# Patient Record
Sex: FEMALE | Race: WHITE | NOT HISPANIC OR LATINO | Employment: OTHER | URBAN - METROPOLITAN AREA
[De-identification: names, ages, dates, MRNs, and addresses within clinical notes are randomized per-mention and may not be internally consistent; named-entity substitution may affect disease eponyms.]

---

## 2017-02-01 RX ORDER — NORETHINDRONE ACETATE AND ETHINYL ESTRADIOL, ETHINYL ESTRADIOL AND FERROUS FUMARATE 1MG-10(24)
KIT ORAL
Qty: 28 TABLET | Refills: 1 | Status: SHIPPED | OUTPATIENT
Start: 2017-02-01 | End: 2018-05-25

## 2018-03-27 ENCOUNTER — TRANSCRIBE ORDERS (OUTPATIENT)
Dept: ADMINISTRATIVE | Facility: HOSPITAL | Age: 53
End: 2018-03-27

## 2018-03-27 DIAGNOSIS — R94.31 ABNORMAL EKG: Primary | ICD-10-CM

## 2018-04-02 ENCOUNTER — HOSPITAL ENCOUNTER (OUTPATIENT)
Dept: CARDIOLOGY | Facility: HOSPITAL | Age: 53
End: 2018-04-02

## 2018-05-01 ENCOUNTER — APPOINTMENT (OUTPATIENT)
Dept: WOMENS IMAGING | Facility: HOSPITAL | Age: 53
End: 2018-05-01

## 2018-05-01 PROCEDURE — 77063 BREAST TOMOSYNTHESIS BI: CPT | Performed by: RADIOLOGY

## 2018-05-01 PROCEDURE — 77067 SCR MAMMO BI INCL CAD: CPT | Performed by: RADIOLOGY

## 2018-05-01 PROCEDURE — 77080 DXA BONE DENSITY AXIAL: CPT | Performed by: RADIOLOGY

## 2018-05-21 ENCOUNTER — APPOINTMENT (OUTPATIENT)
Dept: WOMENS IMAGING | Facility: HOSPITAL | Age: 53
End: 2018-05-21

## 2018-05-21 PROCEDURE — 76641 ULTRASOUND BREAST COMPLETE: CPT | Performed by: RADIOLOGY

## 2018-05-21 PROCEDURE — MDREVIEWSP: Performed by: RADIOLOGY

## 2018-05-25 ENCOUNTER — OFFICE VISIT (OUTPATIENT)
Dept: CARDIOLOGY | Facility: CLINIC | Age: 53
End: 2018-05-25

## 2018-05-25 VITALS
WEIGHT: 210.6 LBS | HEART RATE: 73 BPM | SYSTOLIC BLOOD PRESSURE: 122 MMHG | HEIGHT: 65 IN | DIASTOLIC BLOOD PRESSURE: 84 MMHG | BODY MASS INDEX: 35.09 KG/M2

## 2018-05-25 DIAGNOSIS — R94.31 ABNORMAL EKG: Primary | ICD-10-CM

## 2018-05-25 DIAGNOSIS — Z01.818 PRE-OPERATIVE CLEARANCE: ICD-10-CM

## 2018-05-25 DIAGNOSIS — R06.02 SHORTNESS OF BREATH: ICD-10-CM

## 2018-05-25 PROCEDURE — 99204 OFFICE O/P NEW MOD 45 MIN: CPT | Performed by: INTERNAL MEDICINE

## 2018-05-25 PROCEDURE — 93000 ELECTROCARDIOGRAM COMPLETE: CPT | Performed by: INTERNAL MEDICINE

## 2018-05-25 RX ORDER — ZOLPIDEM TARTRATE 10 MG/1
10 TABLET ORAL NIGHTLY PRN
COMMUNITY
End: 2021-11-01

## 2018-05-25 RX ORDER — FEXOFENADINE HCL 180 MG/1
180 TABLET ORAL DAILY
COMMUNITY

## 2018-05-25 RX ORDER — FLUTICASONE PROPIONATE 50 MCG
2 SPRAY, SUSPENSION (ML) NASAL DAILY
COMMUNITY
End: 2018-07-18

## 2018-05-25 RX ORDER — OLMESARTAN MEDOXOMIL AND HYDROCHLOROTHIAZIDE 40/12.5 40; 12.5 MG/1; MG/1
1 TABLET ORAL DAILY
COMMUNITY
End: 2018-07-24 | Stop reason: HOSPADM

## 2018-05-25 RX ORDER — GABAPENTIN 600 MG/1
600 TABLET ORAL 3 TIMES DAILY
COMMUNITY
End: 2021-11-01

## 2018-05-27 NOTE — PROGRESS NOTES
Date of Office Visit: 2018  Encounter Provider: Jesus James MD  Place of Service: [facility] CARDIOLOGY  Patient Name: Angeles Oquendo  :1965    Chief complaint: Pre-operative clearance hysterectomy, abnormal EKG.    History of Present Illness:    I had the pleasure of seeing the patient in cardiology office on 2018.  She is a   very pleasant 53 year-old white female with a history of rheumatoid arthritis,   diabetes, ongoing tobacco use, and multiple other medical issues who presents   for preoperative clearance.  The patient is being schedule for a hysterectomy with   Dr. Bacon.  Her preoperative EKG was abnormal from 3/26/2018, and showed a   nonspecific interventricular conduction delay and a questionable old inferior infarct.    On direct questioning, the patient has not had any chest discomfort.  She does get   short of breath with exertion, although she states that she is not very active   because of her rheumatoid arthritis which limits her mobility.  She has smoked for   20-25 years, and is currently smoking approximately one pack per day.  She does   not have prior known coronary artery disease.    Past Medical History:   Diagnosis Date   • Asthma    • Bipolar I disorder, single manic episode    • Bursitis    • Chronic pain syndrome    • Diabetes mellitus    • Disc degeneration, lumbar    • Elbow joint pain     unrelenting   • Elbow pain     left   • Esophageal reflux    • Fibromyalgia    • Hypertension    • Obstructive sleep apnea     Not on CPAP   • PCOS (polycystic ovarian syndrome)    • Rheumatoid arthritis    • Vitamin D deficiency        Past Surgical History:   Procedure Laterality Date   •  SECTION     • TONSILLECTOMY     • UTERINE FIBROID SURGERY      Ablasion       Current Outpatient Prescriptions on File Prior to Visit   Medication Sig Dispense Refill   • Ascorbic Acid (VITAMIN C PO) Take 1 capsule by mouth daily.     • budesonide  (PULMICORT) 0.5 MG/2ML nebulizer solution 2 (two) times a day. Use 1 unit dose via nebulizer two times a day     • buPROPion XL (WELLBUTRIN XL) 150 MG 24 hr tablet Take 3 tablets by mouth daily.     • clonazePAM (KlonoPIN) 1 MG tablet Take by mouth.     • folic acid (FOLVITE) 1 MG tablet Take 1 tablet by mouth daily.     • glucose blood test strip Marcial Contour Next Test In Vitro Strip; Patient Sig: Marcial Contour Next Test In Vitro Strip ; 200; 0; 30-Apr-2013; Active     • ibuprofen (ADVIL,MOTRIN) 800 MG tablet Take 1 tablet by mouth 3 (three) times a day.     • insulin lispro (HumaLOG) 100 UNIT/ML injection Inject under the skin.     • levothyroxine (SYNTHROID, LEVOTHROID) 175 MCG tablet Take 1 tablet by mouth daily.     • methocarbamol (ROBAXIN) 750 MG tablet Take 1 tablet by mouth 3 (three) times a day.     • mometasone-formoterol (DULERA 100) 100-5 MCG/ACT inhaler Inhale 2 puffs twice daily     • montelukast (SINGULAIR) 10 MG tablet Take 1 tablet by mouth nightly.     • Omega-3 Fatty Acids (FISH OIL) 1200 MG capsule capsule Take by mouth. Take as directed.     • QUEtiapine (SEROquel) 300 MG tablet Take 1 tablet by mouth daily.     • Tofacitinib Citrate 5 MG tablet Take 1 tablet by mouth 2 (two) times a day.     • topiramate (TOPAMAX) 100 MG tablet Take 3 tablets by mouth daily.     • vitamin E 400 UNIT capsule Take by mouth. Take as directed.     • aspirin 81 MG tablet Take 1 tablet by mouth daily.       No current facility-administered medications on file prior to visit.      Allergies as of 05/25/2018 - Reviewed 05/25/2018   Allergen Reaction Noted   • Statins Myalgia 05/25/2018   • Sulfa antibiotics  12/14/2015     Social History     Social History   • Marital status:      Spouse name: N/A   • Number of children: N/A   • Years of education: N/A     Occupational History   • Not on file.     Social History Main Topics   • Smoking status: Current Every Day Smoker   • Smokeless tobacco: Never Used       "Comment: Smokes 1 pack every day   • Alcohol use No   • Drug use: No   • Sexual activity: Not on file     Other Topics Concern   • Not on file     Social History Narrative   • No narrative on file     Family History   Problem Relation Age of Onset   • Anemia Other    • Arthritis Other    • Asthma Other    • Depression Other         with anxiety   • Diabetes Other    • Hypertension Other    • Allergies Other    • Heart disease Other    • Diabetes Father    • Stroke Paternal Grandmother    • Heart attack Paternal Grandfather    • Diabetes Paternal Grandfather        Review of Systems   Constitution: Positive for fever and malaise/fatigue.   HENT: Positive for ear pain and sore throat.    Respiratory: Positive for cough and wheezing.    Endocrine: Positive for cold intolerance and heat intolerance.   Musculoskeletal: Positive for joint pain, joint swelling and myalgias.   Gastrointestinal: Positive for diarrhea.   Neurological: Positive for headaches.   Psychiatric/Behavioral: Positive for depression. The patient is nervous/anxious.    All other systems reviewed and are negative.     Objective:     Vitals:    05/25/18 0822   BP: 122/84   Pulse: 73   Weight: 95.5 kg (210 lb 9.6 oz)   Height: 165.1 cm (65\")     Body mass index is 35.05 kg/m².    Physical Exam   Constitutional: She is oriented to person, place, and time. She appears well-developed and well-nourished.   HENT:   Head: Normocephalic and atraumatic.   Eyes: Conjunctivae are normal.   Neck: Neck supple.   Cardiovascular: Normal rate and regular rhythm.  Exam reveals no gallop and no friction rub.    No murmur heard.  Pulmonary/Chest: Effort normal and breath sounds normal.   Abdominal: Soft. There is no tenderness.   Musculoskeletal: She exhibits no edema.   Neurological: She is alert and oriented to person, place, and time.   Skin: Skin is warm.   Psychiatric: She has a normal mood and affect. Her behavior is normal.     Lab Review:     ECG 12 " Lead  Date/Time: 5/25/2018 8:31 AM  Performed by: NATHANIEL BRISENO  Authorized by: NATHANIEL BRISENO   Comparison: compared with previous ECG from 3/26/2018  Similar to previous ECG  Rhythm: sinus rhythm  Rate: normal  BPM: 73  Conduction: non-specific intraventricular conduction delay  Clinical impression: abnormal ECG  Comments: Inferior infarct, age undetermined.            Assessment:       Diagnosis Plan   1. Abnormal EKG  Adult Transthoracic Echo Complete W/ Cont if Necessary Per Protocol    Stress Test With Myocardial Perfusion One Day   2. Shortness of breath  Adult Transthoracic Echo Complete W/ Cont if Necessary Per Protocol    Stress Test With Myocardial Perfusion One Day   3. Pre-operative clearance  Adult Transthoracic Echo Complete W/ Cont if Necessary Per Protocol    Stress Test With Myocardial Perfusion One Day     Plan:       Again, the patient's preoperative EKG from 3/26/2018 was similar to the one from   today in the office.  Both show a nonspecific interventricular conduction delay and a   possible old inferior infarct.  She does have significant risk factors for coronary artery   disease, including diabetes for at least 15 years, long-term tobacco use, and   rheumatoid arthritis.  She has not had chest pain, although she has limited functional   capacity with the rheumatoid arthritis.  I have recommended a full cardiac workup   prior to anesthesia and surgery.  I have ordered a transthoracic echocardiogram to   assess for any potential structural heart disease.  This will also assess regional wall   motion abnormalities to see if she is truly had an inferior infarct in the past.  I have   also recommended a Lexiscan Myoview stress test to evaluate for any potential   ischemia given her high-risk factors for coronary disease.  She cannot exercise   secondary to the rheumatoid arthritis, and Lexiscan is necessary in this case for   this reason.  I also advised her to continue on aspirin at  81 mg per day given her   diabetes and rheumatoid arthritis.  She is intolerant to statin medications.

## 2018-06-07 ENCOUNTER — HOSPITAL ENCOUNTER (OUTPATIENT)
Dept: CARDIOLOGY | Facility: HOSPITAL | Age: 53
End: 2018-06-07
Attending: INTERNAL MEDICINE

## 2018-06-07 ENCOUNTER — APPOINTMENT (OUTPATIENT)
Dept: CARDIOLOGY | Facility: HOSPITAL | Age: 53
End: 2018-06-07
Attending: INTERNAL MEDICINE

## 2018-06-18 ENCOUNTER — APPOINTMENT (OUTPATIENT)
Dept: CARDIOLOGY | Facility: HOSPITAL | Age: 53
End: 2018-06-18
Attending: INTERNAL MEDICINE

## 2018-06-27 ENCOUNTER — HOSPITAL ENCOUNTER (OUTPATIENT)
Dept: CARDIOLOGY | Facility: HOSPITAL | Age: 53
Discharge: HOME | End: 2018-06-27
Attending: INTERNAL MEDICINE | Admitting: INTERNAL MEDICINE

## 2018-06-27 ENCOUNTER — HOSPITAL ENCOUNTER (OUTPATIENT)
Dept: CARDIOLOGY | Facility: HOSPITAL | Age: 53
Discharge: HOME | End: 2018-06-27
Attending: INTERNAL MEDICINE

## 2018-06-27 VITALS
HEART RATE: 90 BPM | BODY MASS INDEX: 34.99 KG/M2 | SYSTOLIC BLOOD PRESSURE: 130 MMHG | HEIGHT: 65 IN | OXYGEN SATURATION: 98 % | WEIGHT: 210 LBS | DIASTOLIC BLOOD PRESSURE: 82 MMHG

## 2018-06-27 DIAGNOSIS — R06.02 SHORTNESS OF BREATH: ICD-10-CM

## 2018-06-27 DIAGNOSIS — R94.31 ABNORMAL EKG: ICD-10-CM

## 2018-06-27 DIAGNOSIS — Z01.818 PRE-OPERATIVE CLEARANCE: ICD-10-CM

## 2018-06-27 LAB
BH CV NUCLEAR PRIOR STUDY: 2
BH CV STRESS BP STAGE 1: NORMAL
BH CV STRESS COMMENTS STAGE 1: NORMAL
BH CV STRESS DOSE REGADENOSON STAGE 1: 0.4
BH CV STRESS DURATION MIN STAGE 1: 0
BH CV STRESS DURATION SEC STAGE 1: 10
BH CV STRESS HR STAGE 1: 111
BH CV STRESS PROTOCOL 1: NORMAL
BH CV STRESS RECOVERY BP: NORMAL MMHG
BH CV STRESS RECOVERY HR: 99 BPM
BH CV STRESS STAGE 1: 1
LV EF NUC BP: 48 %
MAXIMAL PREDICTED HEART RATE: 169 BPM
PERCENT MAX PREDICTED HR: 65.68 %
STRESS BASELINE BP: NORMAL MMHG
STRESS BASELINE HR: 81 BPM
STRESS PERCENT HR: 77 %
STRESS POST EXERCISE DUR SEC: 10 SEC
STRESS POST PEAK BP: NORMAL MMHG
STRESS POST PEAK HR: 111 BPM
STRESS TARGET HR: 144 BPM

## 2018-06-27 PROCEDURE — 93306 TTE W/DOPPLER COMPLETE: CPT

## 2018-06-27 PROCEDURE — A9502 TC99M TETROFOSMIN: HCPCS | Performed by: INTERNAL MEDICINE

## 2018-06-27 PROCEDURE — 0 TECHNETIUM TETROFOSMIN KIT: Performed by: INTERNAL MEDICINE

## 2018-06-27 PROCEDURE — 93016 CV STRESS TEST SUPVJ ONLY: CPT | Performed by: INTERNAL MEDICINE

## 2018-06-27 PROCEDURE — 78452 HT MUSCLE IMAGE SPECT MULT: CPT

## 2018-06-27 PROCEDURE — 93018 CV STRESS TEST I&R ONLY: CPT | Performed by: INTERNAL MEDICINE

## 2018-06-27 PROCEDURE — 25010000002 PERFLUTREN (DEFINITY) 8.476 MG IN SODIUM CHLORIDE 0.9 % 10 ML INJECTION: Performed by: INTERNAL MEDICINE

## 2018-06-27 PROCEDURE — 25010000002 REGADENOSON 0.4 MG/5ML SOLUTION: Performed by: INTERNAL MEDICINE

## 2018-06-27 PROCEDURE — 93306 TTE W/DOPPLER COMPLETE: CPT | Performed by: INTERNAL MEDICINE

## 2018-06-27 PROCEDURE — 78452 HT MUSCLE IMAGE SPECT MULT: CPT | Performed by: INTERNAL MEDICINE

## 2018-06-27 PROCEDURE — 93017 CV STRESS TEST TRACING ONLY: CPT

## 2018-06-27 RX ADMIN — TETROFOSMIN 1 DOSE: 1.38 INJECTION, POWDER, LYOPHILIZED, FOR SOLUTION INTRAVENOUS at 09:10

## 2018-06-27 RX ADMIN — PERFLUTREN 1.5 ML: 6.52 INJECTION, SUSPENSION INTRAVENOUS at 09:30

## 2018-06-27 RX ADMIN — REGADENOSON 0.4 MG: 0.08 INJECTION, SOLUTION INTRAVENOUS at 09:55

## 2018-06-27 RX ADMIN — TETROFOSMIN 1 DOSE: 1.38 INJECTION, POWDER, LYOPHILIZED, FOR SOLUTION INTRAVENOUS at 09:55

## 2018-06-28 ENCOUNTER — HOSPITAL ENCOUNTER (OUTPATIENT)
Facility: HOSPITAL | Age: 53
Setting detail: OBSERVATION
Discharge: HOME | End: 2018-06-30
Attending: INTERNAL MEDICINE | Admitting: INTERNAL MEDICINE

## 2018-06-28 ENCOUNTER — APPOINTMENT (OUTPATIENT)
Dept: CARDIOLOGY | Facility: HOSPITAL | Age: 53
End: 2018-06-28
Attending: INTERNAL MEDICINE

## 2018-06-28 PROBLEM — I34.0 SEVERE MITRAL REGURGITATION: Status: ACTIVE | Noted: 2018-06-28

## 2018-06-28 LAB
ALBUMIN SERPL-MCNC: 3.9 G/DL (ref 3.5–5.2)
ALBUMIN/GLOB SERPL: 1 G/DL
ALP SERPL-CCNC: 88 U/L (ref 39–117)
ALT SERPL W P-5'-P-CCNC: 13 U/L (ref 1–33)
ANION GAP SERPL CALCULATED.3IONS-SCNC: 11.1 MMOL/L
AST SERPL-CCNC: 15 U/L (ref 1–32)
BASOPHILS # BLD AUTO: 0.05 10*3/MM3 (ref 0–0.2)
BASOPHILS NFR BLD AUTO: 0.5 % (ref 0–1.5)
BH CV ECHO MEAS - ACS: 2 CM
BH CV ECHO MEAS - AI DEC SLOPE: 203.8 CM/SEC^2
BH CV ECHO MEAS - AI MAX PG: 50.2 MMHG
BH CV ECHO MEAS - AI MAX VEL: 354.4 CM/SEC
BH CV ECHO MEAS - AI P1/2T: 509.4 MSEC
BH CV ECHO MEAS - AO MAX PG (FULL): 1.1 MMHG
BH CV ECHO MEAS - AO MAX PG: 7.7 MMHG
BH CV ECHO MEAS - AO MEAN PG (FULL): 2 MMHG
BH CV ECHO MEAS - AO MEAN PG: 5.2 MMHG
BH CV ECHO MEAS - AO ROOT AREA: 8.7 CM^2
BH CV ECHO MEAS - AO ROOT DIAM: 3.3 CM
BH CV ECHO MEAS - AO V2 MAX: 139 CM/SEC
BH CV ECHO MEAS - AO V2 MEAN: 108.2 CM/SEC
BH CV ECHO MEAS - AO V2 VTI: 26.7 CM
BH CV ECHO MEAS - AVA(I,A): 2.2 CM^2
BH CV ECHO MEAS - AVA(I,D): 2.2 CM^2
BH CV ECHO MEAS - AVA(V,A): 2.5 CM^2
BH CV ECHO MEAS - AVA(V,D): 2.5 CM^2
BH CV ECHO MEAS - BSA(HAYCOCK): 2.1 M^2
BH CV ECHO MEAS - BSA: 2 M^2
BH CV ECHO MEAS - BZI_BMI: 34.6 KILOGRAMS/M^2
BH CV ECHO MEAS - BZI_METRIC_HEIGHT: 165.1 CM
BH CV ECHO MEAS - BZI_METRIC_WEIGHT: 94.3 KG
BH CV ECHO MEAS - CONTRAST EF (2CH): 49.2 ML/M^2
BH CV ECHO MEAS - CONTRAST EF 4CH: 58.7 ML/M^2
BH CV ECHO MEAS - EDV(MOD-SP2): 181 ML
BH CV ECHO MEAS - EDV(MOD-SP4): 138 ML
BH CV ECHO MEAS - EDV(TEICH): 159.8 ML
BH CV ECHO MEAS - EF(CUBED): 74.3 %
BH CV ECHO MEAS - EF(MOD-BP): 58 %
BH CV ECHO MEAS - EF(MOD-SP2): 49.2 %
BH CV ECHO MEAS - EF(MOD-SP4): 58.7 %
BH CV ECHO MEAS - EF(TEICH): 65.5 %
BH CV ECHO MEAS - ESV(MOD-SP2): 92 ML
BH CV ECHO MEAS - ESV(MOD-SP4): 57 ML
BH CV ECHO MEAS - ESV(TEICH): 55.2 ML
BH CV ECHO MEAS - IVS/LVPW: 1.1
BH CV ECHO MEAS - IVSD: 1.5 CM
BH CV ECHO MEAS - LAT PEAK E' VEL: 7 CM/SEC
BH CV ECHO MEAS - LV MASS(C)D: 365.7 GRAMS
BH CV ECHO MEAS - LV MAX PG: 6.6 MMHG
BH CV ECHO MEAS - LV MEAN PG: 3.2 MMHG
BH CV ECHO MEAS - LV V1 MAX: 128.5 CM/SEC
BH CV ECHO MEAS - LV V1 MEAN: 80 CM/SEC
BH CV ECHO MEAS - LV V1 VTI: 22.3 CM
BH CV ECHO MEAS - LVIDD: 5.7 CM
BH CV ECHO MEAS - LVIDS: 3.6 CM
BH CV ECHO MEAS - LVLD AP2: 7.5 CM
BH CV ECHO MEAS - LVLD AP4: 7.5 CM
BH CV ECHO MEAS - LVLS AP2: 6.9 CM
BH CV ECHO MEAS - LVLS AP4: 6.9 CM
BH CV ECHO MEAS - LVOT AREA (M): 2.5 CM^2
BH CV ECHO MEAS - LVOT AREA: 2.7 CM^2
BH CV ECHO MEAS - LVOT DIAM: 1.8 CM
BH CV ECHO MEAS - LVPWD: 1.4 CM
BH CV ECHO MEAS - MED PEAK E' VEL: 7 CM/SEC
BH CV ECHO MEAS - MR MAX PG: 129.2 MMHG
BH CV ECHO MEAS - MR MAX VEL: 568.3 CM/SEC
BH CV ECHO MEAS - MR MEAN PG: 90.3 MMHG
BH CV ECHO MEAS - MR MEAN VEL: 456.1 CM/SEC
BH CV ECHO MEAS - MR VTI: 174.2 CM
BH CV ECHO MEAS - MV A DUR: 0.16 SEC
BH CV ECHO MEAS - MV A MAX VEL: 158.4 CM/SEC
BH CV ECHO MEAS - MV DEC SLOPE: 1025 CM/SEC^2
BH CV ECHO MEAS - MV DEC SLOPE: 817 CM/SEC^2
BH CV ECHO MEAS - MV DEC TIME: 0.2 SEC
BH CV ECHO MEAS - MV E MAX VEL: 209 CM/SEC
BH CV ECHO MEAS - MV E/A: 1.3
BH CV ECHO MEAS - MV MAX PG: 15.9 MMHG
BH CV ECHO MEAS - MV MAX PG: 18.2 MMHG
BH CV ECHO MEAS - MV MEAN PG: 10.1 MMHG
BH CV ECHO MEAS - MV MEAN PG: 6 MMHG
BH CV ECHO MEAS - MV P1/2T MAX VEL: 202 CM/SEC
BH CV ECHO MEAS - MV P1/2T MAX VEL: 211.2 CM/SEC
BH CV ECHO MEAS - MV P1/2T: 60.4 MSEC
BH CV ECHO MEAS - MV P1/2T: 72.4 MSEC
BH CV ECHO MEAS - MV V2 MAX: 199 CM/SEC
BH CV ECHO MEAS - MV V2 MAX: 213.6 CM/SEC
BH CV ECHO MEAS - MV V2 MEAN: 119 CM/SEC
BH CV ECHO MEAS - MV V2 MEAN: 152.5 CM/SEC
BH CV ECHO MEAS - MV V2 VTI: 44.6 CM
BH CV ECHO MEAS - MV V2 VTI: 46.2 CM
BH CV ECHO MEAS - MVA P1/2T LCG: 1 CM^2
BH CV ECHO MEAS - MVA P1/2T LCG: 1.1 CM^2
BH CV ECHO MEAS - MVA(P1/2T): 3 CM^2
BH CV ECHO MEAS - MVA(P1/2T): 3.6 CM^2
BH CV ECHO MEAS - MVA(TRACED): 3 CM^2
BH CV ECHO MEAS - MVA(VTI): 1.3 CM^2
BH CV ECHO MEAS - PA MAX PG (FULL): 2.6 MMHG
BH CV ECHO MEAS - PA MAX PG: 5.2 MMHG
BH CV ECHO MEAS - PA V2 MAX: 114 CM/SEC
BH CV ECHO MEAS - PULM DIAS VEL: 79 CM/SEC
BH CV ECHO MEAS - PULM S/D: 0.69
BH CV ECHO MEAS - PULM SYS VEL: 54.7 CM/SEC
BH CV ECHO MEAS - PVA(V,A): 2.3 CM^2
BH CV ECHO MEAS - PVA(V,D): 2.3 CM^2
BH CV ECHO MEAS - QP/QS: 0.91
BH CV ECHO MEAS - RAP SYSTOLE: 3 MMHG
BH CV ECHO MEAS - RAP SYSTOLE: 3 MMHG
BH CV ECHO MEAS - RV MAX PG: 2.6 MMHG
BH CV ECHO MEAS - RV MEAN PG: 1.3 MMHG
BH CV ECHO MEAS - RV V1 MAX: 80.5 CM/SEC
BH CV ECHO MEAS - RV V1 MEAN: 51.4 CM/SEC
BH CV ECHO MEAS - RV V1 VTI: 16.5 CM
BH CV ECHO MEAS - RVOT AREA: 3.3 CM^2
BH CV ECHO MEAS - RVOT DIAM: 2 CM
BH CV ECHO MEAS - RVSP: 29 MMHG
BH CV ECHO MEAS - SUP REN AO DIAM: 2.4 CM
BH CV ECHO MEAS - SV(AO): 233.1 ML
BH CV ECHO MEAS - SV(CUBED): 137.3 ML
BH CV ECHO MEAS - SV(LVOT): 59.2 ML
BH CV ECHO MEAS - SV(MOD-SP2): 89 ML
BH CV ECHO MEAS - SV(MOD-SP4): 81 ML
BH CV ECHO MEAS - SV(RVOT): 53.9 ML
BH CV ECHO MEAS - SV(TEICH): 104.6 ML
BH CV ECHO MEAS - TAPSE (>1.6): 2.5 CM2
BH CV ECHO MEASUREMENTS AVERAGE E/E' RATIO: 29.86
BH CV VAS BP LEFT ARM: NORMAL MMHG
BH CV XLRA - RV BASE: 3.5 CM
BH CV XLRA - TDI S': 12 CM/SEC
BILIRUB SERPL-MCNC: 0.3 MG/DL (ref 0.1–1.2)
BUN BLD-MCNC: 14 MG/DL (ref 6–20)
BUN/CREAT SERPL: 12.8 (ref 7–25)
CALCIUM SPEC-SCNC: 9.4 MG/DL (ref 8.6–10.5)
CHLORIDE SERPL-SCNC: 107 MMOL/L (ref 98–107)
CO2 SERPL-SCNC: 24.9 MMOL/L (ref 22–29)
CREAT BLD-MCNC: 1.09 MG/DL (ref 0.57–1)
DEPRECATED RDW RBC AUTO: 47.5 FL (ref 37–54)
EOSINOPHIL # BLD AUTO: 0.17 10*3/MM3 (ref 0–0.7)
EOSINOPHIL NFR BLD AUTO: 1.7 % (ref 0.3–6.2)
ERYTHROCYTE [DISTWIDTH] IN BLOOD BY AUTOMATED COUNT: 14.1 % (ref 11.7–13)
GFR SERPL CREATININE-BSD FRML MDRD: 53 ML/MIN/1.73
GLOBULIN UR ELPH-MCNC: 3.8 GM/DL
GLUCOSE BLD-MCNC: 80 MG/DL (ref 65–99)
HCT VFR BLD AUTO: 44 % (ref 35.6–45.5)
HGB BLD-MCNC: 14.6 G/DL (ref 11.9–15.5)
IMM GRANULOCYTES # BLD: 0.03 10*3/MM3 (ref 0–0.03)
IMM GRANULOCYTES NFR BLD: 0.3 % (ref 0–0.5)
INR PPP: 1.13 (ref 0.9–1.1)
LEFT ATRIUM VOLUME INDEX: 65 ML/M2
LV EF 2D ECHO EST: 45 %
LYMPHOCYTES # BLD AUTO: 4.29 10*3/MM3 (ref 0.9–4.8)
LYMPHOCYTES NFR BLD AUTO: 44 % (ref 19.6–45.3)
MAXIMAL PREDICTED HEART RATE: 169 BPM
MCH RBC QN AUTO: 30.3 PG (ref 26.9–32)
MCHC RBC AUTO-ENTMCNC: 33.2 G/DL (ref 32.4–36.3)
MCV RBC AUTO: 91.3 FL (ref 80.5–98.2)
MONOCYTES # BLD AUTO: 0.7 10*3/MM3 (ref 0.2–1.2)
MONOCYTES NFR BLD AUTO: 7.2 % (ref 5–12)
MV VALVE AREA BY PLANIMETRY: 2.9 CM2
NEUTROPHILS # BLD AUTO: 4.54 10*3/MM3 (ref 1.9–8.1)
NEUTROPHILS NFR BLD AUTO: 46.6 % (ref 42.7–76)
PLATELET # BLD AUTO: 198 10*3/MM3 (ref 140–500)
PMV BLD AUTO: 11.8 FL (ref 6–12)
POTASSIUM BLD-SCNC: 3.7 MMOL/L (ref 3.5–5.2)
PROT SERPL-MCNC: 7.7 G/DL (ref 6–8.5)
PROTHROMBIN TIME: 14.3 SECONDS (ref 11.7–14.2)
RBC # BLD AUTO: 4.82 10*6/MM3 (ref 3.9–5.2)
SINUS: 2.7 CM
SODIUM BLD-SCNC: 143 MMOL/L (ref 136–145)
STJ: 2.7 CM
STRESS TARGET HR: 144 BPM
WBC NRBC COR # BLD: 9.75 10*3/MM3 (ref 4.5–10.7)

## 2018-06-28 PROCEDURE — 99223 1ST HOSP IP/OBS HIGH 75: CPT | Performed by: INTERNAL MEDICINE

## 2018-06-28 PROCEDURE — 25010000002 FENTANYL CITRATE (PF) 100 MCG/2ML SOLUTION: Performed by: INTERNAL MEDICINE

## 2018-06-28 PROCEDURE — 93320 DOPPLER ECHO COMPLETE: CPT

## 2018-06-28 PROCEDURE — 85610 PROTHROMBIN TIME: CPT | Performed by: INTERNAL MEDICINE

## 2018-06-28 PROCEDURE — G0378 HOSPITAL OBSERVATION PER HR: HCPCS

## 2018-06-28 PROCEDURE — 99153 MOD SED SAME PHYS/QHP EA: CPT

## 2018-06-28 PROCEDURE — 93325 DOPPLER ECHO COLOR FLOW MAPG: CPT

## 2018-06-28 PROCEDURE — 25010000002 MIDAZOLAM PER 1 MG: Performed by: INTERNAL MEDICINE

## 2018-06-28 PROCEDURE — 93320 DOPPLER ECHO COMPLETE: CPT | Performed by: INTERNAL MEDICINE

## 2018-06-28 PROCEDURE — 93325 DOPPLER ECHO COLOR FLOW MAPG: CPT | Performed by: INTERNAL MEDICINE

## 2018-06-28 PROCEDURE — 99152 MOD SED SAME PHYS/QHP 5/>YRS: CPT

## 2018-06-28 PROCEDURE — 80053 COMPREHEN METABOLIC PANEL: CPT | Performed by: INTERNAL MEDICINE

## 2018-06-28 PROCEDURE — 93312 ECHO TRANSESOPHAGEAL: CPT | Performed by: INTERNAL MEDICINE

## 2018-06-28 PROCEDURE — 85025 COMPLETE CBC W/AUTO DIFF WBC: CPT | Performed by: INTERNAL MEDICINE

## 2018-06-28 PROCEDURE — 93312 ECHO TRANSESOPHAGEAL: CPT

## 2018-06-28 RX ORDER — CLONAZEPAM 1 MG/1
1 TABLET ORAL 4 TIMES DAILY PRN
Status: DISCONTINUED | OUTPATIENT
Start: 2018-06-28 | End: 2018-06-30 | Stop reason: HOSPADM

## 2018-06-28 RX ORDER — ASCORBIC ACID 500 MG
500 TABLET ORAL DAILY
Status: DISCONTINUED | OUTPATIENT
Start: 2018-06-28 | End: 2018-06-30 | Stop reason: HOSPADM

## 2018-06-28 RX ORDER — TOPIRAMATE 100 MG/1
300 TABLET, FILM COATED ORAL NIGHTLY
Status: DISCONTINUED | OUTPATIENT
Start: 2018-06-28 | End: 2018-06-30 | Stop reason: HOSPADM

## 2018-06-28 RX ORDER — SODIUM CHLORIDE 0.9 % (FLUSH) 0.9 %
1-10 SYRINGE (ML) INJECTION AS NEEDED
Status: DISCONTINUED | OUTPATIENT
Start: 2018-06-28 | End: 2018-06-30 | Stop reason: HOSPADM

## 2018-06-28 RX ORDER — CARVEDILOL 3.12 MG/1
3.12 TABLET ORAL EVERY 12 HOURS SCHEDULED
Status: DISCONTINUED | OUTPATIENT
Start: 2018-06-28 | End: 2018-06-30 | Stop reason: HOSPADM

## 2018-06-28 RX ORDER — MONTELUKAST SODIUM 10 MG/1
10 TABLET ORAL NIGHTLY
Status: DISCONTINUED | OUTPATIENT
Start: 2018-06-28 | End: 2018-06-30 | Stop reason: HOSPADM

## 2018-06-28 RX ORDER — SODIUM CHLORIDE 9 MG/ML
INJECTION, SOLUTION INTRAVENOUS
Status: COMPLETED | OUTPATIENT
Start: 2018-06-28 | End: 2018-06-28

## 2018-06-28 RX ORDER — ONDANSETRON 2 MG/ML
4 INJECTION INTRAMUSCULAR; INTRAVENOUS EVERY 6 HOURS PRN
Status: DISCONTINUED | OUTPATIENT
Start: 2018-06-28 | End: 2018-06-30 | Stop reason: HOSPADM

## 2018-06-28 RX ORDER — HYDROCODONE BITARTRATE AND ACETAMINOPHEN 10; 325 MG/1; MG/1
1 TABLET ORAL EVERY 6 HOURS PRN
Status: DISCONTINUED | OUTPATIENT
Start: 2018-06-28 | End: 2018-06-30 | Stop reason: HOSPADM

## 2018-06-28 RX ORDER — MIDAZOLAM HYDROCHLORIDE 1 MG/ML
INJECTION INTRAMUSCULAR; INTRAVENOUS
Status: COMPLETED | OUTPATIENT
Start: 2018-06-28 | End: 2018-06-28

## 2018-06-28 RX ORDER — HYDROCODONE BITARTRATE AND ACETAMINOPHEN 10; 325 MG/1; MG/1
1 TABLET ORAL EVERY 6 HOURS PRN
COMMUNITY
End: 2018-07-24 | Stop reason: HOSPADM

## 2018-06-28 RX ORDER — GABAPENTIN 300 MG/1
600 CAPSULE ORAL EVERY 8 HOURS SCHEDULED
Status: DISCONTINUED | OUTPATIENT
Start: 2018-06-28 | End: 2018-06-30 | Stop reason: HOSPADM

## 2018-06-28 RX ORDER — METHOCARBAMOL 750 MG/1
750 TABLET, FILM COATED ORAL EVERY 12 HOURS SCHEDULED
Status: DISCONTINUED | OUTPATIENT
Start: 2018-06-28 | End: 2018-06-30 | Stop reason: HOSPADM

## 2018-06-28 RX ORDER — BUDESONIDE AND FORMOTEROL FUMARATE DIHYDRATE 160; 4.5 UG/1; UG/1
2 AEROSOL RESPIRATORY (INHALATION)
Status: DISCONTINUED | OUTPATIENT
Start: 2018-06-28 | End: 2018-06-30 | Stop reason: HOSPADM

## 2018-06-28 RX ORDER — CETIRIZINE HYDROCHLORIDE 10 MG/1
10 TABLET ORAL DAILY
Status: DISCONTINUED | OUTPATIENT
Start: 2018-06-28 | End: 2018-06-30 | Stop reason: HOSPADM

## 2018-06-28 RX ORDER — PREDNISONE 1 MG/1
5 TABLET ORAL DAILY
Status: DISCONTINUED | OUTPATIENT
Start: 2018-06-28 | End: 2018-06-30 | Stop reason: HOSPADM

## 2018-06-28 RX ORDER — FENTANYL CITRATE 50 UG/ML
INJECTION, SOLUTION INTRAMUSCULAR; INTRAVENOUS
Status: COMPLETED | OUTPATIENT
Start: 2018-06-28 | End: 2018-06-28

## 2018-06-28 RX ORDER — LEVOTHYROXINE SODIUM 0.1 MG/1
200 TABLET ORAL DAILY
Status: DISCONTINUED | OUTPATIENT
Start: 2018-06-28 | End: 2018-06-30 | Stop reason: HOSPADM

## 2018-06-28 RX ORDER — ZOLPIDEM TARTRATE 5 MG/1
10 TABLET ORAL NIGHTLY PRN
Status: DISCONTINUED | OUTPATIENT
Start: 2018-06-28 | End: 2018-06-30 | Stop reason: HOSPADM

## 2018-06-28 RX ORDER — ASPIRIN 81 MG/1
81 TABLET ORAL DAILY
Status: DISCONTINUED | OUTPATIENT
Start: 2018-06-28 | End: 2018-06-30 | Stop reason: HOSPADM

## 2018-06-28 RX ORDER — LEVOTHYROXINE SODIUM 175 UG/1
175 TABLET ORAL DAILY
COMMUNITY

## 2018-06-28 RX ADMIN — LIDOCAINE HYDROCHLORIDE 10 ML: 20 SOLUTION ORAL; TOPICAL at 12:46

## 2018-06-28 RX ADMIN — FENTANYL CITRATE 25 MCG: 50 INJECTION INTRAMUSCULAR; INTRAVENOUS at 12:59

## 2018-06-28 RX ADMIN — FENTANYL CITRATE 50 MCG: 50 INJECTION INTRAMUSCULAR; INTRAVENOUS at 13:06

## 2018-06-28 RX ADMIN — MIDAZOLAM 2 MG: 1 INJECTION INTRAMUSCULAR; INTRAVENOUS at 13:08

## 2018-06-28 RX ADMIN — MIDAZOLAM 2 MG: 1 INJECTION INTRAMUSCULAR; INTRAVENOUS at 13:01

## 2018-06-28 RX ADMIN — MONTELUKAST 10 MG: 10 TABLET, FILM COATED ORAL at 22:22

## 2018-06-28 RX ADMIN — METHOCARBAMOL 750 MG: 750 TABLET ORAL at 22:22

## 2018-06-28 RX ADMIN — MIDAZOLAM 2 MG: 1 INJECTION INTRAMUSCULAR; INTRAVENOUS at 12:59

## 2018-06-28 RX ADMIN — FENTANYL CITRATE 25 MCG: 50 INJECTION INTRAMUSCULAR; INTRAVENOUS at 13:02

## 2018-06-28 RX ADMIN — MIDAZOLAM 2 MG: 1 INJECTION INTRAMUSCULAR; INTRAVENOUS at 12:55

## 2018-06-28 RX ADMIN — QUETIAPINE FUMARATE 300 MG: 200 TABLET, FILM COATED ORAL at 22:23

## 2018-06-28 RX ADMIN — FENTANYL CITRATE 25 MCG: 50 INJECTION INTRAMUSCULAR; INTRAVENOUS at 12:56

## 2018-06-28 RX ADMIN — FENTANYL CITRATE 25 MCG: 50 INJECTION INTRAMUSCULAR; INTRAVENOUS at 12:55

## 2018-06-28 RX ADMIN — CARVEDILOL 3.12 MG: 3.12 TABLET, FILM COATED ORAL at 22:23

## 2018-06-28 RX ADMIN — ASPIRIN 81 MG: 81 TABLET, DELAYED RELEASE ORAL at 18:28

## 2018-06-28 RX ADMIN — HYDROCODONE BITARTRATE AND ACETAMINOPHEN 1 TABLET: 10; 325 TABLET ORAL at 18:38

## 2018-06-28 RX ADMIN — GABAPENTIN 600 MG: 300 CAPSULE ORAL at 22:23

## 2018-06-28 RX ADMIN — TOPIRAMATE 300 MG: 100 TABLET, FILM COATED ORAL at 22:23

## 2018-06-28 RX ADMIN — MIDAZOLAM 2 MG: 1 INJECTION INTRAMUSCULAR; INTRAVENOUS at 13:04

## 2018-06-28 RX ADMIN — FENTANYL CITRATE 25 MCG: 50 INJECTION INTRAMUSCULAR; INTRAVENOUS at 13:11

## 2018-06-28 RX ADMIN — SODIUM CHLORIDE 50 ML/HR: 9 INJECTION, SOLUTION INTRAVENOUS at 12:45

## 2018-06-28 RX ADMIN — MIDAZOLAM 2 MG: 1 INJECTION INTRAMUSCULAR; INTRAVENOUS at 12:56

## 2018-06-28 RX ADMIN — CARVEDILOL 3.12 MG: 3.12 TABLET, FILM COATED ORAL at 18:29

## 2018-06-29 ENCOUNTER — PREP FOR SURGERY (OUTPATIENT)
Dept: OTHER | Facility: HOSPITAL | Age: 53
End: 2018-06-29

## 2018-06-29 DIAGNOSIS — I65.23 OCCLUSION AND STENOSIS OF BILATERAL CAROTID ARTERIES: ICD-10-CM

## 2018-06-29 DIAGNOSIS — I25.10 CORONARY ARTERY DISEASE INVOLVING NATIVE HEART WITHOUT ANGINA PECTORIS, UNSPECIFIED VESSEL OR LESION TYPE: Primary | ICD-10-CM

## 2018-06-29 DIAGNOSIS — I25.110 ATHEROSCLEROTIC HEART DISEASE OF NATIVE CORONARY ARTERY WITH UNSTABLE ANGINA PECTORIS (HCC): ICD-10-CM

## 2018-06-29 DIAGNOSIS — R79.1 ABNORMAL COAGULATION PROFILE: ICD-10-CM

## 2018-06-29 DIAGNOSIS — E11.59 TYPE 2 DIABETES MELLITUS WITH OTHER CIRCULATORY COMPLICATIONS (CODE): ICD-10-CM

## 2018-06-29 DIAGNOSIS — I34.0 MITRAL VALVE INSUFFICIENCY, UNSPECIFIED ETIOLOGY: ICD-10-CM

## 2018-06-29 LAB
ANION GAP SERPL CALCULATED.3IONS-SCNC: 9.3 MMOL/L
BUN BLD-MCNC: 19 MG/DL (ref 6–20)
BUN/CREAT SERPL: 19.2 (ref 7–25)
CALCIUM SPEC-SCNC: 9 MG/DL (ref 8.6–10.5)
CHLORIDE SERPL-SCNC: 105 MMOL/L (ref 98–107)
CO2 SERPL-SCNC: 25.7 MMOL/L (ref 22–29)
CREAT BLD-MCNC: 0.99 MG/DL (ref 0.57–1)
DEPRECATED RDW RBC AUTO: 46.8 FL (ref 37–54)
ERYTHROCYTE [DISTWIDTH] IN BLOOD BY AUTOMATED COUNT: 13.8 % (ref 11.7–13)
GFR SERPL CREATININE-BSD FRML MDRD: 59 ML/MIN/1.73
GLUCOSE BLD-MCNC: 72 MG/DL (ref 65–99)
GLUCOSE BLDC GLUCOMTR-MCNC: 108 MG/DL (ref 70–130)
GLUCOSE BLDC GLUCOMTR-MCNC: 187 MG/DL (ref 70–130)
GLUCOSE BLDC GLUCOMTR-MCNC: 55 MG/DL (ref 70–130)
HCT VFR BLD AUTO: 42.2 % (ref 35.6–45.5)
HCT VFR BLDA CALC: 43 % (ref 38–51)
HGB BLD-MCNC: 13.4 G/DL (ref 11.9–15.5)
HGB BLDA-MCNC: 14.6 G/DL (ref 12–17)
MCH RBC QN AUTO: 29.6 PG (ref 26.9–32)
MCHC RBC AUTO-ENTMCNC: 31.8 G/DL (ref 32.4–36.3)
MCV RBC AUTO: 93.2 FL (ref 80.5–98.2)
PLATELET # BLD AUTO: 171 10*3/MM3 (ref 140–500)
PMV BLD AUTO: 11.8 FL (ref 6–12)
POTASSIUM BLD-SCNC: 3.4 MMOL/L (ref 3.5–5.2)
RBC # BLD AUTO: 4.53 10*6/MM3 (ref 3.9–5.2)
SAO2 % BLDA: 94 % (ref 95–98)
SODIUM BLD-SCNC: 140 MMOL/L (ref 136–145)
T4 FREE SERPL-MCNC: 1.25 NG/DL (ref 0.93–1.7)
TSH SERPL DL<=0.05 MIU/L-ACNC: 0.35 MIU/ML (ref 0.27–4.2)
WBC NRBC COR # BLD: 7.06 10*3/MM3 (ref 4.5–10.7)

## 2018-06-29 PROCEDURE — 25010000002 FENTANYL CITRATE (PF) 100 MCG/2ML SOLUTION: Performed by: INTERNAL MEDICINE

## 2018-06-29 PROCEDURE — 85014 HEMATOCRIT: CPT

## 2018-06-29 PROCEDURE — 99222 1ST HOSP IP/OBS MODERATE 55: CPT | Performed by: THORACIC SURGERY (CARDIOTHORACIC VASCULAR SURGERY)

## 2018-06-29 PROCEDURE — 0 IOPAMIDOL PER 1 ML: Performed by: INTERNAL MEDICINE

## 2018-06-29 PROCEDURE — 80048 BASIC METABOLIC PNL TOTAL CA: CPT | Performed by: INTERNAL MEDICINE

## 2018-06-29 PROCEDURE — 4A023N8 MEASUREMENT OF CARDIAC SAMPLING AND PRESSURE, BILATERAL, PERCUTANEOUS APPROACH: ICD-10-PCS | Performed by: INTERNAL MEDICINE

## 2018-06-29 PROCEDURE — 99223 1ST HOSP IP/OBS HIGH 75: CPT | Performed by: NURSE PRACTITIONER

## 2018-06-29 PROCEDURE — C1894 INTRO/SHEATH, NON-LASER: HCPCS | Performed by: INTERNAL MEDICINE

## 2018-06-29 PROCEDURE — 93460 R&L HRT ART/VENTRICLE ANGIO: CPT | Performed by: INTERNAL MEDICINE

## 2018-06-29 PROCEDURE — G0378 HOSPITAL OBSERVATION PER HR: HCPCS

## 2018-06-29 PROCEDURE — 25010000002 HEPARIN (PORCINE) PER 1000 UNITS: Performed by: INTERNAL MEDICINE

## 2018-06-29 PROCEDURE — 84443 ASSAY THYROID STIM HORMONE: CPT | Performed by: INTERNAL MEDICINE

## 2018-06-29 PROCEDURE — 25010000002 MIDAZOLAM PER 1 MG: Performed by: INTERNAL MEDICINE

## 2018-06-29 PROCEDURE — 84439 ASSAY OF FREE THYROXINE: CPT | Performed by: INTERNAL MEDICINE

## 2018-06-29 PROCEDURE — C1769 GUIDE WIRE: HCPCS | Performed by: INTERNAL MEDICINE

## 2018-06-29 PROCEDURE — 85018 HEMOGLOBIN: CPT

## 2018-06-29 PROCEDURE — 82962 GLUCOSE BLOOD TEST: CPT

## 2018-06-29 PROCEDURE — B2150ZZ FLUOROSCOPY OF LEFT HEART USING HIGH OSMOLAR CONTRAST: ICD-10-PCS | Performed by: INTERNAL MEDICINE

## 2018-06-29 PROCEDURE — B2110ZZ FLUOROSCOPY OF MULTIPLE CORONARY ARTERIES USING HIGH OSMOLAR CONTRAST: ICD-10-PCS | Performed by: INTERNAL MEDICINE

## 2018-06-29 PROCEDURE — 85027 COMPLETE CBC AUTOMATED: CPT | Performed by: INTERNAL MEDICINE

## 2018-06-29 RX ORDER — SODIUM CHLORIDE 9 MG/ML
INJECTION, SOLUTION INTRAVENOUS CONTINUOUS PRN
Status: COMPLETED | OUTPATIENT
Start: 2018-06-29 | End: 2018-06-29

## 2018-06-29 RX ORDER — CHLORHEXIDINE GLUCONATE 0.12 MG/ML
15 RINSE ORAL ONCE
Status: CANCELLED | OUTPATIENT
Start: 2018-07-19 | End: 2018-07-19

## 2018-06-29 RX ORDER — CHLORHEXIDINE GLUCONATE 500 MG/1
1 CLOTH TOPICAL EVERY 12 HOURS PRN
Status: CANCELLED | OUTPATIENT
Start: 2018-07-19

## 2018-06-29 RX ORDER — FENTANYL CITRATE 50 UG/ML
INJECTION, SOLUTION INTRAMUSCULAR; INTRAVENOUS AS NEEDED
Status: DISCONTINUED | OUTPATIENT
Start: 2018-06-29 | End: 2018-06-29 | Stop reason: HOSPADM

## 2018-06-29 RX ORDER — CHLORHEXIDINE GLUCONATE 0.12 MG/ML
15 RINSE ORAL EVERY 12 HOURS
Status: CANCELLED | OUTPATIENT
Start: 2018-06-29 | End: 2018-06-30

## 2018-06-29 RX ORDER — DEXTROSE MONOHYDRATE 25 G/50ML
25 INJECTION, SOLUTION INTRAVENOUS
Status: DISCONTINUED | OUTPATIENT
Start: 2018-06-29 | End: 2018-06-30 | Stop reason: HOSPADM

## 2018-06-29 RX ORDER — MIDAZOLAM HYDROCHLORIDE 1 MG/ML
INJECTION INTRAMUSCULAR; INTRAVENOUS AS NEEDED
Status: DISCONTINUED | OUTPATIENT
Start: 2018-06-29 | End: 2018-06-29 | Stop reason: HOSPADM

## 2018-06-29 RX ORDER — DEXTROSE MONOHYDRATE 25 G/50ML
INJECTION, SOLUTION INTRAVENOUS
Status: COMPLETED
Start: 2018-06-29 | End: 2018-06-29

## 2018-06-29 RX ORDER — CEFAZOLIN SODIUM 2 G/100ML
2 INJECTION, SOLUTION INTRAVENOUS ONCE
Status: CANCELLED | OUTPATIENT
Start: 2018-07-19 | End: 2018-07-19

## 2018-06-29 RX ORDER — CHLORHEXIDINE GLUCONATE 500 MG/1
1 CLOTH TOPICAL EVERY 12 HOURS PRN
Status: CANCELLED | OUTPATIENT
Start: 2018-06-29

## 2018-06-29 RX ORDER — LIDOCAINE HYDROCHLORIDE 20 MG/ML
INJECTION, SOLUTION INFILTRATION; PERINEURAL AS NEEDED
Status: DISCONTINUED | OUTPATIENT
Start: 2018-06-29 | End: 2018-06-29 | Stop reason: HOSPADM

## 2018-06-29 RX ADMIN — TOPIRAMATE 300 MG: 100 TABLET, FILM COATED ORAL at 22:38

## 2018-06-29 RX ADMIN — QUETIAPINE FUMARATE 300 MG: 200 TABLET, FILM COATED ORAL at 22:38

## 2018-06-29 RX ADMIN — HYDROCODONE BITARTRATE AND ACETAMINOPHEN 1 TABLET: 10; 325 TABLET ORAL at 09:05

## 2018-06-29 RX ADMIN — ASPIRIN 81 MG: 81 TABLET, DELAYED RELEASE ORAL at 09:06

## 2018-06-29 RX ADMIN — CARVEDILOL 3.12 MG: 3.12 TABLET, FILM COATED ORAL at 09:06

## 2018-06-29 RX ADMIN — LOSARTAN POTASSIUM: 50 TABLET, FILM COATED ORAL at 18:22

## 2018-06-29 RX ADMIN — CARVEDILOL 3.12 MG: 3.12 TABLET, FILM COATED ORAL at 20:44

## 2018-06-29 RX ADMIN — GABAPENTIN 600 MG: 300 CAPSULE ORAL at 18:22

## 2018-06-29 RX ADMIN — MONTELUKAST 10 MG: 10 TABLET, FILM COATED ORAL at 22:38

## 2018-06-29 RX ADMIN — DEXTROSE MONOHYDRATE 25 ML: 25 INJECTION, SOLUTION INTRAVENOUS at 11:01

## 2018-06-29 RX ADMIN — HYDROCODONE BITARTRATE AND ACETAMINOPHEN 1 TABLET: 10; 325 TABLET ORAL at 18:22

## 2018-06-29 RX ADMIN — GABAPENTIN 600 MG: 300 CAPSULE ORAL at 07:01

## 2018-06-29 RX ADMIN — LEVOTHYROXINE SODIUM 200 MCG: 100 TABLET ORAL at 09:05

## 2018-06-30 VITALS
SYSTOLIC BLOOD PRESSURE: 104 MMHG | TEMPERATURE: 98 F | RESPIRATION RATE: 16 BRPM | HEART RATE: 73 BPM | OXYGEN SATURATION: 92 % | DIASTOLIC BLOOD PRESSURE: 59 MMHG | BODY MASS INDEX: 34.72 KG/M2 | WEIGHT: 208.4 LBS | HEIGHT: 65 IN

## 2018-06-30 PROBLEM — I25.119 CORONARY ARTERY DISEASE INVOLVING NATIVE CORONARY ARTERY OF NATIVE HEART WITH ANGINA PECTORIS (HCC): Status: ACTIVE | Noted: 2018-06-30

## 2018-06-30 LAB
ANION GAP SERPL CALCULATED.3IONS-SCNC: 12.7 MMOL/L
BUN BLD-MCNC: 23 MG/DL (ref 6–20)
BUN/CREAT SERPL: 23.7 (ref 7–25)
CALCIUM SPEC-SCNC: 9 MG/DL (ref 8.6–10.5)
CHLORIDE SERPL-SCNC: 101 MMOL/L (ref 98–107)
CO2 SERPL-SCNC: 23.3 MMOL/L (ref 22–29)
CREAT BLD-MCNC: 0.97 MG/DL (ref 0.57–1)
GFR SERPL CREATININE-BSD FRML MDRD: 61 ML/MIN/1.73
GLUCOSE BLD-MCNC: 259 MG/DL (ref 65–99)
GLUCOSE BLDC GLUCOMTR-MCNC: 265 MG/DL (ref 70–130)
GLUCOSE BLDC GLUCOMTR-MCNC: 278 MG/DL (ref 70–130)
POTASSIUM BLD-SCNC: 3.6 MMOL/L (ref 3.5–5.2)
SODIUM BLD-SCNC: 137 MMOL/L (ref 136–145)

## 2018-06-30 PROCEDURE — 93005 ELECTROCARDIOGRAM TRACING: CPT | Performed by: INTERNAL MEDICINE

## 2018-06-30 PROCEDURE — G0378 HOSPITAL OBSERVATION PER HR: HCPCS

## 2018-06-30 PROCEDURE — 99238 HOSP IP/OBS DSCHRG MGMT 30/<: CPT | Performed by: NURSE PRACTITIONER

## 2018-06-30 PROCEDURE — B246ZZ4 ULTRASONOGRAPHY OF RIGHT AND LEFT HEART, TRANSESOPHAGEAL: ICD-10-PCS | Performed by: INTERNAL MEDICINE

## 2018-06-30 PROCEDURE — 80048 BASIC METABOLIC PNL TOTAL CA: CPT | Performed by: INTERNAL MEDICINE

## 2018-06-30 PROCEDURE — 93010 ELECTROCARDIOGRAM REPORT: CPT | Performed by: INTERNAL MEDICINE

## 2018-06-30 PROCEDURE — 94799 UNLISTED PULMONARY SVC/PX: CPT

## 2018-06-30 PROCEDURE — 82962 GLUCOSE BLOOD TEST: CPT

## 2018-06-30 RX ORDER — CARVEDILOL 3.12 MG/1
3.12 TABLET ORAL EVERY 12 HOURS SCHEDULED
Qty: 60 TABLET | Refills: 1 | Status: SHIPPED | OUTPATIENT
Start: 2018-06-30 | End: 2018-07-24 | Stop reason: HOSPADM

## 2018-06-30 RX ADMIN — LEVOTHYROXINE SODIUM 200 MCG: 100 TABLET ORAL at 08:53

## 2018-06-30 RX ADMIN — BUPROPION HYDROCHLORIDE 450 MG: 150 TABLET, EXTENDED RELEASE ORAL at 08:52

## 2018-06-30 RX ADMIN — ASPIRIN 81 MG: 81 TABLET, DELAYED RELEASE ORAL at 08:53

## 2018-06-30 RX ADMIN — LOSARTAN POTASSIUM: 50 TABLET, FILM COATED ORAL at 08:57

## 2018-06-30 RX ADMIN — HYDROCODONE BITARTRATE AND ACETAMINOPHEN 1 TABLET: 10; 325 TABLET ORAL at 06:02

## 2018-06-30 RX ADMIN — OXYCODONE HYDROCHLORIDE AND ACETAMINOPHEN 500 MG: 500 TABLET ORAL at 08:53

## 2018-06-30 RX ADMIN — VORTIOXETINE 5 MG: 5 TABLET, FILM COATED ORAL at 08:53

## 2018-06-30 RX ADMIN — GABAPENTIN 600 MG: 300 CAPSULE ORAL at 09:00

## 2018-06-30 RX ADMIN — CARVEDILOL 3.12 MG: 3.12 TABLET, FILM COATED ORAL at 08:53

## 2018-07-02 ENCOUNTER — TELEPHONE (OUTPATIENT)
Dept: CARDIAC SURGERY | Facility: CLINIC | Age: 53
End: 2018-07-02

## 2018-07-02 PROBLEM — I34.0 MITRAL VALVE INSUFFICIENCY: Status: ACTIVE | Noted: 2018-07-02

## 2018-07-02 PROBLEM — I25.10 CORONARY ARTERY DISEASE INVOLVING NATIVE HEART WITHOUT ANGINA PECTORIS: Status: ACTIVE | Noted: 2018-07-02

## 2018-07-02 LAB
HCT VFR BLDA CALC: 42 % (ref 38–51)
HGB BLDA-MCNC: 14.3 G/DL (ref 12–17)
SAO2 % BLDA: 66 % (ref 95–98)

## 2018-07-04 ENCOUNTER — RESULTS ENCOUNTER (OUTPATIENT)
Dept: OTHER | Facility: HOSPITAL | Age: 53
End: 2018-07-04

## 2018-07-04 DIAGNOSIS — I25.10 CORONARY ARTERY DISEASE INVOLVING NATIVE HEART WITHOUT ANGINA PECTORIS, UNSPECIFIED VESSEL OR LESION TYPE: ICD-10-CM

## 2018-07-04 DIAGNOSIS — I34.0 MITRAL VALVE INSUFFICIENCY, UNSPECIFIED ETIOLOGY: ICD-10-CM

## 2018-07-18 ENCOUNTER — HOSPITAL ENCOUNTER (OUTPATIENT)
Dept: CARDIOLOGY | Facility: HOSPITAL | Age: 53
Discharge: HOME | End: 2018-07-18
Admitting: NURSE PRACTITIONER

## 2018-07-18 ENCOUNTER — HOSPITAL ENCOUNTER (OUTPATIENT)
Dept: CARDIOLOGY | Facility: HOSPITAL | Age: 53
Discharge: HOME | End: 2018-07-18

## 2018-07-18 ENCOUNTER — APPOINTMENT (OUTPATIENT)
Dept: PREADMISSION TESTING | Facility: HOSPITAL | Age: 53
End: 2018-07-18

## 2018-07-18 ENCOUNTER — HOSPITAL ENCOUNTER (OUTPATIENT)
Dept: GENERAL RADIOLOGY | Facility: HOSPITAL | Age: 53
Discharge: HOME | End: 2018-07-18

## 2018-07-18 ENCOUNTER — HOSPITAL ENCOUNTER (OUTPATIENT)
Dept: RESPIRATORY THERAPY | Facility: HOSPITAL | Age: 53
Discharge: HOME | End: 2018-07-18

## 2018-07-18 ENCOUNTER — ANESTHESIA EVENT (OUTPATIENT)
Dept: PERIOP | Facility: HOSPITAL | Age: 53
End: 2018-07-18

## 2018-07-18 VITALS
HEIGHT: 64 IN | HEART RATE: 76 BPM | OXYGEN SATURATION: 98 % | TEMPERATURE: 97.6 F | SYSTOLIC BLOOD PRESSURE: 123 MMHG | BODY MASS INDEX: 36.54 KG/M2 | RESPIRATION RATE: 16 BRPM | WEIGHT: 214 LBS | DIASTOLIC BLOOD PRESSURE: 81 MMHG

## 2018-07-18 DIAGNOSIS — I34.0 MITRAL VALVE INSUFFICIENCY, UNSPECIFIED ETIOLOGY: ICD-10-CM

## 2018-07-18 DIAGNOSIS — R79.1 ABNORMAL COAGULATION PROFILE: ICD-10-CM

## 2018-07-18 DIAGNOSIS — I25.10 CORONARY ARTERY DISEASE INVOLVING NATIVE HEART WITHOUT ANGINA PECTORIS, UNSPECIFIED VESSEL OR LESION TYPE: ICD-10-CM

## 2018-07-18 DIAGNOSIS — I65.23 OCCLUSION AND STENOSIS OF BILATERAL CAROTID ARTERIES: ICD-10-CM

## 2018-07-18 DIAGNOSIS — E11.59 TYPE 2 DIABETES MELLITUS WITH OTHER CIRCULATORY COMPLICATIONS (CODE): ICD-10-CM

## 2018-07-18 DIAGNOSIS — I25.110 ATHEROSCLEROTIC HEART DISEASE OF NATIVE CORONARY ARTERY WITH UNSTABLE ANGINA PECTORIS (HCC): ICD-10-CM

## 2018-07-18 DIAGNOSIS — Z01.818 PRE-OPERATIVE CLEARANCE: Primary | ICD-10-CM

## 2018-07-18 LAB
ABO GROUP BLD: NORMAL
ALBUMIN SERPL-MCNC: 3.8 G/DL (ref 3.5–5.2)
ALBUMIN/GLOB SERPL: 1.1 G/DL
ALP SERPL-CCNC: 100 U/L (ref 39–117)
ALT SERPL W P-5'-P-CCNC: 15 U/L (ref 1–33)
ANION GAP SERPL CALCULATED.3IONS-SCNC: 9.9 MMOL/L
APTT PPP: 30.4 SECONDS (ref 22.7–35.4)
ARTERIAL PATENCY WRIST A: ABNORMAL
AST SERPL-CCNC: 14 U/L (ref 1–32)
ATMOSPHERIC PRESS: 753.3 MMHG
BACTERIA UR QL AUTO: NORMAL /HPF
BASE EXCESS BLDA CALC-SCNC: -0.3 MMOL/L (ref 0–2)
BASOPHILS # BLD AUTO: 0.05 10*3/MM3 (ref 0–0.2)
BASOPHILS NFR BLD AUTO: 0.8 % (ref 0–1.5)
BDY SITE: ABNORMAL
BH CV XLRA MEAS - DIST GSV CALF DIST LEFT: 0.32 CM
BH CV XLRA MEAS - DIST GSV CALF DIST RIGHT: 0.29 CM
BH CV XLRA MEAS - DIST GSV THIGH DIST LEFT: 0.55 CM
BH CV XLRA MEAS - DIST GSV THIGH DIST RIGHT: 0.44 CM
BH CV XLRA MEAS - GSV ANKLE DIST LEFT: 0.34 CM
BH CV XLRA MEAS - GSV ANKLE DIST RIGHT: 0.34 CM
BH CV XLRA MEAS - GSV KNEE DIST LEFT: 0.4 CM
BH CV XLRA MEAS - GSV KNEE DIST RIGHT: 0.4 CM
BH CV XLRA MEAS - GSV ORIGIN DIST LEFT: 0.44 CM
BH CV XLRA MEAS - GSV ORIGIN DIST RIGHT: 0.58 CM
BH CV XLRA MEAS - MID GSV CALF LEFT: 0.33 CM
BH CV XLRA MEAS - MID GSV CALF RIGHT: 0.35 CM
BH CV XLRA MEAS - MID GSV THIGH  LEFT: 0.42 CM
BH CV XLRA MEAS - MID GSV THIGH  RIGHT: 0.4 CM
BH CV XLRA MEAS - PROX GSV CALF DIST LEFT: 0.41 CM
BH CV XLRA MEAS - PROX GSV CALF DIST RIGHT: 0.36 CM
BH CV XLRA MEAS - PROX GSV THIGH  LEFT: 0.54 CM
BH CV XLRA MEAS - PROX GSV THIGH  RIGHT: 0.41 CM
BH CV XLRA MEAS LEFT CCA RATIO VEL: 86.2 CM/SEC
BH CV XLRA MEAS LEFT DIST CCA EDV: 31.8 CM/SEC
BH CV XLRA MEAS LEFT DIST CCA PSV: 86.2 CM/SEC
BH CV XLRA MEAS LEFT DIST ICA EDV: -38.1 CM/SEC
BH CV XLRA MEAS LEFT DIST ICA PSV: -85.5 CM/SEC
BH CV XLRA MEAS LEFT ICA RATIO VEL: -113 CM/SEC
BH CV XLRA MEAS LEFT ICA/CCA RATIO: -1.3
BH CV XLRA MEAS LEFT MID ICA EDV: -38.1 CM/SEC
BH CV XLRA MEAS LEFT MID ICA PSV: -99.3 CM/SEC
BH CV XLRA MEAS LEFT PROX CCA EDV: 28.7 CM/SEC
BH CV XLRA MEAS LEFT PROX CCA PSV: 91.8 CM/SEC
BH CV XLRA MEAS LEFT PROX ECA EDV: -41.8 CM/SEC
BH CV XLRA MEAS LEFT PROX ECA PSV: -171 CM/SEC
BH CV XLRA MEAS LEFT PROX ICA EDV: -55.2 CM/SEC
BH CV XLRA MEAS LEFT PROX ICA PSV: -113 CM/SEC
BH CV XLRA MEAS LEFT PROX SCLA PSV: 197 CM/SEC
BH CV XLRA MEAS LEFT VERTEBRAL A EDV: -21.9 CM/SEC
BH CV XLRA MEAS LEFT VERTEBRAL A PSV: -60.6 CM/SEC
BH CV XLRA MEAS RIGHT CCA RATIO VEL: 89.7 CM/SEC
BH CV XLRA MEAS RIGHT DIST CCA EDV: 39.9 CM/SEC
BH CV XLRA MEAS RIGHT DIST CCA PSV: 89.7 CM/SEC
BH CV XLRA MEAS RIGHT DIST ICA EDV: -41.8 CM/SEC
BH CV XLRA MEAS RIGHT DIST ICA PSV: -93.7 CM/SEC
BH CV XLRA MEAS RIGHT ICA RATIO VEL: -93.7 CM/SEC
BH CV XLRA MEAS RIGHT ICA/CCA RATIO: -1
BH CV XLRA MEAS RIGHT MID ICA EDV: -35 CM/SEC
BH CV XLRA MEAS RIGHT MID ICA PSV: -91.2 CM/SEC
BH CV XLRA MEAS RIGHT PROX CCA EDV: 31.7 CM/SEC
BH CV XLRA MEAS RIGHT PROX CCA PSV: 106 CM/SEC
BH CV XLRA MEAS RIGHT PROX ECA EDV: -24.3 CM/SEC
BH CV XLRA MEAS RIGHT PROX ECA PSV: -115 CM/SEC
BH CV XLRA MEAS RIGHT PROX ICA EDV: -38.1 CM/SEC
BH CV XLRA MEAS RIGHT PROX ICA PSV: -88.7 CM/SEC
BH CV XLRA MEAS RIGHT PROX SCLA PSV: 114 CM/SEC
BH CV XLRA MEAS RIGHT VERTEBRAL A EDV: 10.6 CM/SEC
BH CV XLRA MEAS RIGHT VERTEBRAL A PSV: 33.4 CM/SEC
BILIRUB SERPL-MCNC: 0.3 MG/DL (ref 0.1–1.2)
BILIRUB UR QL STRIP: NEGATIVE
BLD GP AB SCN SERPL QL: NEGATIVE
BUN BLD-MCNC: 11 MG/DL (ref 6–20)
BUN/CREAT SERPL: 10.6 (ref 7–25)
CALCIUM SPEC-SCNC: 9.4 MG/DL (ref 8.6–10.5)
CHLORIDE SERPL-SCNC: 102 MMOL/L (ref 98–107)
CHOLEST SERPL-MCNC: 170 MG/DL (ref 0–200)
CLARITY UR: CLEAR
CLOSE TME COLL+ADP + EPINEP PNL BLD: 97 % (ref 86–100)
CO2 SERPL-SCNC: 27.1 MMOL/L (ref 22–29)
COLOR UR: YELLOW
CREAT BLD-MCNC: 1.04 MG/DL (ref 0.57–1)
DEPRECATED RDW RBC AUTO: 46.6 FL (ref 37–54)
EOSINOPHIL # BLD AUTO: 0.29 10*3/MM3 (ref 0–0.7)
EOSINOPHIL NFR BLD AUTO: 4.5 % (ref 0.3–6.2)
ERYTHROCYTE [DISTWIDTH] IN BLOOD BY AUTOMATED COUNT: 13.9 % (ref 11.7–13)
GFR SERPL CREATININE-BSD FRML MDRD: 56 ML/MIN/1.73
GLOBULIN UR ELPH-MCNC: 3.4 GM/DL
GLUCOSE BLD-MCNC: 132 MG/DL (ref 65–99)
GLUCOSE UR STRIP-MCNC: NEGATIVE MG/DL
HBA1C MFR BLD: 6.97 % (ref 4.8–5.6)
HCG SERPL QL: NEGATIVE
HCO3 BLDA-SCNC: 25.9 MMOL/L (ref 22–28)
HCT VFR BLD AUTO: 43.9 % (ref 35.6–45.5)
HDLC SERPL-MCNC: 46 MG/DL (ref 40–60)
HGB BLD-MCNC: 14.5 G/DL (ref 11.9–15.5)
HGB UR QL STRIP.AUTO: ABNORMAL
HYALINE CASTS UR QL AUTO: NORMAL /LPF
IMM GRANULOCYTES # BLD: 0.01 10*3/MM3 (ref 0–0.03)
IMM GRANULOCYTES NFR BLD: 0.2 % (ref 0–0.5)
INR PPP: 1.05 (ref 0.9–1.1)
KETONES UR QL STRIP: NEGATIVE
LDLC SERPL CALC-MCNC: 99 MG/DL (ref 0–100)
LDLC/HDLC SERPL: 2.15 {RATIO}
LEFT ARM BP: NORMAL MMHG
LEUKOCYTE ESTERASE UR QL STRIP.AUTO: NEGATIVE
LYMPHOCYTES # BLD AUTO: 2.92 10*3/MM3 (ref 0.9–4.8)
LYMPHOCYTES NFR BLD AUTO: 45.3 % (ref 19.6–45.3)
MCH RBC QN AUTO: 30.1 PG (ref 26.9–32)
MCHC RBC AUTO-ENTMCNC: 33 G/DL (ref 32.4–36.3)
MCV RBC AUTO: 91.3 FL (ref 80.5–98.2)
MODALITY: ABNORMAL
MONOCYTES # BLD AUTO: 0.58 10*3/MM3 (ref 0.2–1.2)
MONOCYTES NFR BLD AUTO: 9 % (ref 5–12)
NEUTROPHILS # BLD AUTO: 2.6 10*3/MM3 (ref 1.9–8.1)
NEUTROPHILS NFR BLD AUTO: 40.4 % (ref 42.7–76)
NITRITE UR QL STRIP: NEGATIVE
NT-PROBNP SERPL-MCNC: 524.3 PG/ML (ref 0–900)
PCO2 BLDA: 47.1 MM HG (ref 35–45)
PH BLDA: 7.35 PH UNITS (ref 7.35–7.45)
PH UR STRIP.AUTO: 6.5 [PH] (ref 5–8)
PLATELET # BLD AUTO: 199 10*3/MM3 (ref 140–500)
PMV BLD AUTO: 11.4 FL (ref 6–12)
PO2 BLDA: 79.2 MM HG (ref 80–100)
POTASSIUM BLD-SCNC: 3.9 MMOL/L (ref 3.5–5.2)
PROT SERPL-MCNC: 7.2 G/DL (ref 6–8.5)
PROT UR QL STRIP: NEGATIVE
PROTHROMBIN TIME: 13.5 SECONDS (ref 11.7–14.2)
RBC # BLD AUTO: 4.81 10*6/MM3 (ref 3.9–5.2)
RBC # UR: NORMAL /HPF
REF LAB TEST METHOD: NORMAL
RH BLD: POSITIVE
RIGHT ARM BP: NORMAL MMHG
SAO2 % BLDCOA: 94.8 % (ref 92–99)
SODIUM BLD-SCNC: 139 MMOL/L (ref 136–145)
SP GR UR STRIP: 1.01 (ref 1–1.03)
SQUAMOUS #/AREA URNS HPF: NORMAL /HPF
T&S EXPIRATION DATE: NORMAL
TOTAL RATE: 16 BREATHS/MINUTE
TRIGL SERPL-MCNC: 125 MG/DL (ref 0–150)
UROBILINOGEN UR QL STRIP: ABNORMAL
VLDLC SERPL-MCNC: 25 MG/DL (ref 5–40)
WBC NRBC COR # BLD: 6.44 10*3/MM3 (ref 4.5–10.7)
WBC UR QL AUTO: NORMAL /HPF

## 2018-07-18 PROCEDURE — 93010 ELECTROCARDIOGRAM REPORT: CPT | Performed by: INTERNAL MEDICINE

## 2018-07-18 PROCEDURE — 94799 UNLISTED PULMONARY SVC/PX: CPT

## 2018-07-18 PROCEDURE — 80061 LIPID PANEL: CPT | Performed by: NURSE PRACTITIONER

## 2018-07-18 PROCEDURE — 85610 PROTHROMBIN TIME: CPT | Performed by: NURSE PRACTITIONER

## 2018-07-18 PROCEDURE — A9270 NON-COVERED ITEM OR SERVICE: HCPCS | Performed by: NURSE PRACTITIONER

## 2018-07-18 PROCEDURE — 83880 ASSAY OF NATRIURETIC PEPTIDE: CPT | Performed by: NURSE PRACTITIONER

## 2018-07-18 PROCEDURE — 85576 BLOOD PLATELET AGGREGATION: CPT | Performed by: NURSE PRACTITIONER

## 2018-07-18 PROCEDURE — 36415 COLL VENOUS BLD VENIPUNCTURE: CPT

## 2018-07-18 PROCEDURE — 85730 THROMBOPLASTIN TIME PARTIAL: CPT | Performed by: NURSE PRACTITIONER

## 2018-07-18 PROCEDURE — 82803 BLOOD GASES ANY COMBINATION: CPT | Performed by: FAMILY MEDICINE

## 2018-07-18 PROCEDURE — S0260 H&P FOR SURGERY: HCPCS | Performed by: THORACIC SURGERY (CARDIOTHORACIC VASCULAR SURGERY)

## 2018-07-18 PROCEDURE — 83036 HEMOGLOBIN GLYCOSYLATED A1C: CPT | Performed by: NURSE PRACTITIONER

## 2018-07-18 PROCEDURE — 86920 COMPATIBILITY TEST SPIN: CPT

## 2018-07-18 PROCEDURE — 86850 RBC ANTIBODY SCREEN: CPT | Performed by: NURSE PRACTITIONER

## 2018-07-18 PROCEDURE — 84703 CHORIONIC GONADOTROPIN ASSAY: CPT | Performed by: THORACIC SURGERY (CARDIOTHORACIC VASCULAR SURGERY)

## 2018-07-18 PROCEDURE — 63710000001 MUPIROCIN 2 % OINTMENT: Performed by: NURSE PRACTITIONER

## 2018-07-18 PROCEDURE — 93880 EXTRACRANIAL BILAT STUDY: CPT

## 2018-07-18 PROCEDURE — 85025 COMPLETE CBC W/AUTO DIFF WBC: CPT | Performed by: NURSE PRACTITIONER

## 2018-07-18 PROCEDURE — S0260 H&P FOR SURGERY: HCPCS | Performed by: NURSE PRACTITIONER

## 2018-07-18 PROCEDURE — 81001 URINALYSIS AUTO W/SCOPE: CPT | Performed by: NURSE PRACTITIONER

## 2018-07-18 PROCEDURE — 93005 ELECTROCARDIOGRAM TRACING: CPT

## 2018-07-18 PROCEDURE — 86900 BLOOD TYPING SEROLOGIC ABO: CPT | Performed by: NURSE PRACTITIONER

## 2018-07-18 PROCEDURE — 80053 COMPREHEN METABOLIC PANEL: CPT | Performed by: NURSE PRACTITIONER

## 2018-07-18 PROCEDURE — 71046 X-RAY EXAM CHEST 2 VIEWS: CPT

## 2018-07-18 PROCEDURE — 94060 EVALUATION OF WHEEZING: CPT

## 2018-07-18 PROCEDURE — 93970 EXTREMITY STUDY: CPT

## 2018-07-18 PROCEDURE — 63710000001 CHLORHEXIDINE 0.12 % SOLUTION: Performed by: NURSE PRACTITIONER

## 2018-07-18 PROCEDURE — 36600 WITHDRAWAL OF ARTERIAL BLOOD: CPT | Performed by: FAMILY MEDICINE

## 2018-07-18 PROCEDURE — 86901 BLOOD TYPING SEROLOGIC RH(D): CPT | Performed by: NURSE PRACTITIONER

## 2018-07-18 RX ORDER — ALBUTEROL SULFATE 2.5 MG/3ML
2.5 SOLUTION RESPIRATORY (INHALATION) ONCE AS NEEDED
Status: COMPLETED | OUTPATIENT
Start: 2018-07-18 | End: 2018-07-18

## 2018-07-18 RX ORDER — CHLORHEXIDINE GLUCONATE 0.12 MG/ML
15 RINSE ORAL EVERY 12 HOURS
Status: DISPENSED | OUTPATIENT
Start: 2018-07-18 | End: 2018-07-19

## 2018-07-18 RX ORDER — OMEPRAZOLE 40 MG/1
40 CAPSULE, DELAYED RELEASE ORAL DAILY PRN
COMMUNITY

## 2018-07-18 RX ORDER — PREDNISONE 1 MG/1
5 TABLET ORAL AS NEEDED
COMMUNITY
End: 2018-07-24 | Stop reason: HOSPADM

## 2018-07-18 RX ORDER — IBUPROFEN 800 MG/1
800 TABLET ORAL 3 TIMES DAILY PRN
COMMUNITY
End: 2018-07-24 | Stop reason: HOSPADM

## 2018-07-18 RX ADMIN — ALBUTEROL SULFATE 2.5 MG: 2.5 SOLUTION RESPIRATORY (INHALATION) at 09:37

## 2018-07-18 NOTE — ANESTHESIA PREPROCEDURE EVALUATION
Anesthesia Evaluation                  Airway   Mallampati: II  TM distance: >3 FB  Neck ROM: full  no difficulty expected  Dental - normal exam     Pulmonary - normal exam    breath sounds clear to auscultation  (+) asthma,   (-) decreased breath sounds, wheezes  Cardiovascular - normal exam    Rhythm: regular  Rate: normal    (+) hypertension, valvular problems/murmurs MR, CAD, hyperlipidemia,       Neuro/Psych  GI/Hepatic/Renal/Endo    (+)  GERD,  diabetes mellitus using insulin,     Musculoskeletal     Abdominal  - normal exam   Substance History      OB/GYN          Other        ROS/Med Hx Other: Estimated EF = 45%.     severe mitral regurgitation                Anesthesia Plan    ASA 4     general   (A-line  CVC)  intravenous induction   Anesthetic plan and risks discussed with patient.    Plan discussed with CRNA.

## 2018-07-18 NOTE — H&P
Consults  Date of Service: 2018 9:33 AM  Kevin Tobias MD   Cardiothoracic Surgery       Patient Care Team:  Charlotte Johnson MD as PCP - General  Charlotte Johnson MD as PCP - Family Medicine     Chief complaint: Mitral valve regurgitation        Subjective         History of Present Illness:  Ms. Oquendo is a 53-year-old female who we were asked to see by Dr. James status post LAURO.  The patient was undergoing a preoperative evaluation for hysterectomy and her EKG demonstrated an abnormality.  She subsequently underwent a stress test that demonstrated an area of infarct without ischemia and an echocardiogram that demonstrated significant mitral regurgitation leading to a LAURO yesterday that demonstrated severe MR and posterior leaflet restriction and an EF of 45% with hypokinetic segments.  We have been consulted for evaluation for possible surgical intervention.  The patient denies any symptomology such as increased shortness of breath or fatigue above her norm, she denies chest pain, orthopnea, syncope, or edema, as such there are no precipitating or relieving factors.  The patient has significant comorbidities as outlined below that could explain her asymptomatic presentation.     Primary medical history:  Rheumatoid arthritis with monthly immunosuppressive injections, diabetes mellitus on chronic insulin pump, hypertension, hypothyroidism, bipolar, previous diagnosis of CARLTON-patient states resolved after weight loss, bursitis, chronic lumbar and cervical pain status post epidural steroid injection-last injection 2018, fibromyalgia, GERD, asthma, chronic kidney disease     Surgical history:  Tonsillectomy, , uterine ablation     Social history:  Quit smoking 3/2018 after a 30-pack-year history, denies EtOH, denies illicits, denies herbal use.  , lives with mother who is next of kin and decision maker in the event that she is unable, has 2 teenage children.  Has been disabled  from office work for 6 years, is relatively sedentary, does not have any routine exercise     Family history: Noncontributory in first degree relatives     Review of Systems   Constitutional: Positive for fatigue. Negative for activity change, diaphoresis and fever.   HENT: Negative for dental problem and mouth sores.    Respiratory: Negative for apnea, cough, chest tightness, shortness of breath and wheezing.    Cardiovascular: Negative for chest pain, palpitations and leg swelling.   Gastrointestinal: Negative for abdominal pain, blood in stool, diarrhea, nausea and vomiting.   Genitourinary: Negative for difficulty urinating, dysuria, flank pain, frequency, hematuria and urgency.   Musculoskeletal: Positive for arthralgias, back pain, myalgias and neck pain. Negative for gait problem.   Skin: Negative for pallor, rash and wound.   Allergic/Immunologic: Positive for immunocompromised state. Negative for environmental allergies.   Neurological: Negative for dizziness, seizures, syncope, weakness, light-headedness and numbness.   Hematological: Does not bruise/bleed easily.   Psychiatric/Behavioral: Negative.          Medical History        Past Medical History:   Diagnosis Date   • Asthma     • Bipolar I disorder, single manic episode     • Bursitis     • Chronic pain syndrome     • Diabetes mellitus     • Disc degeneration, lumbar     • Elbow joint pain       unrelenting   • Elbow pain       left   • Esophageal reflux     • Fibromyalgia     • Hypertension     • Obstructive sleep apnea       Not on CPAP   • PCOS (polycystic ovarian syndrome)     • Rheumatoid arthritis     • Statin intolerance       Severe myalgias    • Vitamin D deficiency           Surgical History   Past Surgical History:   Procedure Laterality Date   • ABDOMINAL SURGERY       •  SECTION       • TONSILLECTOMY       • UTERINE FIBROID SURGERY         Ablasion               Family History   Problem Relation Age of Onset   • Anemia Other      • Arthritis Other     • Asthma Other     • Depression Other           with anxiety   • Diabetes Other     • Hypertension Other     • Allergies Other     • Heart disease Other     • Diabetes Father     • Stroke Paternal Grandmother     • Heart attack Paternal Grandfather     • Diabetes Paternal Grandfather              Social History   Substance Use Topics   • Smoking status: Former Smoker       Packs/day: 1.00       Years: 30.00       Types: Cigarettes       Quit date: 3/28/2018   • Smokeless tobacco: Never Used   • Alcohol use No      Prescriptions Prior to Admission           Prescriptions Prior to Admission   Medication Sig Dispense Refill Last Dose   • Ascorbic Acid (VITAMIN C PO) Take 1 capsule by mouth daily.     6/27/2018 at Unknown time   • buPROPion XL (WELLBUTRIN XL) 150 MG 24 hr tablet Take 3 tablets by mouth daily.     6/27/2018 at Unknown time   • clonazePAM (KlonoPIN) 1 MG tablet Take 1 mg by mouth 4 (Four) Times a Day As Needed.     Past Week at Unknown time   • fexofenadine (ALLEGRA) 180 MG tablet Take 180 mg by mouth Daily.     6/27/2018 at Unknown time   • gabapentin (NEURONTIN) 600 MG tablet Take 600 mg by mouth 3 (Three) Times a Day.     6/27/2018 at Unknown time   • glucose blood test strip Marcial Contour Next Test In Vitro Strip; Patient Sig: Marcial Contour Next Test In Vitro Strip ; 200; 0; 30-Apr-2013; Active     6/28/2018 at Unknown time   • HYDROcodone-acetaminophen (NORCO)  MG per tablet Take 1 tablet by mouth Every 6 (Six) Hours As Needed for Moderate Pain .     6/28/2018 at Unknown time   • levothyroxine (SYNTHROID, LEVOTHROID) 200 MCG tablet Take 200 mcg by mouth Daily.     6/28/2018 at Unknown time   • Misc. Devices (SYMPHONY DOUBLE PUMPING SYSTEM) misc       Past Week at Unknown time   • mometasone-formoterol (DULERA 100) 100-5 MCG/ACT inhaler Inhale 2 puffs twice daily     6/27/2018 at Unknown time   • montelukast (SINGULAIR) 10 MG tablet Take 1 tablet by mouth nightly.      6/27/2018 at Unknown time   • olmesartan-hydrochlorothiazide (BENICAR HCT) 40-12.5 MG per tablet Take 1 tablet by mouth Daily.     6/28/2018 at Unknown time   • QUEtiapine (SEROquel) 300 MG tablet Take 1 tablet by mouth Every Night.     6/27/2018 at Unknown time   • vitamin E 400 UNIT capsule Take by mouth. Take as directed.     6/27/2018 at Unknown time   • Vortioxetine HBr (TRINTELLIX) 5 MG tablet Take 5 mg by mouth Daily With Breakfast.     6/28/2018 at Unknown time   • zolpidem (AMBIEN) 10 MG tablet Take 10 mg by mouth At Night As Needed for Sleep.     6/27/2018 at Unknown time   • aspirin 81 MG tablet Take 1 tablet by mouth daily.     Unknown at Unknown time   • budesonide (PULMICORT) 0.5 MG/2ML nebulizer solution 2 (two) times a day. Use 1 unit dose via nebulizer two times a day     Taking   • fluticasone (FLONASE) 50 MCG/ACT nasal spray 2 sprays into each nostril Daily.     Taking   • folic acid (FOLVITE) 1 MG tablet Take 1 tablet by mouth daily.     Taking   • ibuprofen (ADVIL,MOTRIN) 800 MG tablet Take 800 mg by mouth 3 (Three) Times a Day As Needed.     Taking   • insulin lispro (HumaLOG) 100 UNIT/ML injection Inject under the skin.     Taking   • levothyroxine (SYNTHROID, LEVOTHROID) 175 MCG tablet Take 1 tablet by mouth daily.     Taking   • methocarbamol (ROBAXIN) 750 MG tablet Take 1 tablet by mouth 3 (Three) Times a Week if Needed.     Taking   • mometasone-formoterol (DULERA 100) 100-5 MCG/ACT inhaler Inhale 2 puffs 2 (Two) Times a Day.     Taking   • Omega-3 Fatty Acids (FISH OIL) 1200 MG capsule capsule Take by mouth. Take as directed.     Taking   • PredniSONE 5 MG tablet delayed-release Take  by mouth.     More than a month at Unknown time   • Tofacitinib Citrate 5 MG tablet Take 1 tablet by mouth 2 (two) times a day.     Taking   • topiramate (TOPAMAX) 100 MG tablet Take 3 tablets by mouth Every Night.     Taking            aspirin 81 mg Oral Daily   budesonide-formoterol 2 puff Inhalation BID -  "RT   buPROPion  mg Oral Daily   carvedilol 3.125 mg Oral Q12H   cetirizine 10 mg Oral Daily   gabapentin 600 mg Oral Q8H   levothyroxine 200 mcg Oral Daily   losartan-HCTZ 100-12.5 combo dose   Oral Daily   methocarbamol 750 mg Oral Q12H   montelukast 10 mg Oral Nightly   predniSONE 5 mg Oral Daily   QUEtiapine 300 mg Oral Nightly   topiramate 300 mg Oral Nightly   vitamin C 500 mg Oral Daily   Vortioxetine HBr 5 mg Oral Daily With Breakfast      Allergies:  Statins and Sulfa antibiotics        Objective          Vital Signs  Temp:  [97.7 °F (36.5 °C)-98.1 °F (36.7 °C)] 97.9 °F (36.6 °C)  Heart Rate:  [70-91] 70  Resp:  [16-18] 18  BP: (104-163)/(43-98) 136/84         Flowsheet Rows      First Filed Value   Admission Height  165.1 cm (65\") Documented at 06/28/2018 1012   Admission Weight  94.5 kg (208 lb 6.4 oz) Documented at 06/28/2018 1012          165.1 cm (65\")     Physical Exam   Constitutional: She is oriented to person, place, and time. She appears well-developed and well-nourished. She is cooperative.   Eyes: No scleral icterus.   Neck: Neck supple. Normal carotid pulses present. Carotid bruit is not present.   Cardiovascular: Normal rate, regular rhythm and intact distal pulses.  Exam reveals no gallop and no friction rub.    Murmur heard.   Systolic murmur is present with a grade of 2/6   Pulses:       Carotid pulses are 2+ on the right side, and 2+ on the left side.       Radial pulses are 2+ on the right side, and 2+ on the left side.        Dorsalis pedis pulses are 2+ on the right side, and 2+ on the left side.        Posterior tibial pulses are 2+ on the right side, and 2+ on the left side.   Pulmonary/Chest: Effort normal and breath sounds normal. She has no wheezes. She has no rales.   Abdominal: Soft. Bowel sounds are normal. She exhibits no distension, no abdominal bruit and no mass. There is no hepatosplenomegaly. There is no tenderness. There is no guarding and no CVA tenderness. "   Musculoskeletal: She exhibits no edema, tenderness or deformity.     Skin Integrity  -  Her right foot skin is intact.Her left foot skin is intact..  Lymphadenopathy:     She has no cervical adenopathy.   Neurological: She is alert and oriented to person, place, and time.   Skin: Skin is warm and dry. Capillary refill takes less than 2 seconds. No rash noted. No cyanosis or erythema. Nails show no clubbing.   Psychiatric: She has a normal mood and affect. Her speech is normal and behavior is normal. Judgment and thought content normal.         Results Review:           Lab Results (last 24 hours)      Procedure Component Value Units Date/Time     T4, Free [[id number]]  (Normal) Collected:  06/29/18 0322     Specimen:  Blood Updated:  06/29/18 0452       Free T4 1.25 ng/dL       TSH [[id number]]  (Normal) Collected:  06/29/18 0322     Specimen:  Blood Updated:  06/29/18 0452       TSH 0.351 mIU/mL       Basic Metabolic Panel [[id number]]  (Abnormal) Collected:  06/29/18 0322     Specimen:  Blood Updated:  06/29/18 0445       Glucose 72 mg/dL         BUN 19 mg/dL         Creatinine 0.99 mg/dL         Sodium 140 mmol/L         Potassium 3.4 (L) mmol/L         Chloride 105 mmol/L         CO2 25.7 mmol/L         Calcium 9.0 mg/dL         eGFR Non African Amer 59 (L) mL/min/1.73         BUN/Creatinine Ratio 19.2       Anion Gap 9.3 mmol/L       Narrative:        GFR Normal >60  Chronic Kidney Disease <60  Kidney Failure <15     CBC (No Diff) [[id number]]  (Abnormal) Collected:  06/29/18 0322     Specimen:  Blood Updated:  06/29/18 0413       WBC 7.06 10*3/mm3         RBC 4.53 10*6/mm3         Hemoglobin 13.4 g/dL         Hematocrit 42.2 %         MCV 93.2 fL         MCH 29.6 pg         MCHC 31.8 (L) g/dL         RDW 13.8 (H) %         RDW-SD 46.8 fl         MPV 11.8 fL         Platelets 171 10*3/mm3       Comprehensive Metabolic Panel [[id number]]  (Abnormal) Collected:  06/28/18 1119     Specimen:  Blood Updated:  06/28/18  1216       Glucose 80 mg/dL         BUN 14 mg/dL         Creatinine 1.09 (H) mg/dL         Sodium 143 mmol/L         Potassium 3.7 mmol/L         Chloride 107 mmol/L         CO2 24.9 mmol/L         Calcium 9.4 mg/dL         Total Protein 7.7 g/dL         Albumin 3.90 g/dL         ALT (SGPT) 13 U/L         AST (SGOT) 15 U/L         Alkaline Phosphatase 88 U/L         Total Bilirubin 0.3 mg/dL         eGFR Non African Amer 53 (L) mL/min/1.73         Globulin 3.8 gm/dL         A/G Ratio 1.0 g/dL         BUN/Creatinine Ratio 12.8       Anion Gap 11.1 mmol/L       Protime-INR [[id number]]  (Abnormal) Collected:  06/28/18 1119     Specimen:  Blood Updated:  06/28/18 1201       Protime 14.3 (H) Seconds         INR 1.13 (H)     CBC & Differential [[id number]] Collected:  06/28/18 1119     Specimen:  Blood Updated:  06/28/18 1154     Narrative:        The following orders were created for panel order CBC & Differential.  Procedure                               Abnormality         Status                     ---------                               -----------         ------                     CBC Auto Differential[[id number]]        Abnormal            Final result                  Please view results for these tests on the individual orders.     CBC Auto Differential [[id number]]  (Abnormal) Collected:  06/28/18 1119     Specimen:  Blood Updated:  06/28/18 1154       WBC 9.75 10*3/mm3         RBC 4.82 10*6/mm3         Hemoglobin 14.6 g/dL         Hematocrit 44.0 %         MCV 91.3 fL         MCH 30.3 pg         MCHC 33.2 g/dL         RDW 14.1 (H) %         RDW-SD 47.5 fl         MPV 11.8 fL         Platelets 198 10*3/mm3         Neutrophil % 46.6 %         Lymphocyte % 44.0 %         Monocyte % 7.2 %         Eosinophil % 1.7 %         Basophil % 0.5 %         Immature Grans % 0.3 %         Neutrophils, Absolute 4.54 10*3/mm3         Lymphocytes, Absolute 4.29 10*3/mm3         Monocytes, Absolute 0.70 10*3/mm3          Eosinophils, Absolute 0.17 10*3/mm3         Basophils, Absolute 0.05 10*3/mm3         Immature Grans, Absolute 0.03 10*3/mm3                        Assessment/Plan         Active Problems:    Severe mitral regurgitation        Assessment & Plan     -Mitral regurgitation----no symptomology of heart failure  -LV dysfxn, EF 45%  -DM II----insulin pump, will consult endocrinology for assistance post op  -Rheumatoid arthritis----next immunosuppressive injection due 7/20/18  -Chronic neck and back pain, fibromyalgia----on neurontin and norco every 6 hours routinely, last epidural injection 6/26/18  -HTN---controlled on current medication  -CKD----creatinine wnl  -Hypothyroidism----thyroid studies wnl  -Asthma---on routine inhalers  -Hx tobacco abuse, 30 pack year history----Quit 3/2018  -Bipolar disorder     STS calculation:  MV and CAB  Risk of Mortality: 2.926%   Morbidity or Mortality: 28.003%   Long Length of Stay: 14.991%   Short Length of Stay: 16.659%   Permanent Stroke: 1.44%   Prolonged Ventilation: 19.022%   DSW Infection: 1.146%   Renal Failure: 8.493%   Reoperation: 9.757%      MV only  Risk of Mortality: 1.779%   Morbidity or Mortality: 19.43%   Long Length of Stay: 10.369%   Short Length of Stay: 25.888%   Permanent Stroke: 0.696%   Prolonged Ventilation: 11.097%   DSW Infection: 0.292%   Renal Failure: 5.512%   Reoperation: 6.266%      Scheduled for cardiac cath today, will schedule for surgery with Dr. Tobias after review of films, our office will call with PAT and surgery arrangements.  Patient ok for discharge from CTS standpoint.  If valve unable to be repaired, patient prefers mechanical valve if possible.  Thank you for allowing us to participate in the care of this patient.       1700 addendum:  Dr. Tobias has reviewed her cath films and she will need CABG and MVR.  We will schedule for 7/19 or 7/20 to allow her immunosuppressant time to dissipate prior to OR.  Our office with call the patient with  arrangements.       KALYANI Sims  06/29/18  9:33 AM     Addendum  Severe MR and inferior wall dyskinesis. I recommend MV repair and possible CABG based on the upcoming cath. She rheumatoid arthritis and she is on a montly injectable immunosupressor. I would favor surgery on the last week post dose.  Kevin segal M.D.              7/18/2018 Interval H&P: I saw Ms. Oquendo in PAT today.  The patient states that there have been no changes to her H&P since last seen by Dr. Segal in the hospital on 6/29/2018.  All of her preoperative studies are within normal limits including a urinalysis that is clear carotid study with mild bilateral internal carotid stenosis and vein mapping with adequate conduit.  Her chest x-ray is pending, her ABG had a mild respiratory acidosis with a pH of 7.34, PCO2 of 47.1, PO2 79, she has a significant history of heavy smoking.  She is to discontinue her insulin pump prior to presentation to the hospital, she has not received any immunosuppressive injections since 6/26/2018, we will consult endocrinology for assistance postoperatively, she sees Dr. Tate as an outpatient.  We spent a significant amount of time discussing the expected postoperative course and answering any questions that she may have had.  She is scheduled for CABG MVR with Dr. Segal tomorrow morning.  The patient states that if her valve is unable to be repaired she would prefer to have a mechanical MVR in hopes that she would not have to undergo a repeat surgery in her lifetime.      KALYANI Ivy  Cardiothoracic surgery

## 2018-07-18 NOTE — DISCHARGE INSTRUCTIONS
Take the following medications the morning of surgery with a small sip of water: NONE        General Instructions:  • Do not eat solid food after midnight the night before surgery.  • You may drink clear liquids day of surgery but must stop at least one hour before your hospital arrival time.  • It is beneficial for you to have a clear drink that contains carbohydrates the day of surgery.  We suggest a 12 to 20 ounce bottle of Gatorade or Powerade for non-diabetic patients or a 12 to 20 ounce bottle of G2 or Powerade Zero for diabetic patients.     Clear liquids are liquids you can see through.  Nothing red in color.     Plain water                               Sports drinks  Sodas                                   Gelatin (Jell-O)  Fruit juices without pulp such as white grape juice and apple juice  Popsicles that contain no fruit or yogurt  Tea or coffee (no cream or milk added)  Gatorade / Powerade  G2 / Powerade Zero    • Bring any papers given to you in the doctor’s office.  • Wear clean comfortable clothes and socks.  • Do not wear contact lenses or make-up.  Bring a case for your glasses.   • Remove all piercings.  Leave jewelry and any other valuables at home.  • The Pre-Admission Testing nurse will instruct you to bring medications if unable to obtain an accurate list in Pre-Admission Testing.        If you were given a blood bank ID arm band remember to bring it with you the day of surgery.    Preventing a Surgical Site Infection:  • For 2 to 3 days before surgery, avoid shaving with a razor because the razor can irritate skin and make it easier to develop an infection.    • Any areas of open skin can increase the risk of a post-operative wound infection by allowing bacteria to enter and travel throughout the body.  Notify your surgeon if you have any skin wounds / rashes even if it is not near the expected surgical site.  The area will need assessed to determine if surgery should be delayed until it is  healed.  • The night prior to surgery sleep in a clean bed with clean clothing.  Do not allow pets to sleep with you.  • Shower on the morning of surgery using a fresh bar of anti-bacterial soap (such as Dial) and clean washcloth.  Dry with a clean towel and dress in clean clothing.  • Ask your surgeon if you will be receiving antibiotics prior to surgery.  • Make sure you, your family, and all healthcare providers clean their hands with soap and water or an alcohol based hand  before caring for you or your wound.    Day of surgery: 7/19/2018. MAIN OR. ARRIVAL TIME  5AM    Upon arrival, a Pre-op nurse and Anesthesiologist will review your health history, obtain vital signs, and answer questions you may have.  The only belongings needed at this time will be your home medications and if applicable your C-PAP/BI-PAP machine.  If you are staying overnight your family can leave the rest of your belongings in the car and bring them to your room later.  A Pre-op nurse will start an IV and you may receive medication in preparation for surgery, including something to help you relax.  Your family will be able to see you in the Pre-op area.  While you are in surgery your family should notify the waiting room  if they leave the waiting room area and provide a contact phone number.    Please be aware that surgery does come with discomfort.  We want to make every effort to control your discomfort so please discuss any uncontrolled symptoms with your nurse.   Your doctor will most likely have prescribed pain medications.          If you are staying overnight following surgery, you will be transported to your hospital room following the recovery period.  [facility] has all private rooms.        2% CHLORAHEXIDINE GLUCONATE* CLOTH  Preparing or “prepping” skin before surgery can reduce the risk of infection at the surgical site. To make the process easier, [facility] has chosen  disposable cloths moistened with a rinse-free, 2% Chlorhexidine Gluconate (CHG) antiseptic solution. The steps below outline the prepping process and should be carefully followed.        Use the prep cloth on the area that is circled in the diagram             Directions Night before Surgery  1) Shower using a fresh bar of anti-bacterial soap (such as Dial) and clean washcloth.  Use a clean towel to completely dry your skin.  2) Do not use any lotions, oils or creams on your skin.  3) Open the package and remove 1 cloth, wipe your skin for 30 seconds in a circular motion.  Allow to dry for 3 minutes.  4) Repeat #3 with second cloth.  5) Do not touch your eyes, ears, or mouth with the prep cloth.  6) Allow the wet prep solution to air dry.  7) Discard the prep cloth and wash your hands with soap and water.   8) Dress in clean bed clothes and sleep on fresh clean bed sheets.   9) You may experience some temporary itching after the prep.    Directions Day of Surgery  1) Repeat steps 1,2,3,4,5,6,7, and 9.   2) Dress in clean clothes before coming to the hospital.    BACTROBAN NASAL OINTMENT  There are many germs normally in your nose. Bactroban is an ointment that will help reduce these germs. Please follow these instructions for Bactroban use:          _1___The day before surgery in the evening              Date__7/18______    _2___The day of surgery in the morning    Date___7/19_____    **Squirt ½ package of Bactroban Ointment onto a cotton applicator and apply to inside of 1st nostril.  Squirt the remaining Bactroban and apply to the inside of the other nostril.    PERIDEX- ORAL:  Use only if your surgeon has ordered  Use the night before and morning of surgery - Swish, gargle, and spit - do not swallow.    You have received a list of surgical assistants for your reference.  If you have any questions please call Pre-Admission Testing at [phone].  Deductibles and co-payments are collected on the day of service.  Please be prepared to pay the required co-pay, deductible or deposit on the day of service as defined by your plan.

## 2018-07-19 ENCOUNTER — ANCILLARY PROCEDURE (OUTPATIENT)
Dept: PERIOP | Facility: HOSPITAL | Age: 53
End: 2018-07-19
Attending: ANESTHESIOLOGY

## 2018-07-19 ENCOUNTER — ANESTHESIA (OUTPATIENT)
Dept: PERIOP | Facility: HOSPITAL | Age: 53
End: 2018-07-19

## 2018-07-19 ENCOUNTER — HOSPITAL ENCOUNTER (INPATIENT)
Facility: HOSPITAL | Age: 53
LOS: 5 days | Discharge: HOME HEALTH | End: 2018-07-24
Attending: THORACIC SURGERY (CARDIOTHORACIC VASCULAR SURGERY) | Admitting: THORACIC SURGERY (CARDIOTHORACIC VASCULAR SURGERY)

## 2018-07-19 ENCOUNTER — APPOINTMENT (OUTPATIENT)
Dept: GENERAL RADIOLOGY | Facility: HOSPITAL | Age: 53
End: 2018-07-19

## 2018-07-19 DIAGNOSIS — I34.0 MITRAL VALVE INSUFFICIENCY, UNSPECIFIED ETIOLOGY: ICD-10-CM

## 2018-07-19 DIAGNOSIS — Z95.1 S/P CABG X 4: ICD-10-CM

## 2018-07-19 DIAGNOSIS — I25.10 CORONARY ARTERY DISEASE INVOLVING NATIVE HEART WITHOUT ANGINA PECTORIS, UNSPECIFIED VESSEL OR LESION TYPE: ICD-10-CM

## 2018-07-19 DIAGNOSIS — I25.119 CORONARY ARTERY DISEASE INVOLVING NATIVE CORONARY ARTERY OF NATIVE HEART WITH ANGINA PECTORIS (HCC): Primary | ICD-10-CM

## 2018-07-19 LAB
ABO + RH BLD: NORMAL
ABO + RH BLD: NORMAL
ABO GROUP BLD: NORMAL
ACT BLD: 131 SECONDS (ref 82–152)
ACT BLD: 131 SECONDS (ref 82–152)
ACT BLD: 136 SECONDS (ref 82–152)
ACT BLD: 389 SECONDS (ref 82–152)
ACT BLD: 472 SECONDS (ref 82–152)
ACT BLD: 477 SECONDS (ref 82–152)
ACT BLD: 516 SECONDS (ref 82–152)
ALBUMIN SERPL-MCNC: 3.8 G/DL (ref 3.5–5.2)
ALBUMIN SERPL-MCNC: 4.5 G/DL (ref 3.5–5.2)
ANION GAP SERPL CALCULATED.3IONS-SCNC: 11.8 MMOL/L
ANION GAP SERPL CALCULATED.3IONS-SCNC: 13.8 MMOL/L
APTT PPP: 34 SECONDS (ref 22.7–35.4)
ARTERIAL PATENCY WRIST A: ABNORMAL
ARTERIAL PATENCY WRIST A: ABNORMAL
ATMOSPHERIC PRESS: 750.9 MMHG
ATMOSPHERIC PRESS: 751.7 MMHG
BASE EXCESS BLDA CALC-SCNC: -2.3 MMOL/L (ref 0–2)
BASE EXCESS BLDA CALC-SCNC: -3.1 MMOL/L (ref 0–2)
BASOPHILS # BLD AUTO: 0.04 10*3/MM3 (ref 0–0.2)
BASOPHILS NFR BLD AUTO: 0.4 % (ref 0–1.5)
BDY SITE: ABNORMAL
BDY SITE: ABNORMAL
BH BB BLOOD EXPIRATION DATE: NORMAL
BH BB BLOOD EXPIRATION DATE: NORMAL
BH BB BLOOD TYPE BARCODE: 5100
BH BB BLOOD TYPE BARCODE: 5100
BH BB DISPENSE STATUS: NORMAL
BH BB DISPENSE STATUS: NORMAL
BH BB PRODUCT CODE: NORMAL
BH BB PRODUCT CODE: NORMAL
BH BB UNIT NUMBER: NORMAL
BH BB UNIT NUMBER: NORMAL
BLD GP AB SCN SERPL QL: NEGATIVE
BUN BLD-MCNC: 13 MG/DL (ref 6–20)
BUN BLD-MCNC: 14 MG/DL (ref 6–20)
BUN/CREAT SERPL: 10.3 (ref 7–25)
BUN/CREAT SERPL: 11.3 (ref 7–25)
CA-I BLD-MCNC: 5.1 MG/DL (ref 4.6–5.4)
CA-I SERPL ISE-MCNC: 1.27 MMOL/L (ref 1.15–1.35)
CALCIUM SPEC-SCNC: 8.4 MG/DL (ref 8.6–10.5)
CALCIUM SPEC-SCNC: 8.9 MG/DL (ref 8.6–10.5)
CHLORIDE SERPL-SCNC: 104 MMOL/L (ref 98–107)
CHLORIDE SERPL-SCNC: 106 MMOL/L (ref 98–107)
CO2 SERPL-SCNC: 21.2 MMOL/L (ref 22–29)
CO2 SERPL-SCNC: 23.2 MMOL/L (ref 22–29)
CREAT BLD-MCNC: 1.24 MG/DL (ref 0.57–1)
CREAT BLD-MCNC: 1.26 MG/DL (ref 0.57–1)
CROSSMATCH INTERPRETATION: NORMAL
CROSSMATCH INTERPRETATION: NORMAL
DEPRECATED RDW RBC AUTO: 46.3 FL (ref 37–54)
DEPRECATED RDW RBC AUTO: 46.7 FL (ref 37–54)
EOSINOPHIL # BLD AUTO: 0.08 10*3/MM3 (ref 0–0.7)
EOSINOPHIL NFR BLD AUTO: 0.7 % (ref 0.3–6.2)
ERYTHROCYTE [DISTWIDTH] IN BLOOD BY AUTOMATED COUNT: 13.7 % (ref 11.7–13)
ERYTHROCYTE [DISTWIDTH] IN BLOOD BY AUTOMATED COUNT: 13.8 % (ref 11.7–13)
FIBRINOGEN PPP-MCNC: 219 MG/DL (ref 219–464)
GFR SERPL CREATININE-BSD FRML MDRD: 45 ML/MIN/1.73
GFR SERPL CREATININE-BSD FRML MDRD: 46 ML/MIN/1.73
GLUCOSE BLD-MCNC: 168 MG/DL (ref 65–99)
GLUCOSE BLD-MCNC: 211 MG/DL (ref 65–99)
GLUCOSE BLDC GLUCOMTR-MCNC: 110 MG/DL (ref 70–130)
GLUCOSE BLDC GLUCOMTR-MCNC: 127 MG/DL (ref 70–130)
GLUCOSE BLDC GLUCOMTR-MCNC: 130 MG/DL (ref 70–130)
GLUCOSE BLDC GLUCOMTR-MCNC: 135 MG/DL (ref 70–130)
GLUCOSE BLDC GLUCOMTR-MCNC: 151 MG/DL (ref 70–130)
GLUCOSE BLDC GLUCOMTR-MCNC: 160 MG/DL (ref 70–130)
GLUCOSE BLDC GLUCOMTR-MCNC: 163 MG/DL (ref 70–130)
GLUCOSE BLDC GLUCOMTR-MCNC: 181 MG/DL (ref 70–130)
GLUCOSE BLDC GLUCOMTR-MCNC: 188 MG/DL (ref 70–130)
GLUCOSE BLDC GLUCOMTR-MCNC: 215 MG/DL (ref 70–130)
GLUCOSE BLDC GLUCOMTR-MCNC: 219 MG/DL (ref 70–130)
GLUCOSE BLDC GLUCOMTR-MCNC: 220 MG/DL (ref 70–130)
HCO3 BLDA-SCNC: 23.7 MMOL/L (ref 22–28)
HCO3 BLDA-SCNC: 24.6 MMOL/L (ref 22–28)
HCT VFR BLD AUTO: 35.4 % (ref 35.6–45.5)
HCT VFR BLD AUTO: 36.2 % (ref 35.6–45.5)
HGB BLD-MCNC: 11.4 G/DL (ref 11.9–15.5)
HGB BLD-MCNC: 11.5 G/DL (ref 11.9–15.5)
HOROWITZ INDEX BLD+IHG-RTO: 100 %
HOROWITZ INDEX BLD+IHG-RTO: 40 %
IMM GRANULOCYTES # BLD: 0.05 10*3/MM3 (ref 0–0.03)
IMM GRANULOCYTES NFR BLD: 0.5 % (ref 0–0.5)
INR PPP: 1.51 (ref 0.9–1.1)
LYMPHOCYTES # BLD AUTO: 1.27 10*3/MM3 (ref 0.9–4.8)
LYMPHOCYTES NFR BLD AUTO: 11.9 % (ref 19.6–45.3)
MAGNESIUM SERPL-MCNC: 2.4 MG/DL (ref 1.6–2.6)
MAGNESIUM SERPL-MCNC: 2.6 MG/DL (ref 1.6–2.6)
MCH RBC QN AUTO: 29.6 PG (ref 26.9–32)
MCH RBC QN AUTO: 30 PG (ref 26.9–32)
MCHC RBC AUTO-ENTMCNC: 31.8 G/DL (ref 32.4–36.3)
MCHC RBC AUTO-ENTMCNC: 32.2 G/DL (ref 32.4–36.3)
MCV RBC AUTO: 93.1 FL (ref 80.5–98.2)
MCV RBC AUTO: 93.2 FL (ref 80.5–98.2)
MODALITY: ABNORMAL
MODALITY: ABNORMAL
MONOCYTES # BLD AUTO: 0.06 10*3/MM3 (ref 0.2–1.2)
MONOCYTES NFR BLD AUTO: 0.6 % (ref 5–12)
NEUTROPHILS # BLD AUTO: 9.2 10*3/MM3 (ref 1.9–8.1)
NEUTROPHILS NFR BLD AUTO: 85.9 % (ref 42.7–76)
O2 A-A PPRESDIFF RESPIRATORY: 0.6 MMHG
O2 A-A PPRESDIFF RESPIRATORY: 0.7 MMHG
PCO2 BLDA: 48.4 MM HG (ref 35–45)
PCO2 BLDA: 50.1 MM HG (ref 35–45)
PEEP RESPIRATORY: 5 CM[H2O]
PEEP RESPIRATORY: 7.5 CM[H2O]
PH BLDA: 7.3 PH UNITS (ref 7.35–7.45)
PH BLDA: 7.3 PH UNITS (ref 7.35–7.45)
PHOSPHATE SERPL-MCNC: 1.2 MG/DL (ref 2.5–4.5)
PHOSPHATE SERPL-MCNC: 3.4 MG/DL (ref 2.5–4.5)
PLATELET # BLD AUTO: 101 10*3/MM3 (ref 140–500)
PLATELET # BLD AUTO: 115 10*3/MM3 (ref 140–500)
PMV BLD AUTO: 11 FL (ref 6–12)
PMV BLD AUTO: 11.5 FL (ref 6–12)
PO2 BLDA: 137 MM HG (ref 80–100)
PO2 BLDA: 453.8 MM HG (ref 80–100)
POTASSIUM BLD-SCNC: 3.6 MMOL/L (ref 3.5–5.2)
POTASSIUM BLD-SCNC: 3.7 MMOL/L (ref 3.5–5.2)
POTASSIUM BLD-SCNC: 3.8 MMOL/L (ref 3.5–5.2)
PROTHROMBIN TIME: 17.9 SECONDS (ref 11.7–14.2)
RBC # BLD AUTO: 3.8 10*6/MM3 (ref 3.9–5.2)
RBC # BLD AUTO: 3.89 10*6/MM3 (ref 3.9–5.2)
RH BLD: POSITIVE
SAO2 % BLDCOA: 100 % (ref 92–99)
SAO2 % BLDCOA: 98.8 % (ref 92–99)
SET MECH RESP RATE: 14
SET MECH RESP RATE: 20
SODIUM BLD-SCNC: 139 MMOL/L (ref 136–145)
SODIUM BLD-SCNC: 141 MMOL/L (ref 136–145)
T&S EXPIRATION DATE: NORMAL
TOTAL RATE: 14 BREATHS/MINUTE
UNIT  ABO: NORMAL
UNIT  ABO: NORMAL
UNIT  RH: NORMAL
UNIT  RH: NORMAL
VENTILATOR MODE: ABNORMAL
VENTILATOR MODE: ABNORMAL
VT ON VENT VENT: 400 ML
VT ON VENT VENT: 550 ML
WBC NRBC COR # BLD: 10.7 10*3/MM3 (ref 4.5–10.7)
WBC NRBC COR # BLD: 14.62 10*3/MM3 (ref 4.5–10.7)

## 2018-07-19 PROCEDURE — 25010000002 PROTAMINE SULFATE PER 10 MG: Performed by: ANESTHESIOLOGY

## 2018-07-19 PROCEDURE — C1751 CATH, INF, PER/CENT/MIDLINE: HCPCS | Performed by: ANESTHESIOLOGY

## 2018-07-19 PROCEDURE — 94002 VENT MGMT INPAT INIT DAY: CPT

## 2018-07-19 PROCEDURE — P9041 ALBUMIN (HUMAN),5%, 50ML: HCPCS | Performed by: THORACIC SURGERY (CARDIOTHORACIC VASCULAR SURGERY)

## 2018-07-19 PROCEDURE — 25010000002 MAGNESIUM SULFATE IN D5W 1G/100ML (PREMIX) 1-5 GM/100ML-% SOLUTION: Performed by: THORACIC SURGERY (CARDIOTHORACIC VASCULAR SURGERY)

## 2018-07-19 PROCEDURE — 25010000002 METOCLOPRAMIDE PER 10 MG: Performed by: THORACIC SURGERY (CARDIOTHORACIC VASCULAR SURGERY)

## 2018-07-19 PROCEDURE — C1713 ANCHOR/SCREW BN/BN,TIS/BN: HCPCS | Performed by: THORACIC SURGERY (CARDIOTHORACIC VASCULAR SURGERY)

## 2018-07-19 PROCEDURE — 02QG0ZZ REPAIR MITRAL VALVE, OPEN APPROACH: ICD-10-PCS | Performed by: THORACIC SURGERY (CARDIOTHORACIC VASCULAR SURGERY)

## 2018-07-19 PROCEDURE — 25010000002 ALBUMIN HUMAN 5% PER 50 ML: Performed by: THORACIC SURGERY (CARDIOTHORACIC VASCULAR SURGERY)

## 2018-07-19 PROCEDURE — 25010000002 MIDAZOLAM PER 1 MG: Performed by: ANESTHESIOLOGY

## 2018-07-19 PROCEDURE — 33427 REPAIR OF MITRAL VALVE: CPT | Performed by: THORACIC SURGERY (CARDIOTHORACIC VASCULAR SURGERY)

## 2018-07-19 PROCEDURE — 85730 THROMBOPLASTIN TIME PARTIAL: CPT | Performed by: THORACIC SURGERY (CARDIOTHORACIC VASCULAR SURGERY)

## 2018-07-19 PROCEDURE — 85027 COMPLETE CBC AUTOMATED: CPT | Performed by: THORACIC SURGERY (CARDIOTHORACIC VASCULAR SURGERY)

## 2018-07-19 PROCEDURE — 25010000002 EPINEPHRINE PER 0.1 MG: Performed by: ANESTHESIOLOGY

## 2018-07-19 PROCEDURE — 63710000001 INSULIN REGULAR HUMAN PER 5 UNITS: Performed by: ANESTHESIOLOGY

## 2018-07-19 PROCEDURE — B246ZZ4 ULTRASONOGRAPHY OF RIGHT AND LEFT HEART, TRANSESOPHAGEAL: ICD-10-PCS | Performed by: THORACIC SURGERY (CARDIOTHORACIC VASCULAR SURGERY)

## 2018-07-19 PROCEDURE — C1729 CATH, DRAINAGE: HCPCS | Performed by: THORACIC SURGERY (CARDIOTHORACIC VASCULAR SURGERY)

## 2018-07-19 PROCEDURE — 82962 GLUCOSE BLOOD TEST: CPT

## 2018-07-19 PROCEDURE — 86923 COMPATIBILITY TEST ELECTRIC: CPT

## 2018-07-19 PROCEDURE — 93010 ELECTROCARDIOGRAM REPORT: CPT | Performed by: INTERNAL MEDICINE

## 2018-07-19 PROCEDURE — 85018 HEMOGLOBIN: CPT

## 2018-07-19 PROCEDURE — 25010000002 HEPARIN (PORCINE) PER 1000 UNITS: Performed by: THORACIC SURGERY (CARDIOTHORACIC VASCULAR SURGERY)

## 2018-07-19 PROCEDURE — 25010000002 PHENYLEPHRINE PER 1 ML: Performed by: ANESTHESIOLOGY

## 2018-07-19 PROCEDURE — 25010000002 PROPOFOL 10 MG/ML EMULSION: Performed by: ANESTHESIOLOGY

## 2018-07-19 PROCEDURE — 93318 ECHO TRANSESOPHAGEAL INTRAOP: CPT | Performed by: ANESTHESIOLOGY

## 2018-07-19 PROCEDURE — 03HY32Z INSERTION OF MONITORING DEVICE INTO UPPER ARTERY, PERCUTANEOUS APPROACH: ICD-10-PCS | Performed by: ANESTHESIOLOGY

## 2018-07-19 PROCEDURE — 25010000002 AMIODARONE PER 30 MG: Performed by: ANESTHESIOLOGY

## 2018-07-19 PROCEDURE — 82803 BLOOD GASES ANY COMBINATION: CPT

## 2018-07-19 PROCEDURE — 82947 ASSAY GLUCOSE BLOOD QUANT: CPT

## 2018-07-19 PROCEDURE — P9046 ALBUMIN (HUMAN), 25%, 20 ML: HCPCS

## 2018-07-19 PROCEDURE — A4648 IMPLANTABLE TISSUE MARKER: HCPCS | Performed by: THORACIC SURGERY (CARDIOTHORACIC VASCULAR SURGERY)

## 2018-07-19 PROCEDURE — 33519 CABG ARTERY-VEIN THREE: CPT | Performed by: THORACIC SURGERY (CARDIOTHORACIC VASCULAR SURGERY)

## 2018-07-19 PROCEDURE — 71045 X-RAY EXAM CHEST 1 VIEW: CPT

## 2018-07-19 PROCEDURE — 83735 ASSAY OF MAGNESIUM: CPT | Performed by: THORACIC SURGERY (CARDIOTHORACIC VASCULAR SURGERY)

## 2018-07-19 PROCEDURE — 80069 RENAL FUNCTION PANEL: CPT | Performed by: THORACIC SURGERY (CARDIOTHORACIC VASCULAR SURGERY)

## 2018-07-19 PROCEDURE — 85384 FIBRINOGEN ACTIVITY: CPT | Performed by: THORACIC SURGERY (CARDIOTHORACIC VASCULAR SURGERY)

## 2018-07-19 PROCEDURE — 93005 ELECTROCARDIOGRAM TRACING: CPT | Performed by: THORACIC SURGERY (CARDIOTHORACIC VASCULAR SURGERY)

## 2018-07-19 PROCEDURE — 33508 ENDOSCOPIC VEIN HARVEST: CPT | Performed by: THORACIC SURGERY (CARDIOTHORACIC VASCULAR SURGERY)

## 2018-07-19 PROCEDURE — 33533 CABG ARTERIAL SINGLE: CPT | Performed by: THORACIC SURGERY (CARDIOTHORACIC VASCULAR SURGERY)

## 2018-07-19 PROCEDURE — 25010000003 CEFAZOLIN IN DEXTROSE 2-4 GM/100ML-% SOLUTION: Performed by: NURSE PRACTITIONER

## 2018-07-19 PROCEDURE — 85014 HEMATOCRIT: CPT

## 2018-07-19 PROCEDURE — 06BP4ZZ EXCISION OF RIGHT SAPHENOUS VEIN, PERCUTANEOUS ENDOSCOPIC APPROACH: ICD-10-PCS | Performed by: THORACIC SURGERY (CARDIOTHORACIC VASCULAR SURGERY)

## 2018-07-19 PROCEDURE — 25010000002 MAGNESIUM SULFATE PER 500 MG OF MAGNESIUM: Performed by: ANESTHESIOLOGY

## 2018-07-19 PROCEDURE — 85347 COAGULATION TIME ACTIVATED: CPT

## 2018-07-19 PROCEDURE — 86901 BLOOD TYPING SEROLOGIC RH(D): CPT | Performed by: THORACIC SURGERY (CARDIOTHORACIC VASCULAR SURGERY)

## 2018-07-19 PROCEDURE — 25010000003 CEFAZOLIN IN DEXTROSE 2-4 GM/100ML-% SOLUTION: Performed by: THORACIC SURGERY (CARDIOTHORACIC VASCULAR SURGERY)

## 2018-07-19 PROCEDURE — 25010000002 PAPAVERINE PER 60 MG: Performed by: THORACIC SURGERY (CARDIOTHORACIC VASCULAR SURGERY)

## 2018-07-19 PROCEDURE — 3E080GC INTRODUCTION OF OTHER THERAPEUTIC SUBSTANCE INTO HEART, OPEN APPROACH: ICD-10-PCS | Performed by: THORACIC SURGERY (CARDIOTHORACIC VASCULAR SURGERY)

## 2018-07-19 PROCEDURE — 86900 BLOOD TYPING SEROLOGIC ABO: CPT | Performed by: THORACIC SURGERY (CARDIOTHORACIC VASCULAR SURGERY)

## 2018-07-19 PROCEDURE — 5A1221Z PERFORMANCE OF CARDIAC OUTPUT, CONTINUOUS: ICD-10-PCS | Performed by: THORACIC SURGERY (CARDIOTHORACIC VASCULAR SURGERY)

## 2018-07-19 PROCEDURE — 94799 UNLISTED PULMONARY SVC/PX: CPT

## 2018-07-19 PROCEDURE — 84132 ASSAY OF SERUM POTASSIUM: CPT | Performed by: THORACIC SURGERY (CARDIOTHORACIC VASCULAR SURGERY)

## 2018-07-19 PROCEDURE — 25010000002 FUROSEMIDE PER 20 MG

## 2018-07-19 PROCEDURE — 02100A9 BYPASS CORONARY ARTERY, ONE ARTERY FROM LEFT INTERNAL MAMMARY WITH AUTOLOGOUS ARTERIAL TISSUE, OPEN APPROACH: ICD-10-PCS | Performed by: THORACIC SURGERY (CARDIOTHORACIC VASCULAR SURGERY)

## 2018-07-19 PROCEDURE — 25010000002 MORPHINE SULFATE (PF) 2 MG/ML SOLUTION: Performed by: THORACIC SURGERY (CARDIOTHORACIC VASCULAR SURGERY)

## 2018-07-19 PROCEDURE — 82330 ASSAY OF CALCIUM: CPT | Performed by: THORACIC SURGERY (CARDIOTHORACIC VASCULAR SURGERY)

## 2018-07-19 PROCEDURE — 0212093 BYPASS CORONARY ARTERY, THREE ARTERIES FROM CORONARY ARTERY WITH AUTOLOGOUS VENOUS TISSUE, OPEN APPROACH: ICD-10-PCS | Performed by: THORACIC SURGERY (CARDIOTHORACIC VASCULAR SURGERY)

## 2018-07-19 PROCEDURE — 85610 PROTHROMBIN TIME: CPT | Performed by: THORACIC SURGERY (CARDIOTHORACIC VASCULAR SURGERY)

## 2018-07-19 PROCEDURE — 25010000002 ALBUMIN HUMAN 25% PER 50 ML

## 2018-07-19 PROCEDURE — 05HM33Z INSERTION OF INFUSION DEVICE INTO RIGHT INTERNAL JUGULAR VEIN, PERCUTANEOUS APPROACH: ICD-10-PCS | Performed by: ANESTHESIOLOGY

## 2018-07-19 PROCEDURE — 25010000002 HEPARIN (PORCINE) PER 1000 UNITS

## 2018-07-19 PROCEDURE — 25010000003 POTASSIUM CHLORIDE PER 2 MEQ: Performed by: THORACIC SURGERY (CARDIOTHORACIC VASCULAR SURGERY)

## 2018-07-19 PROCEDURE — 86850 RBC ANTIBODY SCREEN: CPT | Performed by: THORACIC SURGERY (CARDIOTHORACIC VASCULAR SURGERY)

## 2018-07-19 PROCEDURE — 25010000002 HEPARIN (PORCINE) PER 1000 UNITS: Performed by: ANESTHESIOLOGY

## 2018-07-19 PROCEDURE — 25010000002 FENTANYL CITRATE (PF) 100 MCG/2ML SOLUTION: Performed by: ANESTHESIOLOGY

## 2018-07-19 PROCEDURE — 85025 COMPLETE CBC W/AUTO DIFF WBC: CPT | Performed by: THORACIC SURGERY (CARDIOTHORACIC VASCULAR SURGERY)

## 2018-07-19 DEVICE — RNG MITRAL PHYSIOII MDL 5200 26MM: Type: IMPLANTABLE DEVICE | Site: HEART | Status: FUNCTIONAL

## 2018-07-19 RX ORDER — SODIUM CHLORIDE 0.9 % (FLUSH) 0.9 %
1-10 SYRINGE (ML) INJECTION AS NEEDED
Status: DISCONTINUED | OUTPATIENT
Start: 2018-07-19 | End: 2018-07-19 | Stop reason: HOSPADM

## 2018-07-19 RX ORDER — TOPIRAMATE 100 MG/1
300 TABLET, FILM COATED ORAL NIGHTLY
Status: DISCONTINUED | OUTPATIENT
Start: 2018-07-19 | End: 2018-07-24 | Stop reason: HOSPADM

## 2018-07-19 RX ORDER — OXYCODONE HYDROCHLORIDE 5 MG/1
10 TABLET ORAL EVERY 4 HOURS PRN
Status: DISCONTINUED | OUTPATIENT
Start: 2018-07-19 | End: 2018-07-24 | Stop reason: HOSPADM

## 2018-07-19 RX ORDER — POTASSIUM CHLORIDE 750 MG/1
40 CAPSULE, EXTENDED RELEASE ORAL AS NEEDED
Status: DISCONTINUED | OUTPATIENT
Start: 2018-07-19 | End: 2018-07-24 | Stop reason: HOSPADM

## 2018-07-19 RX ORDER — ROCURONIUM BROMIDE 10 MG/ML
INJECTION, SOLUTION INTRAVENOUS AS NEEDED
Status: DISCONTINUED | OUTPATIENT
Start: 2018-07-19 | End: 2018-07-19 | Stop reason: SURG

## 2018-07-19 RX ORDER — PROPOFOL 10 MG/ML
VIAL (ML) INTRAVENOUS CONTINUOUS PRN
Status: DISCONTINUED | OUTPATIENT
Start: 2018-07-19 | End: 2018-07-19 | Stop reason: SURG

## 2018-07-19 RX ORDER — PROMETHAZINE HYDROCHLORIDE 25 MG/1
12.5 TABLET ORAL EVERY 6 HOURS PRN
Status: DISCONTINUED | OUTPATIENT
Start: 2018-07-19 | End: 2018-07-24 | Stop reason: HOSPADM

## 2018-07-19 RX ORDER — CHLORHEXIDINE GLUCONATE 0.12 MG/ML
15 RINSE ORAL EVERY 12 HOURS
Status: DISCONTINUED | OUTPATIENT
Start: 2018-07-19 | End: 2018-07-24 | Stop reason: HOSPADM

## 2018-07-19 RX ORDER — SODIUM CHLORIDE, SODIUM LACTATE, POTASSIUM CHLORIDE, CALCIUM CHLORIDE 600; 310; 30; 20 MG/100ML; MG/100ML; MG/100ML; MG/100ML
9 INJECTION, SOLUTION INTRAVENOUS CONTINUOUS
Status: DISCONTINUED | OUTPATIENT
Start: 2018-07-19 | End: 2018-07-19

## 2018-07-19 RX ORDER — FUROSEMIDE 10 MG/ML
40 INJECTION INTRAMUSCULAR; INTRAVENOUS EVERY 6 HOURS PRN
Status: DISCONTINUED | OUTPATIENT
Start: 2018-07-19 | End: 2018-07-20

## 2018-07-19 RX ORDER — ALPRAZOLAM 0.25 MG/1
0.25 TABLET ORAL EVERY 8 HOURS PRN
Status: DISCONTINUED | OUTPATIENT
Start: 2018-07-19 | End: 2018-07-24 | Stop reason: HOSPADM

## 2018-07-19 RX ORDER — PROPOFOL 10 MG/ML
VIAL (ML) INTRAVENOUS AS NEEDED
Status: DISCONTINUED | OUTPATIENT
Start: 2018-07-19 | End: 2018-07-19 | Stop reason: SURG

## 2018-07-19 RX ORDER — MAGNESIUM SULFATE HEPTAHYDRATE 40 MG/ML
2 INJECTION, SOLUTION INTRAVENOUS AS NEEDED
Status: DISCONTINUED | OUTPATIENT
Start: 2018-07-19 | End: 2018-07-24 | Stop reason: HOSPADM

## 2018-07-19 RX ORDER — POTASSIUM CHLORIDE 29.8 MG/ML
20 INJECTION INTRAVENOUS
Status: DISCONTINUED | OUTPATIENT
Start: 2018-07-19 | End: 2018-07-24 | Stop reason: HOSPADM

## 2018-07-19 RX ORDER — BISACODYL 5 MG/1
10 TABLET, DELAYED RELEASE ORAL DAILY PRN
Status: DISCONTINUED | OUTPATIENT
Start: 2018-07-19 | End: 2018-07-24 | Stop reason: HOSPADM

## 2018-07-19 RX ORDER — FENTANYL CITRATE 50 UG/ML
INJECTION, SOLUTION INTRAMUSCULAR; INTRAVENOUS AS NEEDED
Status: DISCONTINUED | OUTPATIENT
Start: 2018-07-19 | End: 2018-07-19 | Stop reason: SURG

## 2018-07-19 RX ORDER — PROTAMINE SULFATE 10 MG/ML
INJECTION, SOLUTION INTRAVENOUS AS NEEDED
Status: DISCONTINUED | OUTPATIENT
Start: 2018-07-19 | End: 2018-07-19 | Stop reason: SURG

## 2018-07-19 RX ORDER — TRANEXAMIC ACID 100 MG/ML
INJECTION, SOLUTION INTRAVENOUS AS NEEDED
Status: DISCONTINUED | OUTPATIENT
Start: 2018-07-19 | End: 2018-07-19 | Stop reason: SURG

## 2018-07-19 RX ORDER — AMIODARONE HYDROCHLORIDE 50 MG/ML
INJECTION, SOLUTION INTRAVENOUS AS NEEDED
Status: DISCONTINUED | OUTPATIENT
Start: 2018-07-19 | End: 2018-07-19 | Stop reason: SURG

## 2018-07-19 RX ORDER — CEFAZOLIN SODIUM 2 G/100ML
2 INJECTION, SOLUTION INTRAVENOUS ONCE
Status: COMPLETED | OUTPATIENT
Start: 2018-07-19 | End: 2018-07-19

## 2018-07-19 RX ORDER — ACETAMINOPHEN 325 MG/1
650 TABLET ORAL EVERY 4 HOURS PRN
Status: DISCONTINUED | OUTPATIENT
Start: 2018-07-19 | End: 2018-07-24 | Stop reason: HOSPADM

## 2018-07-19 RX ORDER — ASPIRIN 81 MG/1
81 TABLET ORAL DAILY
Status: DISCONTINUED | OUTPATIENT
Start: 2018-07-20 | End: 2018-07-24 | Stop reason: HOSPADM

## 2018-07-19 RX ORDER — SODIUM CHLORIDE 9 MG/ML
INJECTION, SOLUTION INTRAVENOUS CONTINUOUS PRN
Status: DISCONTINUED | OUTPATIENT
Start: 2018-07-19 | End: 2018-07-19 | Stop reason: SURG

## 2018-07-19 RX ORDER — MAGNESIUM SULFATE 1 G/100ML
1 INJECTION INTRAVENOUS AS NEEDED
Status: DISCONTINUED | OUTPATIENT
Start: 2018-07-19 | End: 2018-07-24 | Stop reason: HOSPADM

## 2018-07-19 RX ORDER — SODIUM CHLORIDE 9 MG/ML
30 INJECTION, SOLUTION INTRAVENOUS CONTINUOUS PRN
Status: DISCONTINUED | OUTPATIENT
Start: 2018-07-19 | End: 2018-07-20

## 2018-07-19 RX ORDER — SODIUM CHLORIDE 9 MG/ML
30 INJECTION, SOLUTION INTRAVENOUS CONTINUOUS
Status: DISCONTINUED | OUTPATIENT
Start: 2018-07-19 | End: 2018-07-20

## 2018-07-19 RX ORDER — FAMOTIDINE 10 MG/ML
20 INJECTION, SOLUTION INTRAVENOUS ONCE
Status: COMPLETED | OUTPATIENT
Start: 2018-07-19 | End: 2018-07-19

## 2018-07-19 RX ORDER — MIDAZOLAM HYDROCHLORIDE 1 MG/ML
2 INJECTION INTRAMUSCULAR; INTRAVENOUS
Status: DISCONTINUED | OUTPATIENT
Start: 2018-07-19 | End: 2018-07-19 | Stop reason: HOSPADM

## 2018-07-19 RX ORDER — BISACODYL 10 MG
10 SUPPOSITORY, RECTAL RECTAL DAILY PRN
Status: DISCONTINUED | OUTPATIENT
Start: 2018-07-20 | End: 2018-07-24 | Stop reason: HOSPADM

## 2018-07-19 RX ORDER — ALBUMIN, HUMAN INJ 5% 5 %
1500 SOLUTION INTRAVENOUS AS NEEDED
Status: DISCONTINUED | OUTPATIENT
Start: 2018-07-19 | End: 2018-07-20

## 2018-07-19 RX ORDER — ACETAMINOPHEN 650 MG/1
650 SUPPOSITORY RECTAL EVERY 4 HOURS PRN
Status: DISCONTINUED | OUTPATIENT
Start: 2018-07-19 | End: 2018-07-24 | Stop reason: HOSPADM

## 2018-07-19 RX ORDER — ONDANSETRON 2 MG/ML
4 INJECTION INTRAMUSCULAR; INTRAVENOUS EVERY 6 HOURS PRN
Status: DISCONTINUED | OUTPATIENT
Start: 2018-07-19 | End: 2018-07-24 | Stop reason: HOSPADM

## 2018-07-19 RX ORDER — MEPERIDINE HYDROCHLORIDE 25 MG/ML
25 INJECTION INTRAMUSCULAR; INTRAVENOUS; SUBCUTANEOUS EVERY 4 HOURS PRN
Status: ACTIVE | OUTPATIENT
Start: 2018-07-19 | End: 2018-07-19

## 2018-07-19 RX ORDER — METOCLOPRAMIDE HYDROCHLORIDE 5 MG/ML
10 INJECTION INTRAMUSCULAR; INTRAVENOUS EVERY 6 HOURS
Status: COMPLETED | OUTPATIENT
Start: 2018-07-19 | End: 2018-07-20

## 2018-07-19 RX ORDER — HEPARIN SODIUM 5000 [USP'U]/ML
INJECTION, SOLUTION INTRAVENOUS; SUBCUTANEOUS AS NEEDED
Status: DISCONTINUED | OUTPATIENT
Start: 2018-07-19 | End: 2018-07-19 | Stop reason: HOSPADM

## 2018-07-19 RX ORDER — MAGNESIUM SULFATE HEPTAHYDRATE 500 MG/ML
INJECTION, SOLUTION INTRAMUSCULAR; INTRAVENOUS AS NEEDED
Status: DISCONTINUED | OUTPATIENT
Start: 2018-07-19 | End: 2018-07-19 | Stop reason: SURG

## 2018-07-19 RX ORDER — MIDAZOLAM HYDROCHLORIDE 1 MG/ML
1 INJECTION INTRAMUSCULAR; INTRAVENOUS
Status: DISCONTINUED | OUTPATIENT
Start: 2018-07-19 | End: 2018-07-19 | Stop reason: HOSPADM

## 2018-07-19 RX ORDER — CEFAZOLIN SODIUM 2 G/100ML
2 INJECTION, SOLUTION INTRAVENOUS EVERY 8 HOURS
Status: DISCONTINUED | OUTPATIENT
Start: 2018-07-19 | End: 2018-07-21 | Stop reason: SDUPTHER

## 2018-07-19 RX ORDER — HYDROCODONE BITARTRATE AND ACETAMINOPHEN 5; 325 MG/1; MG/1
2 TABLET ORAL EVERY 4 HOURS PRN
Status: DISCONTINUED | OUTPATIENT
Start: 2018-07-19 | End: 2018-07-24 | Stop reason: HOSPADM

## 2018-07-19 RX ORDER — PAPAVERINE HYDROCHLORIDE 30 MG/ML
INJECTION INTRAMUSCULAR; INTRAVENOUS AS NEEDED
Status: DISCONTINUED | OUTPATIENT
Start: 2018-07-19 | End: 2018-07-19 | Stop reason: HOSPADM

## 2018-07-19 RX ORDER — MILRINONE LACTATE 0.2 MG/ML
.25-.75 INJECTION, SOLUTION INTRAVENOUS CONTINUOUS PRN
Status: DISCONTINUED | OUTPATIENT
Start: 2018-07-19 | End: 2018-07-20

## 2018-07-19 RX ORDER — MIDAZOLAM HYDROCHLORIDE 1 MG/ML
INJECTION INTRAMUSCULAR; INTRAVENOUS AS NEEDED
Status: DISCONTINUED | OUTPATIENT
Start: 2018-07-19 | End: 2018-07-19 | Stop reason: SURG

## 2018-07-19 RX ORDER — PROMETHAZINE HYDROCHLORIDE 25 MG/ML
12.5 INJECTION, SOLUTION INTRAMUSCULAR; INTRAVENOUS EVERY 6 HOURS PRN
Status: DISCONTINUED | OUTPATIENT
Start: 2018-07-19 | End: 2018-07-24 | Stop reason: HOSPADM

## 2018-07-19 RX ORDER — POTASSIUM CHLORIDE 7.45 MG/ML
10 INJECTION INTRAVENOUS
Status: DISCONTINUED | OUTPATIENT
Start: 2018-07-19 | End: 2018-07-24 | Stop reason: HOSPADM

## 2018-07-19 RX ORDER — LIDOCAINE HYDROCHLORIDE 10 MG/ML
0.5 INJECTION, SOLUTION EPIDURAL; INFILTRATION; INTRACAUDAL; PERINEURAL ONCE AS NEEDED
Status: DISCONTINUED | OUTPATIENT
Start: 2018-07-19 | End: 2018-07-19 | Stop reason: HOSPADM

## 2018-07-19 RX ORDER — HEPARIN SODIUM 1000 [USP'U]/ML
INJECTION, SOLUTION INTRAVENOUS; SUBCUTANEOUS AS NEEDED
Status: DISCONTINUED | OUTPATIENT
Start: 2018-07-19 | End: 2018-07-19 | Stop reason: SURG

## 2018-07-19 RX ORDER — PANTOPRAZOLE SODIUM 40 MG/1
40 TABLET, DELAYED RELEASE ORAL DAILY
Status: DISCONTINUED | OUTPATIENT
Start: 2018-07-20 | End: 2018-07-24 | Stop reason: HOSPADM

## 2018-07-19 RX ORDER — MIDAZOLAM HYDROCHLORIDE 1 MG/ML
2 INJECTION INTRAMUSCULAR; INTRAVENOUS
Status: DISCONTINUED | OUTPATIENT
Start: 2018-07-19 | End: 2018-07-20

## 2018-07-19 RX ORDER — DOPAMINE HYDROCHLORIDE 160 MG/100ML
2-20 INJECTION, SOLUTION INTRAVENOUS CONTINUOUS PRN
Status: DISCONTINUED | OUTPATIENT
Start: 2018-07-19 | End: 2018-07-20

## 2018-07-19 RX ORDER — MAGNESIUM SULFATE 1 G/100ML
1 INJECTION INTRAVENOUS EVERY 8 HOURS
Status: DISPENSED | OUTPATIENT
Start: 2018-07-19 | End: 2018-07-20

## 2018-07-19 RX ORDER — SENNA AND DOCUSATE SODIUM 50; 8.6 MG/1; MG/1
2 TABLET, FILM COATED ORAL NIGHTLY
Status: DISCONTINUED | OUTPATIENT
Start: 2018-07-20 | End: 2018-07-24 | Stop reason: HOSPADM

## 2018-07-19 RX ORDER — MORPHINE SULFATE 2 MG/ML
1 INJECTION, SOLUTION INTRAMUSCULAR; INTRAVENOUS EVERY 4 HOURS PRN
Status: DISCONTINUED | OUTPATIENT
Start: 2018-07-19 | End: 2018-07-24 | Stop reason: HOSPADM

## 2018-07-19 RX ORDER — MORPHINE SULFATE 2 MG/ML
4 INJECTION, SOLUTION INTRAMUSCULAR; INTRAVENOUS
Status: DISCONTINUED | OUTPATIENT
Start: 2018-07-19 | End: 2018-07-20

## 2018-07-19 RX ORDER — GABAPENTIN 300 MG/1
600 CAPSULE ORAL EVERY 8 HOURS SCHEDULED
Status: DISCONTINUED | OUTPATIENT
Start: 2018-07-19 | End: 2018-07-21

## 2018-07-19 RX ORDER — CYCLOBENZAPRINE HCL 10 MG
10 TABLET ORAL EVERY 8 HOURS PRN
Status: DISCONTINUED | OUTPATIENT
Start: 2018-07-20 | End: 2018-07-24 | Stop reason: HOSPADM

## 2018-07-19 RX ORDER — ATORVASTATIN CALCIUM 20 MG/1
40 TABLET, FILM COATED ORAL NIGHTLY
Status: DISCONTINUED | OUTPATIENT
Start: 2018-07-19 | End: 2018-07-19

## 2018-07-19 RX ORDER — CHLORHEXIDINE GLUCONATE 500 MG/1
1 CLOTH TOPICAL EVERY 12 HOURS PRN
Status: DISCONTINUED | OUTPATIENT
Start: 2018-07-19 | End: 2018-07-19 | Stop reason: HOSPADM

## 2018-07-19 RX ORDER — KETAMINE HYDROCHLORIDE 10 MG/ML
INJECTION INTRAMUSCULAR; INTRAVENOUS AS NEEDED
Status: DISCONTINUED | OUTPATIENT
Start: 2018-07-19 | End: 2018-07-19 | Stop reason: SURG

## 2018-07-19 RX ORDER — NALOXONE HCL 0.4 MG/ML
0.4 VIAL (ML) INJECTION
Status: DISCONTINUED | OUTPATIENT
Start: 2018-07-19 | End: 2018-07-24 | Stop reason: HOSPADM

## 2018-07-19 RX ORDER — CHLORHEXIDINE GLUCONATE 500 MG/1
1 CLOTH TOPICAL EVERY 12 HOURS PRN
Status: DISCONTINUED | OUTPATIENT
Start: 2018-07-19 | End: 2018-07-19

## 2018-07-19 RX ORDER — NITROGLYCERIN 20 MG/100ML
5-200 INJECTION INTRAVENOUS
Status: DISCONTINUED | OUTPATIENT
Start: 2018-07-19 | End: 2018-07-20

## 2018-07-19 RX ORDER — POTASSIUM CHLORIDE 1.5 G/1.77G
40 POWDER, FOR SOLUTION ORAL AS NEEDED
Status: DISCONTINUED | OUTPATIENT
Start: 2018-07-19 | End: 2018-07-24 | Stop reason: HOSPADM

## 2018-07-19 RX ORDER — FENTANYL CITRATE 50 UG/ML
50 INJECTION, SOLUTION INTRAMUSCULAR; INTRAVENOUS
Status: DISCONTINUED | OUTPATIENT
Start: 2018-07-19 | End: 2018-07-19 | Stop reason: HOSPADM

## 2018-07-19 RX ORDER — SODIUM CHLORIDE 0.9 % (FLUSH) 0.9 %
30 SYRINGE (ML) INJECTION ONCE AS NEEDED
Status: DISCONTINUED | OUTPATIENT
Start: 2018-07-19 | End: 2018-07-24 | Stop reason: HOSPADM

## 2018-07-19 RX ORDER — CHLORHEXIDINE GLUCONATE 0.12 MG/ML
15 RINSE ORAL ONCE
Status: DISCONTINUED | OUTPATIENT
Start: 2018-07-19 | End: 2018-07-19 | Stop reason: HOSPADM

## 2018-07-19 RX ORDER — CLONAZEPAM 1 MG/1
1 TABLET ORAL 4 TIMES DAILY PRN
Status: DISCONTINUED | OUTPATIENT
Start: 2018-07-19 | End: 2018-07-24 | Stop reason: HOSPADM

## 2018-07-19 RX ADMIN — PROPOFOL 50 MCG/KG/MIN: 10 INJECTION, EMULSION INTRAVENOUS at 08:35

## 2018-07-19 RX ADMIN — HEPARIN SODIUM 30000 UNITS: 1000 INJECTION, SOLUTION INTRAVENOUS; SUBCUTANEOUS at 08:11

## 2018-07-19 RX ADMIN — SODIUM CHLORIDE: 9 INJECTION, SOLUTION INTRAVENOUS at 08:03

## 2018-07-19 RX ADMIN — TRANEXAMIC ACID 200 MG: 100 INJECTION, SOLUTION INTRAVENOUS at 09:44

## 2018-07-19 RX ADMIN — TRANEXAMIC ACID 200 MG: 100 INJECTION, SOLUTION INTRAVENOUS at 07:40

## 2018-07-19 RX ADMIN — ALBUMIN HUMAN 500 ML: 0.05 INJECTION, SOLUTION INTRAVENOUS at 13:22

## 2018-07-19 RX ADMIN — FENTANYL CITRATE 150 MCG: 50 INJECTION, SOLUTION INTRAMUSCULAR; INTRAVENOUS at 07:41

## 2018-07-19 RX ADMIN — SODIUM CHLORIDE 30 ML/HR: 9 INJECTION, SOLUTION INTRAVENOUS at 12:29

## 2018-07-19 RX ADMIN — FENTANYL CITRATE 250 MCG: 50 INJECTION, SOLUTION INTRAMUSCULAR; INTRAVENOUS at 07:40

## 2018-07-19 RX ADMIN — PROTAMINE SULFATE 350 MG: 10 INJECTION, SOLUTION INTRAVENOUS at 10:29

## 2018-07-19 RX ADMIN — SODIUM CHLORIDE: 9 INJECTION, SOLUTION INTRAVENOUS at 07:25

## 2018-07-19 RX ADMIN — TRANEXAMIC ACID 200 MG: 100 INJECTION, SOLUTION INTRAVENOUS at 10:41

## 2018-07-19 RX ADMIN — KETAMINE HYDROCHLORIDE 20 MG: 10 INJECTION INTRAMUSCULAR; INTRAVENOUS at 07:40

## 2018-07-19 RX ADMIN — PROPOFOL 20 MG: 10 INJECTION, EMULSION INTRAVENOUS at 06:48

## 2018-07-19 RX ADMIN — PHENYLEPHRINE HYDROCHLORIDE 0.2 MCG/KG/MIN: 10 INJECTION, SOLUTION INTRAMUSCULAR; INTRAVENOUS; SUBCUTANEOUS at 10:37

## 2018-07-19 RX ADMIN — CEFAZOLIN SODIUM 2 G: 2 INJECTION, SOLUTION INTRAVENOUS at 07:37

## 2018-07-19 RX ADMIN — TRANEXAMIC ACID 200 MG: 100 INJECTION, SOLUTION INTRAVENOUS at 07:38

## 2018-07-19 RX ADMIN — ROCURONIUM BROMIDE 30 MG: 10 INJECTION INTRAVENOUS at 09:23

## 2018-07-19 RX ADMIN — POTASSIUM CHLORIDE 20 MEQ: 400 INJECTION, SOLUTION INTRAVENOUS at 12:38

## 2018-07-19 RX ADMIN — QUETIAPINE FUMARATE 300 MG: 200 TABLET, FILM COATED ORAL at 20:48

## 2018-07-19 RX ADMIN — EPINEPHRINE 0.01 MCG/KG/MIN: 1 INJECTION, SOLUTION, CONCENTRATE INTRAVENOUS at 10:17

## 2018-07-19 RX ADMIN — SODIUM CHLORIDE, POTASSIUM CHLORIDE, SODIUM LACTATE AND CALCIUM CHLORIDE 9 ML/HR: 600; 310; 30; 20 INJECTION, SOLUTION INTRAVENOUS at 06:03

## 2018-07-19 RX ADMIN — ROCURONIUM BROMIDE 20 MG: 10 INJECTION INTRAVENOUS at 07:40

## 2018-07-19 RX ADMIN — CEFAZOLIN SODIUM 2 G: 2 INJECTION, SOLUTION INTRAVENOUS at 10:56

## 2018-07-19 RX ADMIN — Medication 2 MG: at 06:47

## 2018-07-19 RX ADMIN — MAGNESIUM SULFATE HEPTAHYDRATE 2 G: 500 INJECTION, SOLUTION INTRAMUSCULAR; INTRAVENOUS at 09:43

## 2018-07-19 RX ADMIN — TRANEXAMIC ACID 200 MG: 100 INJECTION, SOLUTION INTRAVENOUS at 07:27

## 2018-07-19 RX ADMIN — SODIUM CHLORIDE 3.4 UNITS/HR: 900 INJECTION, SOLUTION INTRAVENOUS at 08:24

## 2018-07-19 RX ADMIN — TRANEXAMIC ACID 400 MG: 100 INJECTION, SOLUTION INTRAVENOUS at 07:32

## 2018-07-19 RX ADMIN — TRANEXAMIC ACID 200 MG: 100 INJECTION, SOLUTION INTRAVENOUS at 08:49

## 2018-07-19 RX ADMIN — OXYCODONE HYDROCHLORIDE 10 MG: 5 TABLET ORAL at 15:30

## 2018-07-19 RX ADMIN — METOPROLOL TARTRATE 12.5 MG: 25 TABLET ORAL at 20:43

## 2018-07-19 RX ADMIN — METOPROLOL TARTRATE 12.5 MG: 25 TABLET ORAL at 05:59

## 2018-07-19 RX ADMIN — GABAPENTIN 600 MG: 300 CAPSULE ORAL at 21:10

## 2018-07-19 RX ADMIN — PROPOFOL 50 MG: 10 INJECTION, EMULSION INTRAVENOUS at 06:55

## 2018-07-19 RX ADMIN — SODIUM CHLORIDE: 9 INJECTION, SOLUTION INTRAVENOUS at 06:42

## 2018-07-19 RX ADMIN — GABAPENTIN 600 MG: 300 CAPSULE ORAL at 16:43

## 2018-07-19 RX ADMIN — METOCLOPRAMIDE 10 MG: 5 INJECTION, SOLUTION INTRAMUSCULAR; INTRAVENOUS at 12:34

## 2018-07-19 RX ADMIN — TOPIRAMATE 300 MG: 100 TABLET, FILM COATED ORAL at 20:49

## 2018-07-19 RX ADMIN — CLONAZEPAM 1 MG: 1 TABLET ORAL at 16:43

## 2018-07-19 RX ADMIN — FAMOTIDINE 20 MG: 10 INJECTION, SOLUTION INTRAVENOUS at 06:01

## 2018-07-19 RX ADMIN — AMIODARONE HYDROCHLORIDE 150 MG: 50 INJECTION, SOLUTION INTRAVENOUS at 10:07

## 2018-07-19 RX ADMIN — FENTANYL CITRATE 150 MCG: 50 INJECTION, SOLUTION INTRAMUSCULAR; INTRAVENOUS at 06:55

## 2018-07-19 RX ADMIN — MIDAZOLAM 1 MG: 1 INJECTION INTRAMUSCULAR; INTRAVENOUS at 06:15

## 2018-07-19 RX ADMIN — METOCLOPRAMIDE 10 MG: 5 INJECTION, SOLUTION INTRAMUSCULAR; INTRAVENOUS at 18:40

## 2018-07-19 RX ADMIN — ROCURONIUM BROMIDE 50 MG: 10 INJECTION INTRAVENOUS at 06:55

## 2018-07-19 RX ADMIN — MUPIROCIN: 20 OINTMENT TOPICAL at 13:20

## 2018-07-19 RX ADMIN — CEFAZOLIN SODIUM 2 G: 2 INJECTION, SOLUTION INTRAVENOUS at 18:53

## 2018-07-19 RX ADMIN — MAGNESIUM SULFATE HEPTAHYDRATE 1 G: 1 INJECTION, SOLUTION INTRAVENOUS at 20:42

## 2018-07-19 RX ADMIN — MUPIROCIN 10 APPLICATION: 20 OINTMENT TOPICAL at 20:42

## 2018-07-19 RX ADMIN — FENTANYL CITRATE 100 MCG: 50 INJECTION, SOLUTION INTRAMUSCULAR; INTRAVENOUS at 07:42

## 2018-07-19 RX ADMIN — ROCURONIUM BROMIDE 30 MG: 10 INJECTION INTRAVENOUS at 08:37

## 2018-07-19 RX ADMIN — MIDAZOLAM 1 MG: 1 INJECTION INTRAMUSCULAR; INTRAVENOUS at 06:00

## 2018-07-19 RX ADMIN — FENTANYL CITRATE 50 MCG: 50 INJECTION, SOLUTION INTRAMUSCULAR; INTRAVENOUS at 06:49

## 2018-07-19 RX ADMIN — MORPHINE SULFATE 4 MG: 2 INJECTION, SOLUTION INTRAMUSCULAR; INTRAVENOUS at 12:34

## 2018-07-19 RX ADMIN — PROPOFOL 20 MG: 10 INJECTION, EMULSION INTRAVENOUS at 06:49

## 2018-07-19 RX ADMIN — SODIUM CHLORIDE 0.25 MCG/KG/MIN: 0.9 INJECTION, SOLUTION INTRAVENOUS at 09:51

## 2018-07-19 RX ADMIN — FENTANYL CITRATE 50 MCG: 50 INJECTION, SOLUTION INTRAMUSCULAR; INTRAVENOUS at 06:47

## 2018-07-19 RX ADMIN — BUPROPION HYDROCHLORIDE 450 MG: 150 TABLET, EXTENDED RELEASE ORAL at 20:48

## 2018-07-19 NOTE — ANESTHESIA POSTPROCEDURE EVALUATION
"Patient: Angeles Oquendo    Procedure Summary     Date:  07/19/18 Room / Location:  [facility] OR [address] [place] [address] MAIN OR    Anesthesia Start:  0642 Anesthesia Stop:  1145    Procedure:  LAURO STERNOTOMY CORONARY ARTERY BYPASS GRAFT TIMES 4 USING LEFT INTERNAL MAMMARY ARTERY AND RIGHT GREATER SAPHENOUS VEIN GRAFT PER ENDOSCOPIC VEIN HARVESTING, MITRAL VALVE REPAIR AND PRP (N/A Chest) Diagnosis:       Mitral valve insufficiency, unspecified etiology      Coronary artery disease involving native heart without angina pectoris, unspecified vessel or lesion type      (Mitral valve insufficiency, unspecified etiology [I34.0])      (Coronary artery disease involving native heart without angina pectoris, unspecified vessel or lesion type [I25.10])    Surgeon:  Kevin Tobias MD Provider:  Isrrael Clemens MD    Anesthesia Type:  general ASA Status:  4          Anesthesia Type: general  Last vitals  BP   122/69 (07/19/18 1605)   Temp   36.9 °C (98.4 °F) (07/19/18 1130)   Pulse   75 (07/19/18 1605)   Resp   12 (07/19/18 1430)     SpO2   99 % (07/19/18 1605)     Post Anesthesia Care and Evaluation    Patient location during evaluation: bedside  Patient participation: complete - patient participated  Level of consciousness: awake  Pain management: adequate  Airway patency: patent  Anesthetic complications: No anesthetic complications    Cardiovascular status: acceptable  Respiratory status: acceptable  Hydration status: acceptable    Comments: /69   Pulse 75   Temp 36.9 °C (98.4 °F) (Oral)   Resp 12   Ht 162.6 cm (64.02\")   Wt 97.3 kg (214 lb 8.1 oz)   [facility] 06/28/2018   SpO2 99%   BMI 36.80 kg/m²         "

## 2018-07-19 NOTE — PERIOPERATIVE NURSING NOTE
PT FORGOT BLOOD BRACELET . BLOOD REDRAWN FOR TYPE AND CROSS. PT ALSO UTILIZES INSULIN PUMP. DISCONNECTED PUMP AT 0345 THIS AM. BLOOD SUGAR 220 THIS AM.

## 2018-07-19 NOTE — ANESTHESIA PROCEDURE NOTES
Central Line    Patient location during procedure: OR  Start time: 7/19/2018 7:16 AM  Stop Time:7/19/2018 7:25 AM  Indications: vascular access  Staff  Anesthesiologist: JESSIE RAYMUNDO  Preanesthetic Checklist  Completed: patient identified and risks and benefits discussed  Central Line Prep  Sterile Tech:cap, gloves, gown, mask and sterile barriers  Prep: chloraprep  Patient monitoring: blood pressure monitoring, continuous pulse oximetry and EKG  Central Line Procedure  Laterality:right  Location:internal jugular  Catheter Type:Cordis  Catheter Size:9 Fr  Guidance:landmark technique  Assessment  Post procedure:biopatch applied, line sutured, occlusive dressing applied and secured with tape  Assessement:blood return through all ports, free fluid flow and chest x-ray ordered  Complications:no  Patient Tolerance:patient tolerated the procedure well with no apparent complications  Additional Notes  Wire seen in svc on LAURO

## 2018-07-19 NOTE — ANESTHESIA PROCEDURE NOTES
Procedure Performed: Emergent/Open-Heart Anesthesia LAURO     Start Time:        End Time:      Preanesthesia Checklist:  Procedure being performed at surgeon's request.    General Procedure Information  Diagnostic Indications for Echo:  assessment of ascending aorta, assessment of surgical repair, defect repair evaluation and hemodynamic monitoring  Physician Requesting Echo: INDIO JOHNSON  CPT Code:  Mitral regurgitation, aortic regurgitation, tricuspid regurgitation, diastolic dysfunction  Location performed:  OR  Intubated  Heart visualized  Probe Insertion:  Easy  Probe Type:  Multiplane  Modalities:  Color flow mapping, continuous wave Doppler and pulse wave Doppler    Echocardiographic and Doppler Measurements    Ventricles    Right Ventricle:  Cavity size normal.  Thrombus not present.  Global function normal.    Left Ventricle:  Cavity size dilated.  Hypertrophy present.  Global Function normal.  Ejection Fraction 56%.      Ventricular Regional Function:  1- Basal Anteroseptal:  normal  2- Basal Anterior:  normal  3- Basal Anterolateral:  normal  4- Basal Inferolateral:  normal  5- Basal Inferior:  normal  6- Basal Inferoseptal:  normal  7- Mid Anteroseptal:  normal  8- Mid Anterior:  normal  9- Mid Anterolateral:  normal  10- Mid Inferolateral:  normal  11- Mid Inferior:  normal  12- Mid Inferoseptal:  normal  13- Apical Anterior:  normal  14- Apical Lateral:  normal  15- Apical Inferior:  normal  16- Apical Septal:  normal  17- [place]:  normal      Valves    Aortic Valve:  Annulus normal.  Area: 2.12 cm².  Mean Gradient: 5 mmHg.  Regurgitation mild.  Leaflets normal.  Leaflet motions normal.      Mitral Valve:  Annulus normal.  Stenosis mild.  Area: 2.53 cm².  Mean Gradient: 4 mmHg.  Vena Contracta Width: 0.74 cm.  Mitral Pressure Half-time: 87 ms.  Regurgitation severe.  Leaflets thickened.  Leaflet motions restricted.  Specific leaflet segments with abnormal motions are described in the following  comments:       anterior and posterior    Tricuspid Valve:  Annulus normal.  Stenosis not present.  Regurgitation trace.  Leaflets normal.  Leaflet motions normal.          Aorta    Ascending Aorta:  Size normal.  Dissection not present.  Plaque thickness less than 3 mm.  Mobile plaque not present.    Aortic Arch:  Size normal.  Dissection not present.  Plaque thickness less than 3 mm.  Mobile plaque not present.    Descending Aorta:  Size normal.  Dissection not present.  Plaque thickness less than 3 mm.  Mobile plaque not present.          Atria    Right Atrium:  Size normal.    Left Atrium:  Size dilated.  Left atrial appendage normal.      Septa    Atrial Septum:  Intra-atrial septal morphology normal.      Ventricular Septum:  Intra-ventricular septum morphology normal.      Diastolic Function Measurements:  Diastolic Dysfunction Grade= II  E= ms  A= ms  E/A Ratio= 1.4  DT= ms  S/D=  .7  IVRT=    Other Findings  Pericardium:  normal  Pleural Effusion:  none  Pulmonary Arteries:  normal  Pulmonary Venous Flow:  blunted (decreased) systolic flow    Anesthesia Information  Performed Personally  Anesthesiologist:  JESSIE RAYMUNDO

## 2018-07-19 NOTE — ANESTHESIA PROCEDURE NOTES
Arterial Line    Patient location during procedure: OR  Start time: 7/19/2018 6:49 AM  Stop Time:7/19/2018 6:53 AM       Line placed for hemodynamic monitoring.  Performed By   Anesthesiologist: JESSIE RAYMUNDO  Preanesthetic Checklist  Completed: patient identified and risks and benefits discussed  Arterial Line Prep   Sterile Tech: gloves  Prep: ChloraPrep  Patient monitoring: blood pressure monitoring, continuous pulse oximetry and EKG  Arterial Line Procedure   Laterality:left  Location:  radial artery  Catheter size: 20 G   Guidance: landmark technique and palpation technique  Successful placement: yes          Post Assessment   Dressing Type: occlusive dressing applied and secured with tape.   Complications no  Patient Tolerance: patient tolerated the procedure well with no apparent complications

## 2018-07-19 NOTE — ANESTHESIA PROCEDURE NOTES
Airway  Difficult airway    General Information and Staff    Anesthesiologist: JESSIE RAYMUNDO    Indications and Patient Condition    Preoxygenated: yes  Mask difficulty assessment: 1 - vent by mask    Final Airway Details  Final airway type: endotracheal airway      Successful airway: ETT  Cuffed: yes   Successful intubation technique: direct laryngoscopy  Facilitating devices/methods: anterior pressure/BURP and intubating stylet  Endotracheal tube insertion site: oral  Blade: Sharp  Blade size: #2  ETT size: 7.5 mm  Cormack-Lehane Classification: grade IV - neither glottis nor epiglottis seen  Placement verified by: chest auscultation and capnometry   Measured from: teeth  ETT to teeth (cm): 22  Number of attempts at approach: 2    Additional Comments  Teeth checked after intubation, no damage noted.

## 2018-07-19 NOTE — OP NOTE
Operative Note    Date of Dictation: 07/19/18    Date of Procedure: Same    Referring Physician: Kevin Tobias MD    Indications:    Surgery was indicated to prolong survival and decrease symptoms    Preoperative diagnosis:  1. Severe mitral regurgitation  2. Multivessel CAD  3. Obesity  4. Dyspnea of exertion  5. Rheumatic mitral disease    Postoperative diagnosis:  Same    Procedure:  1. CABG x 4 with LIMA-LAD and SVGs (sequential) to PDA-PLV and individual to diagonal 2.  2. Mitral valve repair with a 26 mm Physio II ring annuloplasty and posterior-medial commisuroplasty  3. EVH right leg       Surgeon: Kevin Tobias MD     Assistants: JACQUES Blake    Anesthesia: General endotracheal anesthesia and LAURO    Findings:  The saphenous vein was harvested endoscopically form the right leg. The vein had a diameter of 4 mm and was of excellent quality. The coronaries had diameters from 1.5 to 2.25 mm.    Blood loss: It is documented in the anesthesia record    STS Data: The patient was explained the risks and benefits of surgery as calculated by the STS risk tool and agreed to proceed. The antibiotics and B blockers were given in the STS required window    Description of the procedure:     The patient was placed supine on the operative table. Anesthesia was given and lines placed. The patient was prepped and draped using the usual sterile technique. A median sternotomy was performed with a scalpel and the layers carried down to the sternum using the electrocautery. The sternum was splited in the midline using a vertical oscillating saw. Hemostasis was achieved. The LIMA was harvested as a pedicle and prepared with papaverine. It was of good quality. 300 units/kg of IV heparin was given to an ACT over 400. A Favaloro retractor was placed and the mediastinum exposed, the pericardium was opened and the edges tacked to the wound. Cannulation sutures were placed in the ascending aorta and both cavas. Small cannulas  were placed and aorto bicaval cardiopulmonary bypass was started. Cardioplegia cannulas were placed and then the ascending aorta was clamped. One liter of cold blood cardioplegia was given in an antegrade and retrograde fashion to achieved diastolic arrest and further doses every 15 minutes thereafter. Constructions of grafts were done in the sequence distal - proximal between the reversed saphenous vein graft and each coronary targets. Grafts were constructed to the PDA-PLV sequentially and to the diagonal coronary arteries and plegia given between sequences after de-airing the aortic root. The LIMA was anastomosed to the mid LAD.    Following, a left atriotomy was performed through the right interatrial groove. The left atrial cavity and the mitral valve were exposed using self retracted blades attached to the retractor. The valve showed rheumatic changes but the anterior leaflet was pliable. The posterior leaflet was fibrotic. I used 2.0 Ethibond non pledgeted sutures in a plication fashion around the annulus and selected a 26 mm Physio II ring. The sutures were passed through the ring and it was secured with the kor not device. There was a small residual leak in the posterior medial commisure and I placed 2 5.0 prolene sutures to close the gap. The valve opening was evaluated with a sucker and it was greater the 2-2.5 cm2. The atrium was closed with 3.0 prolene sutures    The warm dose of cardioplegia was given and then the aortic clamp removed as well as the LIMA bulldog clamp. All anastomoses were checked and had good flow and morphology. The suture lines were checked for hemostasis as well. The left pleural space was suctioned and the lungs ventilated. The heart was paced till regular atrial rhythm resumed. I allowed the heart to eject and the left heart chambers were de-aired using the standard techniques under LAURO guidance. Once hemodynamics were acceptable, then the CPB was discontinued and the venous and  cardioplegia cannulas removed. The matching dose of protamine was given and the aortic cannula removed as well. AV temporary wires and pleural and mediastinal chest tubes were placed and the wound sprayed with platelet rich plasma.  The sternum was closed with single and double wires and soft tissue in layers of reabsorbable material. The wounds were covered with sterile dressings. The post LAURO showed no MR and mild MS in views with a good axis. The pulmonary artery pressures were low.    Specimen removed: none    CPB time:  109 minutes    Aortic clamp time:  86 minutes       Complications:  None           Disposition: Cardiovascular recovery room           Condition: Critical but stable.

## 2018-07-20 ENCOUNTER — APPOINTMENT (OUTPATIENT)
Dept: GENERAL RADIOLOGY | Facility: HOSPITAL | Age: 53
End: 2018-07-20

## 2018-07-20 LAB
ALBUMIN SERPL-MCNC: 3.6 G/DL (ref 3.5–5.2)
ANION GAP SERPL CALCULATED.3IONS-SCNC: 12 MMOL/L
BASOPHILS # BLD AUTO: 0.02 10*3/MM3 (ref 0–0.2)
BASOPHILS NFR BLD AUTO: 0.2 % (ref 0–1.5)
BUN BLD-MCNC: 16 MG/DL (ref 6–20)
BUN/CREAT SERPL: 15 (ref 7–25)
CALCIUM SPEC-SCNC: 8.4 MG/DL (ref 8.6–10.5)
CHLORIDE SERPL-SCNC: 108 MMOL/L (ref 98–107)
CO2 SERPL-SCNC: 22 MMOL/L (ref 22–29)
CREAT BLD-MCNC: 1.07 MG/DL (ref 0.57–1)
DEPRECATED RDW RBC AUTO: 47.7 FL (ref 37–54)
EOSINOPHIL # BLD AUTO: 0.09 10*3/MM3 (ref 0–0.7)
EOSINOPHIL NFR BLD AUTO: 0.8 % (ref 0.3–6.2)
ERYTHROCYTE [DISTWIDTH] IN BLOOD BY AUTOMATED COUNT: 13.9 % (ref 11.7–13)
GFR SERPL CREATININE-BSD FRML MDRD: 54 ML/MIN/1.73
GLUCOSE BLD-MCNC: 99 MG/DL (ref 65–99)
GLUCOSE BLDC GLUCOMTR-MCNC: 102 MG/DL (ref 70–130)
GLUCOSE BLDC GLUCOMTR-MCNC: 112 MG/DL (ref 70–130)
GLUCOSE BLDC GLUCOMTR-MCNC: 117 MG/DL (ref 70–130)
GLUCOSE BLDC GLUCOMTR-MCNC: 187 MG/DL (ref 70–130)
GLUCOSE BLDC GLUCOMTR-MCNC: 197 MG/DL (ref 70–130)
GLUCOSE BLDC GLUCOMTR-MCNC: 220 MG/DL (ref 70–130)
GLUCOSE BLDC GLUCOMTR-MCNC: 244 MG/DL (ref 70–130)
GLUCOSE BLDC GLUCOMTR-MCNC: 83 MG/DL (ref 70–130)
GLUCOSE BLDC GLUCOMTR-MCNC: 89 MG/DL (ref 70–130)
GLUCOSE BLDC GLUCOMTR-MCNC: 95 MG/DL (ref 70–130)
HCT VFR BLD AUTO: 34.3 % (ref 35.6–45.5)
HGB BLD-MCNC: 10.9 G/DL (ref 11.9–15.5)
IMM GRANULOCYTES # BLD: 0.02 10*3/MM3 (ref 0–0.03)
IMM GRANULOCYTES NFR BLD: 0.2 % (ref 0–0.5)
INR PPP: 1.36 (ref 0.9–1.1)
LYMPHOCYTES # BLD AUTO: 1.81 10*3/MM3 (ref 0.9–4.8)
LYMPHOCYTES NFR BLD AUTO: 16.1 % (ref 19.6–45.3)
MAGNESIUM SERPL-MCNC: 2.6 MG/DL (ref 1.6–2.6)
MCH RBC QN AUTO: 29.9 PG (ref 26.9–32)
MCHC RBC AUTO-ENTMCNC: 31.8 G/DL (ref 32.4–36.3)
MCV RBC AUTO: 94 FL (ref 80.5–98.2)
MONOCYTES # BLD AUTO: 0.87 10*3/MM3 (ref 0.2–1.2)
MONOCYTES NFR BLD AUTO: 7.8 % (ref 5–12)
NEUTROPHILS # BLD AUTO: 8.4 10*3/MM3 (ref 1.9–8.1)
NEUTROPHILS NFR BLD AUTO: 74.9 % (ref 42.7–76)
PHOSPHATE SERPL-MCNC: 4 MG/DL (ref 2.5–4.5)
PLATELET # BLD AUTO: 84 10*3/MM3 (ref 140–500)
PMV BLD AUTO: 11.9 FL (ref 6–12)
POTASSIUM BLD-SCNC: 3.7 MMOL/L (ref 3.5–5.2)
PROTHROMBIN TIME: 16.5 SECONDS (ref 11.7–14.2)
RBC # BLD AUTO: 3.65 10*6/MM3 (ref 3.9–5.2)
SODIUM BLD-SCNC: 142 MMOL/L (ref 136–145)
WBC NRBC COR # BLD: 11.21 10*3/MM3 (ref 4.5–10.7)

## 2018-07-20 PROCEDURE — 93005 ELECTROCARDIOGRAM TRACING: CPT | Performed by: THORACIC SURGERY (CARDIOTHORACIC VASCULAR SURGERY)

## 2018-07-20 PROCEDURE — 99024 POSTOP FOLLOW-UP VISIT: CPT | Performed by: NURSE PRACTITIONER

## 2018-07-20 PROCEDURE — 99024 POSTOP FOLLOW-UP VISIT: CPT | Performed by: THORACIC SURGERY (CARDIOTHORACIC VASCULAR SURGERY)

## 2018-07-20 PROCEDURE — 94799 UNLISTED PULMONARY SVC/PX: CPT

## 2018-07-20 PROCEDURE — 63710000001 INSULIN ASPART PER 5 UNITS: Performed by: INTERNAL MEDICINE

## 2018-07-20 PROCEDURE — 97110 THERAPEUTIC EXERCISES: CPT

## 2018-07-20 PROCEDURE — 25010000003 CEFAZOLIN IN DEXTROSE 2-4 GM/100ML-% SOLUTION: Performed by: THORACIC SURGERY (CARDIOTHORACIC VASCULAR SURGERY)

## 2018-07-20 PROCEDURE — 80069 RENAL FUNCTION PANEL: CPT | Performed by: THORACIC SURGERY (CARDIOTHORACIC VASCULAR SURGERY)

## 2018-07-20 PROCEDURE — 82962 GLUCOSE BLOOD TEST: CPT

## 2018-07-20 PROCEDURE — 71045 X-RAY EXAM CHEST 1 VIEW: CPT

## 2018-07-20 PROCEDURE — 83735 ASSAY OF MAGNESIUM: CPT | Performed by: THORACIC SURGERY (CARDIOTHORACIC VASCULAR SURGERY)

## 2018-07-20 PROCEDURE — 63710000001 INSULIN DETEMIR PER 5 UNITS: Performed by: INTERNAL MEDICINE

## 2018-07-20 PROCEDURE — 85610 PROTHROMBIN TIME: CPT | Performed by: THORACIC SURGERY (CARDIOTHORACIC VASCULAR SURGERY)

## 2018-07-20 PROCEDURE — 93010 ELECTROCARDIOGRAM REPORT: CPT | Performed by: INTERNAL MEDICINE

## 2018-07-20 PROCEDURE — 97162 PT EVAL MOD COMPLEX 30 MIN: CPT

## 2018-07-20 PROCEDURE — 25010000002 METOCLOPRAMIDE PER 10 MG: Performed by: THORACIC SURGERY (CARDIOTHORACIC VASCULAR SURGERY)

## 2018-07-20 PROCEDURE — 85025 COMPLETE CBC W/AUTO DIFF WBC: CPT | Performed by: THORACIC SURGERY (CARDIOTHORACIC VASCULAR SURGERY)

## 2018-07-20 PROCEDURE — 99222 1ST HOSP IP/OBS MODERATE 55: CPT | Performed by: INTERNAL MEDICINE

## 2018-07-20 PROCEDURE — 99223 1ST HOSP IP/OBS HIGH 75: CPT | Performed by: INTERNAL MEDICINE

## 2018-07-20 RX ORDER — QUETIAPINE FUMARATE 200 MG/1
200 TABLET, FILM COATED ORAL NIGHTLY
Status: DISCONTINUED | OUTPATIENT
Start: 2018-07-20 | End: 2018-07-24 | Stop reason: HOSPADM

## 2018-07-20 RX ORDER — BUDESONIDE AND FORMOTEROL FUMARATE DIHYDRATE 160; 4.5 UG/1; UG/1
2 AEROSOL RESPIRATORY (INHALATION)
Status: DISCONTINUED | OUTPATIENT
Start: 2018-07-20 | End: 2018-07-24 | Stop reason: HOSPADM

## 2018-07-20 RX ORDER — LEVOTHYROXINE SODIUM 0.1 MG/1
200 TABLET ORAL DAILY
Status: DISCONTINUED | OUTPATIENT
Start: 2018-07-20 | End: 2018-07-24 | Stop reason: HOSPADM

## 2018-07-20 RX ORDER — MONTELUKAST SODIUM 10 MG/1
10 TABLET ORAL NIGHTLY
Status: DISCONTINUED | OUTPATIENT
Start: 2018-07-20 | End: 2018-07-24 | Stop reason: HOSPADM

## 2018-07-20 RX ADMIN — GABAPENTIN 600 MG: 300 CAPSULE ORAL at 13:45

## 2018-07-20 RX ADMIN — METOCLOPRAMIDE 10 MG: 5 INJECTION, SOLUTION INTRAMUSCULAR; INTRAVENOUS at 00:22

## 2018-07-20 RX ADMIN — HYDROCODONE BITARTRATE AND ACETAMINOPHEN 2 TABLET: 5; 325 TABLET ORAL at 05:31

## 2018-07-20 RX ADMIN — ACETAMINOPHEN 650 MG: 325 TABLET, FILM COATED ORAL at 19:53

## 2018-07-20 RX ADMIN — MONTELUKAST 10 MG: 10 TABLET, FILM COATED ORAL at 21:49

## 2018-07-20 RX ADMIN — METOPROLOL TARTRATE 12.5 MG: 25 TABLET ORAL at 09:05

## 2018-07-20 RX ADMIN — OXYCODONE HYDROCHLORIDE 10 MG: 5 TABLET ORAL at 08:57

## 2018-07-20 RX ADMIN — ACETAMINOPHEN 650 MG: 325 TABLET, FILM COATED ORAL at 09:08

## 2018-07-20 RX ADMIN — MUPIROCIN 10 APPLICATION: 20 OINTMENT TOPICAL at 08:12

## 2018-07-20 RX ADMIN — INSULIN DETEMIR 15 UNITS: 100 INJECTION, SOLUTION SUBCUTANEOUS at 08:57

## 2018-07-20 RX ADMIN — CEFAZOLIN SODIUM 2 G: 2 INJECTION, SOLUTION INTRAVENOUS at 21:58

## 2018-07-20 RX ADMIN — BUDESONIDE AND FORMOTEROL FUMARATE DIHYDRATE 2 PUFF: 160; 4.5 AEROSOL RESPIRATORY (INHALATION) at 15:31

## 2018-07-20 RX ADMIN — OXYCODONE HYDROCHLORIDE 10 MG: 5 TABLET ORAL at 19:53

## 2018-07-20 RX ADMIN — METOCLOPRAMIDE 10 MG: 5 INJECTION, SOLUTION INTRAMUSCULAR; INTRAVENOUS at 05:31

## 2018-07-20 RX ADMIN — PANTOPRAZOLE SODIUM 40 MG: 40 TABLET, DELAYED RELEASE ORAL at 08:12

## 2018-07-20 RX ADMIN — GABAPENTIN 600 MG: 300 CAPSULE ORAL at 05:31

## 2018-07-20 RX ADMIN — INSULIN ASPART 3 UNITS: 100 INJECTION, SOLUTION INTRAVENOUS; SUBCUTANEOUS at 16:29

## 2018-07-20 RX ADMIN — INSULIN DETEMIR 15 UNITS: 100 INJECTION, SOLUTION SUBCUTANEOUS at 21:51

## 2018-07-20 RX ADMIN — QUETIAPINE FUMARATE 200 MG: 200 TABLET, FILM COATED ORAL at 21:48

## 2018-07-20 RX ADMIN — TOPIRAMATE 300 MG: 100 TABLET, FILM COATED ORAL at 23:08

## 2018-07-20 RX ADMIN — DOCUSATE SODIUM -SENNOSIDES 2 TABLET: 50; 8.6 TABLET, COATED ORAL at 21:49

## 2018-07-20 RX ADMIN — BUPROPION HYDROCHLORIDE 450 MG: 150 TABLET, EXTENDED RELEASE ORAL at 08:44

## 2018-07-20 RX ADMIN — INSULIN ASPART 3 UNITS: 100 INJECTION, SOLUTION INTRAVENOUS; SUBCUTANEOUS at 21:51

## 2018-07-20 RX ADMIN — GABAPENTIN 600 MG: 300 CAPSULE ORAL at 21:48

## 2018-07-20 RX ADMIN — CEFAZOLIN SODIUM 2 G: 2 INJECTION, SOLUTION INTRAVENOUS at 03:01

## 2018-07-20 RX ADMIN — INSULIN ASPART 2 UNITS: 100 INJECTION, SOLUTION INTRAVENOUS; SUBCUTANEOUS at 11:30

## 2018-07-20 RX ADMIN — METOPROLOL TARTRATE 12.5 MG: 25 TABLET ORAL at 21:49

## 2018-07-20 RX ADMIN — ASPIRIN 81 MG: 81 TABLET ORAL at 08:12

## 2018-07-20 RX ADMIN — BUDESONIDE AND FORMOTEROL FUMARATE DIHYDRATE 2 PUFF: 160; 4.5 AEROSOL RESPIRATORY (INHALATION) at 19:46

## 2018-07-20 RX ADMIN — CEFAZOLIN SODIUM 2 G: 2 INJECTION, SOLUTION INTRAVENOUS at 11:30

## 2018-07-20 RX ADMIN — CHLORHEXIDINE GLUCONATE 15 ML: 1.2 RINSE ORAL at 00:22

## 2018-07-20 RX ADMIN — MUPIROCIN 10 APPLICATION: 20 OINTMENT TOPICAL at 21:48

## 2018-07-20 RX ADMIN — LEVOTHYROXINE SODIUM 200 MCG: 100 TABLET ORAL at 11:30

## 2018-07-20 RX ADMIN — OXYCODONE HYDROCHLORIDE 10 MG: 5 TABLET ORAL at 13:45

## 2018-07-20 RX ADMIN — ACETAMINOPHEN 650 MG: 325 TABLET, FILM COATED ORAL at 13:45

## 2018-07-20 NOTE — PLAN OF CARE
Problem: Patient Care Overview  Goal: Plan of Care Review  Outcome: Ongoing (interventions implemented as appropriate)   07/20/18 5772   Coping/Psychosocial   Plan of Care Reviewed With patient   Plan of Care Review   Progress improving   OTHER   Outcome Summary Patient transferred to CVU today. Progressing well. VSS. Will continue to monitor.       Problem: Fall Risk (Adult)  Goal: Identify Related Risk Factors and Signs and Symptoms  Outcome: Outcome(s) achieved Date Met: 07/20/18    Goal: Absence of Fall  Outcome: Ongoing (interventions implemented as appropriate)      Problem: Skin Injury Risk (Adult)  Goal: Identify Related Risk Factors and Signs and Symptoms  Outcome: Outcome(s) achieved Date Met: 07/20/18    Goal: Skin Health and Integrity  Outcome: Ongoing (interventions implemented as appropriate)      Problem: Cardiac Surgery (Adult)  Goal: Signs and Symptoms of Listed Potential Problems Will be Absent, Minimized or Managed (Cardiac Surgery)  Outcome: Ongoing (interventions implemented as appropriate)

## 2018-07-20 NOTE — THERAPY EVALUATION
Acute Care - Physical Therapy Initial Evaluation  [facility]     Patient Name: Angeles Oquendo  : 1965  MRN: [id number]  Today's Date: 2018   Onset of Illness/Injury or Date of Surgery: 18  Date of Referral to PT: 18  Referring Physician: Jatinder      Admit Date: 2018    Visit Dx:     ICD-10-CM ICD-9-CM   1. Coronary artery disease involving native coronary artery of native heart with angina pectoris (CMS/HCC) I25.119 414.01     413.9   2. Coronary artery disease involving native heart without angina pectoris, unspecified vessel or lesion type I25.10 414.01   3. Mitral valve insufficiency, unspecified etiology I34.0 424.0     Patient Active Problem List   Diagnosis   • Severe mitral regurgitation   • Coronary artery disease involving native coronary artery of native heart with angina pectoris (CMS/HCC)   • Mitral valve insufficiency   • Coronary artery disease involving native heart without angina pectoris     Past Medical History:   Diagnosis Date   • Anesthesia complication     WITH LAURO TOOK A  LOT TO GET HER TO SLEEP   • Asthma    • Bipolar I disorder, single manic episode (CMS/HCC)    • Bursitis     LEFT ELBOW   • Chronic pain syndrome     SEES DR GODWIN NOLASCO FOR PAIN   • Coronary artery disease    • Diabetes mellitus (CMS/[facility])     TYPE 2. HAS INSULIN PUMP WITH CONT BASAL RATE   • Disc degeneration, lumbar    • Esophageal reflux    • Fibromyalgia    • H/O seasonal allergies    • History of sleep apnea      SLEEP STUDY. HAD MACHINE THEN. USED 3YRS. SLEEP STUDY REPEATED. WAS OKED NOT TO USE MACHING ANY MORE   • Hyperlipidemia    • Hypertension    • Hypothyroidism    • Mitral regurgitation    • PCOS (polycystic ovarian syndrome)    • Rheumatoid arthritis (CMS/HCC)    • Statin intolerance     Severe myalgias    • Vitamin D deficiency      Past Surgical History:   Procedure Laterality Date   • CARDIAC CATHETERIZATION N/A 2018    Procedure: Right and Left Heart Cath and cors;   Surgeon: Stalin Handy MD;  Location:  [facility] CATH INVASIVE LOCATION;  Service: Cardiovascular   • CARDIAC CATHETERIZATION N/A 2018    Procedure: Coronary angiography;  Surgeon: Stalin Handy MD;  Location:  [facility] CATH INVASIVE LOCATION;  Service: Cardiovascular   • CARDIAC CATHETERIZATION N/A 2018    Procedure: Left ventriculography;  Surgeon: Stalin Handy MD;  Location:  [facility] CATH INVASIVE LOCATION;  Service: Cardiovascular   •  SECTION      X2   • ENDOMETRIAL ABLATION      X2   • TONSILLECTOMY     • UTERINE FIBROID SURGERY      Ablasion        PT ASSESSMENT (last 12 hours)      Physical Therapy Evaluation     Row Name 18 0847          PT Evaluation Time/Intention    Subjective Information complains of;pain  -MD     Document Type evaluation  -MD     Mode of Treatment physical therapy  -MD     Patient Effort good  -MD     Row Name 18 0847          General Information    Patient Profile Reviewed? yes  -MD     Onset of Illness/Injury or Date of Surgery 18  -MD     Referring Physician Jatinder  -MD     Patient Observations alert;cooperative;agree to therapy  -MD     Patient/Family Observations Pt seated in chair showing no signs of acute distress.  -MD     Prior Level of Function independent:  -MD     Equipment Currently Used at Home none  -MD     Existing Precautions/Restrictions fall;cardiac;sternal  -MD     Row Name 18 0847          Relationship/Environment    Primary Source of Support/Comfort parent;child(dalia)  -MD     Lives With child(dalia), dependent;parent(s)  -MD     Row Name 18 0847          Cognitive Assessment/Intervention- PT/OT    Orientation Status (Cognition) oriented x 3  -MD     Follows Commands (Cognition) WNL  -MD     Row Name 18 0847          Bed Mobility Assessment/Treatment    Bed Mobility Assessment/Treatment sit-supine  -MD     Sit-Supine [place] (Bed Mobility) verbal cues;moderate assist (50% patient effort);2 person  assist  -MD     Bed Mobility, Safety Issues decreased use of legs for bridging/pushing  -MD     Row Name 07/20/18 0847          Transfer Assessment/Treatment    Transfer Assessment/Treatment sit-stand transfer;stand-sit transfer  -MD     Sit-Stand [place] (Transfers) verbal cues;minimum assist (75% patient effort)  -MD     Stand-Sit [place] (Transfers) verbal cues;minimum assist (75% patient effort)  -MD     Row Name 07/20/18 08          Gait/Stairs Assessment/Training    [place] Level (Gait) verbal cues;contact guard;minimum assist (75% patient effort);2 person assist  -MD     Distance in Feet (Gait) 50  -MD     Pattern (Gait) swing-through  -MD     Deviations/Abnormal Patterns (Gait) festinating/shuffling;gait speed decreased;stride length decreased;solitario decreased  -MD     Bilateral Gait Deviations forward flexed posture  -MD     Row Name 07/20/18 0847          General ROM    GENERAL ROM COMMENTS B LE AROM WFL  -MD     Row Name 07/20/18 0847          MMT (Manual Muscle Testing)    Additional Documentation General Assessment (Manual Muscle Testing) (Group)  -MD     Row Name 07/20/18 0847          General Assessment (Manual Muscle Testing)    General Manual Muscle Testing (MMT) Assessment lower extremity strength deficits identified  -MD     Row Name 07/20/18 0847          Lower Extremity (Manual Muscle Testing)    Lower Extremity: Manual Muscle Testing (MMT) left LE strength is WFL;right LE strength is WFL  -MD     Comment, MMT: Lower Extremity B LE 3/5 grossly  -MD     Row Name 07/20/18 0847          Pain Assessment    Additional Documentation Pain Scale: Numbers Pre/Post-Treatment (Group)  -MD     Row Name 07/20/18 0847          Pain Scale: Numbers Pre/Post-Treatment    Pain Scale: Numbers, Pretreatment 6/10  -MD     Pain Location - Orientation incisional  -MD     Pain Location chest  -MD     Pain Intervention(s) Repositioned;Ambulation/increased activity  -MD     Row Name             Wound  07/19/18 0957 chest incision    Wound - Properties Group Date first assessed: 07/19/18  -SR Time first assessed: 0957  -SR Location: chest  -SR Type: incision  -SR    Row Name             Wound 07/19/18 0957 Right leg incision    Wound - Properties Group Date first assessed: 07/19/18  -SR Time first assessed: 0957  -SR Side: Right  -SR Location: leg  -SR Type: incision  -SR    Row Name 07/20/18 0847          Physical Therapy Clinical Impression    Date of Referral to PT 07/20/18  -MD     PT Diagnosis (PT Clinical Impression) s/p CABG x4 and MVR  -MD     Prognosis (PT Clinical Impression) good  -MD     Functional Level at Time of Evaluation (PT Clinical Impression) Min A x2  -MD     Criteria for Skilled Interventions Met (PT Clinical Impression) yes;treatment indicated  -MD     Pathology/Pathophysiology Noted (Describe Specifically for Each System) musculoskeletal  -MD     Impairments Found (describe specific impairments) aerobic capacity/endurance;gait, locomotion, and balance  -MD     Functional Limitations in Following Categories (Describe Specific Limitations) self-care;home management;community/leisure  -MD     Disability: Inability to Perform Actions/Activities of Required Roles (describe specific disability) community/leisure  -MD     Rehab Potential (PT Clinical Summary) good, to achieve stated therapy goals  -MD     Predicted Duration of Therapy (PT) 1 wk  -MD     Row Name 07/20/18 0847          Physical Therapy Goals    Problem Specific Goal Selection (PT) problem specific goal 1, PT  -MD     Additional Documentation Problem Specific Goal Selection (PT) (Row)  -MD     Row Name 07/20/18 0847          Problem Specific Goal 1 (PT)    Problem Specific Goal 1 (PT) cardiac level 5  -MD     Time Frame (Problem Specific Goal 1, PT) 5 - 7 days  -MD     Row Name 07/20/18 0847          Positioning and Restraints    Pre-Treatment Position sitting in chair/recliner  -MD     Post Treatment Position bed  -MD     In Bed  supine;patient within staff view  -MD       User Key  (r) = Recorded By, (t) = Taken By, (c) = Cosigned By    Initials Name Provider Type    MD Kelly Spears, PT Physical Therapist    SR Jackie Dia RN Registered Nurse          Physical Therapy Education     Title: PT OT SLP Therapies (Active)     Topic: Physical Therapy (Active)     Point: Precautions (Done)    Learning Progress Summary     Learner Status Readiness Method Response Comment Documented by    Patient Done Acceptance E VU  MD 07/20/18 0905                      User Key     Initials Effective Dates Name Provider Type Renetta JONES 04/03/18 -  Kelly Spears, PT Physical Therapist PT                PT Recommendation and Plan  Anticipated Discharge Disposition (PT): home with home health  Planned Therapy Interventions (PT Eval): bed mobility training, gait training, stair training, transfer training  Therapy Frequency (PT Clinical Impression): daily  Outcome Summary/Treatment Plan (PT)  Anticipated Discharge Disposition (PT): home with home health  Plan of Care Reviewed With: patient  Progress: improving  Outcome Summary: Pt presents s/p CABG x4 and MVR resulting in decreased independence and safety w mobility.  Due to the above pt will benifit from skilled PT to address mobility deficits and increase independent and safe mobility.          Outcome Measures     Row Name 07/20/18 0900             How much help from another person do you currently need...    Turning from your back to your side while in flat bed without using bedrails? 2  -MD      Moving from lying on back to sitting on the side of a flat bed without bedrails? 2  -MD      Moving to and from a bed to a chair (including a wheelchair)? 3  -MD      Standing up from a chair using your arms (e.g., wheelchair, bedside chair)? 3  -MD      Climbing 3-5 steps with a railing? 2  -MD      To walk in hospital room? 3  -MD      AM-PAC 6 Clicks Score 15  -MD         Functional Assessment    Outcome Measure  Options AM-PAC 6 Clicks Basic Mobility (PT)  -MD        User Key  (r) = Recorded By, (t) = Taken By, (c) = Cosigned By    Initials Name Provider Type    MD Kelly Spears PT Physical Therapist           Time Calculation:         PT Charges     Row Name 07/20/18 0908             Time Calculation    Start Time 0827  -MD      Stop Time 0844  -MD      Time Calculation (min) 17 min  -MD      PT Received On 07/20/18  -MD      PT - Next Appointment 07/21/18  -MD      PT Goal Re-Cert Due Date 07/27/18  -MD        User Key  (r) = Recorded By, (t) = Taken By, (c) = Cosigned By    Initials Name Provider Type    MD Kelly Spears, PT Physical Therapist        Therapy Suggested Charges     Code   Minutes Charges    None           Therapy Charges for Today     Code Description Service Date Service Provider Modifiers Qty    05392697226 HC PT EVAL MOD COMPLEXITY 2 7/20/2018 Kelly Spears, PT GP 1    39109274867 HC PT THER PROC EA 15 MIN 7/20/2018 Kelly Spears, PT GP 1    96762386503 HC PT THER SUPP EA 15 MIN 7/20/2018 Kelly Spears, PT GP 1          PT G-Codes  Outcome Measure Options: AM-PAC 6 Clicks Basic Mobility (PT)      Kelly Spears PT  7/20/2018

## 2018-07-20 NOTE — CONSULTS
"Patient sleeping left information packet, which includes; general information about cardiac rehab, [facility] handout and [facility] letter article entitled “Cardiac Rehab is often the Best Medicine for Recovery\" at bedside.Will atttempted to see patient again later.  "

## 2018-07-20 NOTE — CONSULTS
53 y.o.  Patient Care Team:  Charlotte Johnson MD as PCP - General  Charlotte Johnson MD as PCP - Family Medicine  Janeth Young MD as Consulting Physician (Endocrinology)  Jim Schafer MD as Consulting Physician (Rheumatology)  Hari Merritt MD as Consulting Physician (Pain Medicine)  Cesar Remy MD as Consulting Physician (Psychiatry)  Jesus James MD as Consulting Physician (Cardiology)      No chief complaint on file.  Postoperative management of diabetes    HPI    Patient is a known diabetic for more than 16 years of which she had been on pump therapy for nearly 8 years  She's currently using Medtronic 670 G pump at home  And she does not use CGM  Pump is not at the bedside  Her average daily dosage is about 43 units  Patient does have symptoms suggestive of diabetic peripheral neuropathy  She denied any knowledge of diabetic retinopathy or nephropathy  Her recent hemoglobin A1c 6.7%.  She reported that it used to be 14% prior to pump therapy     Patient is status post coronary artery bypass grafting postoperative day 1  She just started Bledsoe  She is off the insulin drip since her blood sugars are in the 112 range     Patient will be advanced to clear liquids followed by full liquids today as per the nurse  I will schedule basal insulin every 12 hours along with sliding scale every 6 hours or before meals at bedtime  Once the patient is more comfortable in a day or 2 she may restart insulin pump therapy  Patient verbalized understanding    Past Medical History:   Diagnosis Date   • Anesthesia complication     WITH LAURO TOOK A  LOT TO GET HER TO SLEEP   • Asthma    • Bipolar I disorder, single manic episode (CMS/HCC)    • Bursitis     LEFT ELBOW   • Chronic pain syndrome     SEES DR GODWIN MERRITT FOR PAIN   • Coronary artery disease    • Diabetes mellitus (CMS/HCC)     TYPE 2. HAS INSULIN PUMP WITH CONT BASAL RATE   • Disc degeneration, lumbar    • Esophageal reflux    •  Fibromyalgia    • H/O seasonal allergies    • History of sleep apnea     2003 SLEEP STUDY. HAD MACHINE THEN. USED 3YRS. SLEEP STUDY REPEATED. WAS OKED NOT TO USE MACHING ANY MORE   • Hyperlipidemia    • Hypertension    • Hypothyroidism    • Mitral regurgitation    • PCOS (polycystic ovarian syndrome)    • Rheumatoid arthritis (CMS/HCC)    • Statin intolerance     Severe myalgias    • Vitamin D deficiency        Family History   Problem Relation Age of Onset   • Anemia Other    • Arthritis Other    • Asthma Other    • Depression Other         with anxiety   • Diabetes Other    • Hypertension Other    • Allergies Other    • Heart disease Other    • Diabetes Father    • Stroke Paternal Grandmother    • Heart attack Paternal Grandfather    • Diabetes Paternal Grandfather    • Malig Hyperthermia Neg Hx        Social History     Social History   • Marital status:      Spouse name: N/A   • Number of children: N/A   • Years of education: N/A     Occupational History   • Not on file.     Social History Main Topics   • Smoking status: Former Smoker     Packs/day: 1.00     Years: 30.00     Types: Cigarettes     Quit date: 3/28/2018   • Smokeless tobacco: Never Used   • Alcohol use No   • Drug use: No   • Sexual activity: Not on file     Other Topics Concern   • Not on file     Social History Narrative   • No narrative on file       Allergies   Allergen Reactions   • Statins Myalgia   • Sulfa Antibiotics Rash         Current Facility-Administered Medications:   •  acetaminophen (TYLENOL) suppository 650 mg, 650 mg, Rectal, Q4H PRN, Kevin Tobias MD  •  acetaminophen (TYLENOL) tablet 650 mg, 650 mg, Oral, Q4H PRN, Kevin Tobias MD  •  albumin human 5 % bottle 1,500 mL, 1,500 mL, Intravenous, PRN, Kevin Tobias MD, 500 mL at 07/19/18 1322  •  ALPRAZolam (XANAX) tablet 0.25 mg, 0.25 mg, Oral, Q8H PRN, Kevin Tobias MD  •  aspirin EC tablet 81 mg, 81 mg, Oral, Daily, Kevin Tobias MD  •  bisacodyl (DULCOLAX)  EC tablet 10 mg, 10 mg, Oral, Daily PRN, Kevin Tobias MD  •  bisacodyl (DULCOLAX) suppository 10 mg, 10 mg, Rectal, Daily PRN, Kevin Tobias MD  •  buPROPion XL (WELLBUTRIN XL) 24 hr tablet 450 mg, 450 mg, Oral, Daily, Kevin Tobias MD, 450 mg at 07/19/18 2048  •  ceFAZolin in dextrose (ANCEF) IVPB solution 2 g, 2 g, Intravenous, Q8H, Kevin Tobias MD, 2 g at 07/20/18 0301  •  chlorhexidine (PERIDEX) 0.12 % solution 15 mL, 15 mL, Mouth/Throat, Q12H, Kevin Tobias MD, 15 mL at 07/20/18 0022  •  clevidipine (CLEVIPREX) infusion 0.5 mg/mL, 2-32 mg/hr, Intravenous, Continuous PRN, Kevin Tobias MD  •  clonazePAM (KlonoPIN) tablet 1 mg, 1 mg, Oral, 4x Daily PRN, Kevin Tobias MD, 1 mg at 07/19/18 1643  •  cyclobenzaprine (FLEXERIL) tablet 10 mg, 10 mg, Oral, Q8H PRN, Kevin Tobias MD  •  dexmedetomidine (PRECEDEX) 400 mcg/100mL (4 mcg/mL) NS infusion kit, 0.2-1.5 mcg/kg/hr, Intravenous, Titrated, Kevin Tobias MD, Last Rate: 4.87 mL/hr at 07/19/18 1230, 0.2 mcg/kg/hr at 07/19/18 1230  •  DOPamine 400 mg/250 mL (1.6 mg/mL) infusion, 2-20 mcg/kg/min, Intravenous, Continuous PRN, Kevin Tobias MD  •  enoxaparin (LOVENOX) syringe 40 mg, 40 mg, Subcutaneous, Daily, Kevin Tobias MD  •  EPINEPHrine (ADRENALIN) 5,000 mcg in sodium chloride 0.9 % 250 mL (20 mcg/mL) infusion, 0.02-0.3 mcg/kg/min, Intravenous, Continuous PRN, Kevin Tobias MD  •  furosemide (LASIX) injection 40 mg, 40 mg, Intravenous, Q6H PRN, Kevin Tobias MD  •  gabapentin (NEURONTIN) capsule 600 mg, 600 mg, Oral, Q8H, Kevin Tobias MD, 600 mg at 07/20/18 0531  •  HYDROcodone-acetaminophen (NORCO) 5-325 MG per tablet 2 tablet, 2 tablet, Oral, Q4H PRN, Kevin Tobias MD, 2 tablet at 07/20/18 0531  •  insulin regular (HumuLIN R,NovoLIN R) 100 Units in sodium chloride 0.9 % 100 mL (1 Units/mL) infusion, 0-50 Units/hr, Intravenous, Continuous PRN, Kevin Tobias MD, Stopped at 07/20/18 0600  •  Magnesium Sulfate 2 gram  infusion - Mg less than or equal to 1.5 mg/dL, 2 g, Intravenous, PRN **OR** Magesium Sulfate 1 gram infusion - Mg 1.6-1.9 mg/dL, 1 g, Intravenous, PRN, Kevin Tobias MD  •  magnesium hydroxide (MILK OF MAGNESIA) suspension 2400 mg/10mL 10 mL, 10 mL, Oral, Daily PRN, Kevin Tobias MD  •  magnesium sulfate in D5W 1g/100mL (PREMIX), 1 g, Intravenous, Q8H, Kevin Tobias MD, 1 g at 07/19/18 2042  •  metoprolol tartrate (LOPRESSOR) tablet 12.5 mg, 12.5 mg, Oral, Q12H, Kevin Tobias MD, 12.5 mg at 07/19/18 2043  •  midazolam (VERSED) injection 2 mg, 2 mg, Intravenous, Q1H PRN, Kevin Tobias MD  •  milrinone 20 mg/100 mL (0.2 mg/mL) in 5 % dextrose infusion, 0.25-0.75 mcg/kg/min, Intravenous, Continuous PRN, Kevin Tobisa MD, Stopped at 07/19/18 2300  •  morphine injection 1 mg, 1 mg, Intravenous, Q4H PRN **AND** naloxone (NARCAN) injection 0.4 mg, 0.4 mg, Intravenous, Q5 Min PRN, Kevin Tobias MD  •  Morphine sulfate (PF) injection 4 mg, 4 mg, Intravenous, Q30 Min PRN, Kevin Tobias MD, 4 mg at 07/19/18 1234  •  mupirocin (BACTROBAN) 2 % nasal ointment, , Each Nare, BID, Kevin Tobias MD, 10 application at 07/19/18 2042  •  niCARdipine (CARDENE) 25 mg/250 mL (0.1 mg/mL) 0.9% NS VTB infusion, 5-15 mg/hr, Intravenous, Continuous PRN, Kevin Tobias MD  •  nitroglycerin 50 mg/250 mL (0.2 mg/mL) infusion, 5-200 mcg/min, Intravenous, Titrated, Kevin Tobias MD  •  norepinephrine (LEVOPHED) 8 mg/250 mL (32 mcg/mL) in sodium chloride 0.9% infusion (premix), 0.02-0.3 mcg/kg/min, Intravenous, Continuous PRN, Kevin Tobias MD  •  ondansetron (ZOFRAN) injection 4 mg, 4 mg, Intravenous, Q6H PRN, Kevin Tobias MD  •  oxyCODONE (ROXICODONE) immediate release tablet 10 mg, 10 mg, Oral, Q4H PRN, Kevin Tobias MD, 10 mg at 07/19/18 1530  •  pantoprazole (PROTONIX) EC tablet 40 mg, 40 mg, Oral, Daily, Kevin Tobias MD  •  phenylephrine (SARA-SYNEPHRINE) 50 mg in sodium chloride 0.9 % 250 mL (0.2  mg/mL) infusion, 0.2-3 mcg/kg/min, Intravenous, Continuous PRN, Kevin Tobias MD, Stopped at 07/19/18 1540  •  potassium chloride (MICRO-K) CR capsule 40 mEq, 40 mEq, Oral, PRN **OR** potassium chloride (KLOR-CON) packet 40 mEq, 40 mEq, Oral, PRN, Kevin Tobias MD  •  potassium chloride 10 mEq in 100 mL IVPB, 10 mEq, Intravenous, Q1H PRN **OR** potassium chloride 10 mEq in 100 mL IVPB, 10 mEq, Intravenous, Q1H PRN, Kevin Tobias MD  •  potassium chloride 20 mEq in 50 mL IVPB, 20 mEq, Intravenous, Q1H PRN, Kevin Tobias MD  •  potassium chloride 20 mEq in 50 mL IVPB, 20 mEq, Intravenous, Q1H PRN, Kevin Tobias MD, Last Rate: 50 mL/hr at 07/19/18 1238, 20 mEq at 07/19/18 1238  •  potassium chloride 20 mEq in 50 mL IVPB, 20 mEq, Intravenous, Q1H PRN, Kevin Tobias MD  •  promethazine (PHENERGAN) tablet 12.5 mg, 12.5 mg, Oral, Q6H PRN **OR** promethazine (PHENERGAN) injection 12.5 mg, 12.5 mg, Intravenous, Q6H PRN, Kevin Tobias MD  •  propofol (DIPRIVAN) infusion 10 mg/mL 100 mL, 5-50 mcg/kg/min, Intravenous, Continuous PRN, Kevin Tobias MD, Stopped at 07/19/18 1400  •  QUEtiapine (SEROquel) tablet 300 mg, 300 mg, Oral, Nightly, Kevin Tobias MD, 300 mg at 07/19/18 2048  •  sennosides-docusate sodium (SENOKOT-S) 8.6-50 MG tablet 2 tablet, 2 tablet, Oral, Nightly, Kevin Tobias MD  •  sodium chloride 0.9 % flush 30 mL, 30 mL, Intravenous, Once PRN, Kevin Tobias MD  •  sodium chloride 0.9 % infusion, 30 mL/hr, Intravenous, Continuous, Kevin Tobias MD, Last Rate: 30 mL/hr at 07/19/18 1229, 30 mL/hr at 07/19/18 1229  •  sodium chloride 0.9 % infusion, 30 mL/hr, Intravenous, Continuous PRN, Kevin Tobias MD, Last Rate: 30 mL/hr at 07/19/18 1229, 30 mL/hr at 07/19/18 1229  •  topiramate (TOPAMAX) tablet 300 mg, 300 mg, Oral, Nightly, Kevin Tobias MD, 300 mg at 07/19/18 2049  •  vasopressin (PITRESSIN) 20 Units in sodium chloride 0.9 % 100 mL (0.2 Units/mL) infusion, 0.02-0.1  Units/min, Intravenous, Continuous PRN, Kevin Tobias MD  •  Vortioxetine HBr 5 MG 5 mg, 5 mg, Oral, Daily With Breakfast, Kevin Tobias MD         Review of Systems   Unable to perform ROS: Acuity of condition   All other systems reviewed and are negative.    Objective     Vital Signs  Temp:  [98.4 °F (36.9 °C)] 98.4 °F (36.9 °C)  Heart Rate:  [67-81] 79  Resp:  [8-24] 22  BP: ()/(51-73) 119/73  Arterial Line BP: ()/(50-63) 128/63  FiO2 (%):  [40 %-100 %] 40 %    Physical Exam  Physical Exam   Constitutional: She is oriented to person, place, and time.   Eyes: Pupils are equal, round, and reactive to light. EOM are normal.   Neck: Normal range of motion. Neck supple.   Cardiovascular: Normal rate, regular rhythm, normal heart sounds and intact distal pulses.    Pulmonary/Chest: Effort normal and breath sounds normal.   Abdominal: Soft. Bowel sounds are normal.   Musculoskeletal: Normal range of motion. She exhibits no edema.   Neurological: She is alert and oriented to person, place, and time.   Skin: Skin is warm and dry.   Psychiatric: She has a normal mood and affect. Her behavior is normal.   Nursing note and vitals reviewed.      Results Review:    I reviewed the patient's new clinical results.  Glucose   Date/Time Value Ref Range Status   07/20/2018 0504 112 70 - 130 mg/dL Final   07/20/2018 0358 117 70 - 130 mg/dL Final   07/20/2018 0303 102 70 - 130 mg/dL Final   07/20/2018 0203 95 70 - 130 mg/dL Final   07/20/2018 0103 83 70 - 130 mg/dL Final   07/20/2018 0003 89 70 - 130 mg/dL Final   07/19/2018 2303 110 70 - 130 mg/dL Final   07/19/2018 2200 127 70 - 130 mg/dL Final   07/19/2018 2106 130 70 - 130 mg/dL Final   07/19/2018 2006 135 (H) 70 - 130 mg/dL Final   07/19/2018 1838 151 (H) 70 - 130 mg/dL Final   07/19/2018 1702 160 (H) 70 - 130 mg/dL Final     Lab Results (last 72 hours)     Procedure Component Value Units Date/Time    POC Glucose Once [[id number]]  (Normal) Collected:  07/20/18  0504    Specimen:  Blood Updated:  07/20/18 0506     Glucose 112 mg/dL     Narrative:       Meter: [id number] : [id number] Elena Noriega RN    POC Glucose Once [[id number]]  (Normal) Collected:  07/20/18 0358    Specimen:  Blood Updated:  07/20/18 0400     Glucose 117 mg/dL     Narrative:       Meter: [id number] : [id number] Elena Noriega RN    Renal Function Panel [[id number]]  (Abnormal) Collected:  07/20/18 0300    Specimen:  Blood Updated:  07/20/18 0351     Glucose 99 mg/dL      BUN 16 mg/dL      Creatinine 1.07 (H) mg/dL      Sodium 142 mmol/L      Potassium 3.7 mmol/L      Chloride 108 (H) mmol/L      CO2 22.0 mmol/L      Calcium 8.4 (L) mg/dL      Albumin 3.60 g/dL      Phosphorus 4.0 mg/dL      Anion Gap 12.0 mmol/L      BUN/Creatinine Ratio 15.0     eGFR Non African Amer 54 (L) mL/min/1.73     Magnesium [[id number]]  (Normal) Collected:  07/20/18 0300    Specimen:  Blood Updated:  07/20/18 0342     Magnesium 2.6 mg/dL     Protime-INR [[id number]]  (Abnormal) Collected:  07/20/18 0300    Specimen:  Blood Updated:  07/20/18 0331     Protime 16.5 (H) Seconds      INR 1.36 (H)    CBC & Differential [[id number]] Collected:  07/20/18 0300    Specimen:  Blood Updated:  07/20/18 0324    Narrative:       The following orders were created for panel order CBC & Differential.  Procedure                               Abnormality         Status                     ---------                               -----------         ------                     CBC Auto Differential[[id number]]        Abnormal            Final result                 Please view results for these tests on the individual orders.    CBC Auto Differential [[id number]]  (Abnormal) Collected:  07/20/18 0300    Specimen:  Blood Updated:  07/20/18 0324     WBC 11.21 (H) 10*3/mm3      RBC 3.65 (L) 10*6/mm3      Hemoglobin 10.9 (L) g/dL      Hematocrit 34.3 (L) %      MCV 94.0 fL      MCH 29.9 pg      MCHC 31.8 (L) g/dL      RDW 13.9 (H) %      RDW-SD  47.7 fl      MPV 11.9 fL      Platelets 84 (L) 10*3/mm3      Neutrophil % 74.9 %      Lymphocyte % 16.1 (L) %      Monocyte % 7.8 %      Eosinophil % 0.8 %      Basophil % 0.2 %      Immature Grans % 0.2 %      Neutrophils, Absolute 8.40 (H) 10*3/mm3      Lymphocytes, Absolute 1.81 10*3/mm3      Monocytes, Absolute 0.87 10*3/mm3      Eosinophils, Absolute 0.09 10*3/mm3      Basophils, Absolute 0.02 10*3/mm3      Immature Grans, Absolute 0.02 10*3/mm3     POC Glucose Once [[id number]]  (Normal) Collected:  07/20/18 0303    Specimen:  Blood Updated:  07/20/18 0305     Glucose 102 mg/dL     Narrative:       Meter: [id number] : [id number] Enz Hari RN    POC Glucose Once [[id number]]  (Normal) Collected:  07/20/18 0203    Specimen:  Blood Updated:  07/20/18 0205     Glucose 95 mg/dL     Narrative:       Meter: [id number] : [id number] Enz Bertramer RN    POC Glucose Once [[id number]]  (Normal) Collected:  07/20/18 0103    Specimen:  Blood Updated:  07/20/18 0104     Glucose 83 mg/dL     Narrative:       Meter: [id number] : [id number] Enz Bertramer RN    POC Glucose Once [[id number]]  (Normal) Collected:  07/20/18 0003    Specimen:  Blood Updated:  07/20/18 0007     Glucose 89 mg/dL     Narrative:       Meter: [id number] : [id number] Enz Ricopher RN    POC Glucose Once [[id number]]  (Normal) Collected:  07/19/18 2303    Specimen:  Blood Updated:  07/19/18 2306     Glucose 110 mg/dL     Narrative:       Meter: [id number] : [id number] Enz Christopher RN    POC Glucose Once [[id number]]  (Normal) Collected:  07/19/18 2200    Specimen:  Blood Updated:  07/19/18 2202     Glucose 127 mg/dL     Narrative:       Meter: [id number] : [id number] Enz Ricopher RN    POC Glucose Once [[id number]]  (Normal) Collected:  07/19/18 2106    Specimen:  Blood Updated:  07/19/18 2108     Glucose 130 mg/dL     Narrative:       Meter: [id number] : [id number] Elena Noriega RN    Potassium [[id number]]   (Normal) Collected:  07/19/18 2035    Specimen:  Blood Updated:  07/19/18 2101     Potassium 3.7 mmol/L     POC Glucose Once [[id number]]  (Abnormal) Collected:  07/19/18 2006    Specimen:  Blood Updated:  07/19/18 2029     Glucose 135 (H) mg/dL     Narrative:       Meter: [id number] : [id number] Elena Noriega RN    POC Glucose Once [[id number]]  (Abnormal) Collected:  07/19/18 1838    Specimen:  Blood Updated:  07/19/18 1849     Glucose 151 (H) mg/dL     Narrative:       Meter: [id number] : [id number] José Luis Graves RN    POC Glucose Once [[id number]]  (Abnormal) Collected:  07/19/18 1702    Specimen:  Blood Updated:  07/19/18 1713     Glucose 160 (H) mg/dL     Narrative:       Meter: [id number] : [id number] José Luis Graves RN    POC Glucose Once [[id number]]  (Abnormal) Collected:  07/19/18 1559    Specimen:  Blood Updated:  07/19/18 1609     Glucose 163 (H) mg/dL     Narrative:       Meter: [id number] : [id number] José Luis Graves RN    Renal Function Panel [[id number]]  (Abnormal) Collected:  07/19/18 1506    Specimen:  Blood Updated:  07/19/18 1550     Glucose 168 (H) mg/dL      BUN 13 mg/dL      Creatinine 1.26 (H) mg/dL      Sodium 141 mmol/L      Potassium 3.8 mmol/L      Chloride 106 mmol/L      CO2 23.2 mmol/L      Calcium 8.4 (L) mg/dL      Albumin 4.50 g/dL      Phosphorus 1.2 (L) mg/dL      Anion Gap 11.8 mmol/L      BUN/Creatinine Ratio 10.3     eGFR Non African Amer 45 (L) mL/min/1.73     Magnesium [[id number]]  (Normal) Collected:  07/19/18 1506    Specimen:  Blood Updated:  07/19/18 1548     Magnesium 2.4 mg/dL     CBC (No Diff) [[id number]]  (Abnormal) Collected:  07/19/18 1506    Specimen:  Blood Updated:  07/19/18 1526     WBC 14.62 (H) 10*3/mm3      RBC 3.89 (L) 10*6/mm3      Hemoglobin 11.5 (L) g/dL      Hematocrit 36.2 %      MCV 93.1 fL      MCH 29.6 pg      MCHC 31.8 (L) g/dL      RDW 13.8 (H) %      RDW-SD 46.7 fl      MPV 11.5 fL      Platelets 101 (L) 10*3/mm3     POC Glucose Once [[id number]]   (Abnormal) Collected:  07/19/18 1456    Specimen:  Blood Updated:  07/19/18 1517     Glucose 181 (H) mg/dL     Narrative:       Meter: [id number] : [id number] José Luis Graves RN    Blood Gas, Arterial [[id number]]  (Abnormal) Collected:  07/19/18 1509    Specimen:  Arterial Blood Updated:  07/19/18 1515     Site Arterial Line     Dale's Test N/A     pH, Arterial 7.298 (C) pH units      Comment: Critical:Notify RN alfonso nieto (19-Jul-18 15:11:31)Read back ok        pCO2, Arterial 50.1 (H) mm Hg      pO2, Arterial 137.0 (H) mm Hg      HCO3, Arterial 24.6 mmol/L      Base Excess, Arterial -2.3 (L) mmol/L      O2 Saturation Calculated 98.8 %      A-a Gradiant 0.6 mmHg      Barometric Pressure for Blood Gas 750.9 mmHg      Modality Adult Vent     FIO2 40 %      Ventilator Mode PS     Set Tidal Volume 400     Set Mech Resp Rate 20     PEEP 5    Narrative:       Meter: [id number] : [id number] Graf Prajapati    POC Glucose Once [[id number]]  (Abnormal) Collected:  07/19/18 1405    Specimen:  Blood Updated:  07/19/18 1416     Glucose 188 (H) mg/dL     Narrative:       Meter: [id number] : [id number] José Luis Graves RN    POC Glucose Once [[id number]]  (Abnormal) Collected:  07/19/18 1313    Specimen:  Blood Updated:  07/19/18 1334     Glucose 215 (H) mg/dL     Narrative:       Meter: [id number] : [id number] José Luis Graves RN    Protime-INR [[id number]]  (Abnormal) Collected:  07/19/18 1141    Specimen:  Blood Updated:  07/19/18 1242     Protime 17.9 (H) Seconds      INR 1.51 (H)    aPTT [[id number]]  (Normal) Collected:  07/19/18 1141    Specimen:  Blood Updated:  07/19/18 1242     PTT 34.0 seconds     Fibrinogen [[id number]]  (Normal) Collected:  07/19/18 1141    Specimen:  Blood Updated:  07/19/18 1242     Fibrinogen 219 mg/dL     Renal Function Panel [[id number]]  (Abnormal) Collected:  07/19/18 1141    Specimen:  Blood Updated:  07/19/18 1218     Glucose 211 (H) mg/dL      BUN 14 mg/dL      Creatinine 1.24 (H) mg/dL       Sodium 139 mmol/L      Potassium 3.6 mmol/L      Chloride 104 mmol/L      CO2 21.2 (L) mmol/L      Calcium 8.9 mg/dL      Albumin 3.80 g/dL      Phosphorus 3.4 mg/dL      Anion Gap 13.8 mmol/L      BUN/Creatinine Ratio 11.3     eGFR Non African Amer 46 (L) mL/min/1.73     Calcium, Ionized [[id number]]  (Normal) Collected:  07/19/18 1141    Specimen:  Blood Updated:  07/19/18 1216     Ionized Calcium 1.27 mmol/L      Ionized Calcium 5.1 mg/dL     Blood Gas, Arterial [[id number]]  (Abnormal) Collected:  07/19/18 1212    Specimen:  Arterial Blood Updated:  07/19/18 1215     Site Arterial Line     Dale's Test N/A     pH, Arterial 7.298 (C) pH units      Comment: Critical:Notify ISHAN nieto (19-Jul-18 12:14:38)Read back ok        pCO2, Arterial 48.4 (H) mm Hg      pO2, Arterial 453.8 (H) mm Hg      HCO3, Arterial 23.7 mmol/L      Base Excess, Arterial -3.1 (L) mmol/L      O2 Saturation Calculated 100.0 (H) %      A-a Gradiant 0.7 mmHg      Barometric Pressure for Blood Gas 751.7 mmHg      Modality Adult Vent     FIO2 100 %      Ventilator Mode VC     Set Tidal Volume 550     Set Mech Resp Rate 14     Rate 14 Breaths/minute      PEEP 7.5    Narrative:       Meter: [id number] : [id number] Graf Prajapati    Magnesium [[id number]]  (Normal) Collected:  07/19/18 1141    Specimen:  Blood Updated:  07/19/18 1213     Magnesium 2.6 mg/dL     POC Glucose Once [[id number]]  (Abnormal) Collected:  07/19/18 1141    Specimen:  Blood Updated:  07/19/18 1151     Glucose 219 (H) mg/dL     Narrative:       Meter: [id number] : [id number] José Luis Graves RN    CBC & Differential [[id number]] Collected:  07/19/18 1141    Specimen:  Blood Updated:  07/19/18 1150    Narrative:       The following orders were created for panel order CBC & Differential.  Procedure                               Abnormality         Status                     ---------                               -----------         ------                     CBC Auto  Differential[[id number]]        Abnormal            Final result                 Please view results for these tests on the individual orders.    CBC Auto Differential [[id number]]  (Abnormal) Collected:  07/19/18 1141    Specimen:  Blood Updated:  07/19/18 1150     WBC 10.70 10*3/mm3      RBC 3.80 (L) 10*6/mm3      Hemoglobin 11.4 (L) g/dL      Hematocrit 35.4 (L) %      MCV 93.2 fL      MCH 30.0 pg      MCHC 32.2 (L) g/dL      RDW 13.7 (H) %      RDW-SD 46.3 fl      MPV 11.0 fL      Platelets 115 (L) 10*3/mm3      Neutrophil % 85.9 (H) %      Lymphocyte % 11.9 (L) %      Monocyte % 0.6 (L) %      Eosinophil % 0.7 %      Basophil % 0.4 %      Immature Grans % 0.5 %      Neutrophils, Absolute 9.20 (H) 10*3/mm3      Lymphocytes, Absolute 1.27 10*3/mm3      Monocytes, Absolute 0.06 (L) 10*3/mm3      Eosinophils, Absolute 0.08 10*3/mm3      Basophils, Absolute 0.04 10*3/mm3      Immature Grans, Absolute 0.05 (H) 10*3/mm3     POC Activated Clotting Time [[id number]]  (Normal) Collected:  07/19/18 1041    Specimen:  Blood Updated:  07/19/18 1058     Activated Clotting Time  131 Seconds      Comment: Serial Number: [id number]:  [id number]       POC Activated Clotting Time [[id number]]  (Normal) Collected:  07/19/18 1036    Specimen:  Blood Updated:  07/19/18 1058     Activated Clotting Time  136 Seconds      Comment: Serial Number: [id number]:  [id number]       POC Activated Clotting Time [[id number]]  (Abnormal) Collected:  07/19/18 0940    Specimen:  Blood Updated:  07/19/18 1057     Activated Clotting Time  472 (H) Seconds      Comment: Serial Number: [id number]:  [id number]       POC Activated Clotting Time [[id number]]  (Abnormal) Collected:  07/19/18 0910    Specimen:  Blood Updated:  07/19/18 1057     Activated Clotting Time  477 (H) Seconds      Comment: Serial Number: [id number]:  0808       POC Activated Clotting Time [[id number]]  (Abnormal) Collected:  07/19/18 0848    Specimen:  Blood Updated:  07/19/18 1057      Activated Clotting Time  389 (H) Seconds      Comment: Serial Number: [id number]:  [id number]       POC Activated Clotting Time [[id number]]  (Abnormal) Collected:  07/19/18 0820    Specimen:  Blood Updated:  07/19/18 1057     Activated Clotting Time  516 (H) Seconds      Comment: Serial Number: [id number]:  0808       POC Activated Clotting Time [[id number]]  (Normal) Collected:  07/19/18 0734    Specimen:  Blood Updated:  07/19/18 1056     Activated Clotting Time  131 Seconds      Comment: Serial Number: [id number]:  [id number]       POC Glucose Once [[id number]]  (Abnormal) Collected:  07/19/18 0514    Specimen:  Blood Updated:  07/19/18 0515     Glucose 220 (H) mg/dL     Narrative:       Meter: [id number] : [id number] Goldie GOLDSMITH          Imaging Results (last 72 hours)     Procedure Component Value Units Date/Time    XR Chest 1 View [[id number]] Collected:  07/20/18 0718     Updated:  07/20/18 0718    Narrative:       PORTABLE CHEST X-RAY     CLINICAL HISTORY: Post-Op Heart Surgery; I25.10-Atherosclerotic heart  disease of native coronary artery without angina pectoris;  I34.0-Nonrheumatic mitral (valve) insufficiency.     COMPARISON: 07/19/2018.     FINDINGS: Portable AP view of the chest was obtained with overlying  monitor leads in place. ET tube has been removed. The other lines are  unchanged. There is mild vascular congestion. No active airspace disease  or significant pleural fluid. Heart size normal considering technique.             Impression:       Interval extubation with development of mild vascular  congestion.                XR Chest 1 View [[id number]] Collected:  07/19/18 1236     Updated:  07/19/18 1241    Narrative:       XR CHEST 1 VW-     HISTORY: Female who is 53 years-old,  postsurgical evaluation     TECHNIQUE: Frontal view of the chest     COMPARISON: 7/18/2018     FINDINGS: Endotracheal tube tip projects 2.7 cm above the mónica. Right  IJ Sherman-Lasha catheter extends to the right main  pulmonary artery.  Sternotomy wires are noted. Cardiac valve marker is evident. Left chest  tube is noted. Heart, mediastinum and pulmonary vasculature are  unremarkable. Small likely atelectasis in the lower lungs. No pleural  effusion, or pneumothorax. No acute osseous process.       Impression:       Postsurgical changes.     This report was finalized on 7/19/2018 12:38 PM by Dr. Brian Guan M.D.             Assessment/Plan     Patient Active Problem List    Diagnosis   • Mitral valve insufficiency [I34.0]     Overview Note:     Added automatically from request for surgery [id number]     • Coronary artery disease involving native heart without angina pectoris [I25.10]     Overview Note:     Added automatically from request for surgery [id number]     • Coronary artery disease involving native coronary artery of native heart with angina pectoris (CMS/[facility]) [I25.119]   • Severe mitral regurgitation [I34.0]   Uncontrolled type 2 diabetes mellitus  Uncontrolled type 2 diabetes mellitus with neuropathy  Insulin pump status  Hypoglycemia  Abnormal weight gain  Hyporthyroidism  Status post a CABG postoperative day 1    Insulin pump was disconnected and was taken home and the patient  Patient reported that since starting insulin.  She improved significantly  Her last hemoglobin A1c was in the 8% range    Patient is currently nothing by mouth and we started clear liquids soon  I will start basal insulin and sliding scale  Will continue to monitor the Accu-Cheks and then adjust insulin as needed  Once the patient starts eating she will probably need mealtime essentially treated as well    The total time spent for old record and lab review and floor time was more than 110 min of which greater than 60 min of time ( greater than 50% of the total time )  was spent face to face with the patient counseling and coordination of care on recommended evaluation and treatment options, instructions for management/treatment and /or  "follow up  and importance of compliance with chosen management or treatment options    Delio Watts MD FACE.  07/20/18  7:53 AM        EMR Dragon / transcription disclaimer:     \"Dictated utilizing Dragon dictation\".   "

## 2018-07-20 NOTE — CONSULTS
Patient examined chart reviewed  Patient is a known diabetic for more than 16 years of which she had been on pump therapy for nearly 8 years  She's currently using Medtronic 670 G pump at home  And she does not use CGM  Pump is not at the bedside  Her average daily dosage is about 43 units  Patient does have symptoms suggestive of diabetic peripheral neuropathy  She denied any knowledge of diabetic retinopathy or nephropathy  Her recent hemoglobin A1c 6.7%.  She reported that it used to be 14% prior to pump therapy    Patient is status post coronary artery bypass grafting postoperative day 1  She just started [place]  She is off the insulin drip since her blood sugars are in the 112 range    Patient will be advanced to clear liquids followed by full liquids today as per the nurse  I will schedule basal insulin every 12 hours along with sliding scale every 6 hours or before meals at bedtime  Once the patient is more comfortable in a day or 2 she may restart insulin pump therapy  Patient verbalized understanding

## 2018-07-20 NOTE — PLAN OF CARE
Problem: Patient Care Overview  Goal: Plan of Care Review   07/20/18 0905   Coping/Psychosocial   Plan of Care Reviewed With patient   Plan of Care Review   Progress improving   OTHER   Outcome Summary Pt presents s/p CABG x4 and MVR resulting in decreased independence and safety w mobility. Due to the above pt will benifit from skilled PT to address mobility deficits and increase independent and safe mobility.

## 2018-07-20 NOTE — CONSULTS
Date of Consultation: 18    Referral Provider: Kevin Tobias MD     Reason for Consultation: Post-op cardiac care, CAD, MR    Encounter Provider: Jesus James MD    Group of Service: [facility]     Patient Name: Angeles Oquendo    :1965    Chief complaint:  Shortness of breath    History of Present Illness:  Ms. Oquendo is a 54 y/o with a history of rheumatoid arthritis, DM, HTN, hypothryodism, bipolar disorder, CARLTON, fibromyalgia, GERD, asthma and CKD. She quit smoking in March of this year. She was scheduled for a hysterectomy in 3/18 when her preop EKG showed a questionable old infarct. A stress test was done which confirmed this. She also had a transthoracic echocardiogram that showed severe MR and moderate mitral stenosis. A LAURO was then done which confirmed the severe MR. On 18 she had a cardiac catheterization which showed an EF of 30-35%, CO 3.58, CI 1.76, wedge 13, DANY 16 and 3 vessel disease. She was admitted on 18 post CABG x 4 with LIMA-LAD and SVGs to PDA-PLV and diagonal 2, MV repair with a 26 mm Physio II ring annuloplasty and posterior-medial commisuroplasty.     We have been asked to see for post-op cardiac care. She is extubated and vitals are stable, off all drips. This am labs are unremarkable.  She is in sinus rhythm.  Other than post-op pain, she has no complaints this AM.        Cardiac Testing:  Cardiac Catheterization 18  Hemodynamics:  1.  Cardiac output (Ranulfo): 3.58 L/m  2.  Neck index ([place]fo) 1.76 L/m/m²  3.  Right atrium: A wave 5, V wave 6, mean 4  4.  Right ventricle: 23/3  5.  Pulmonary artery: 23/12, mean 16  6.  PCW: A wave 15, V-wave 15, mean 13  7.  Aorta: 130/72, mean 91  8.  Left ventricle: 130/13     Cineangiography:  1.  Left main: Normal  2.  LAD: The proximal segment of the LAD is normal.  The artery tapers to 75-80% stenosis at the origin of a large diagonal branch.  The distal portion of the vessel is normal.  The  diagonal branch shows evidence of plaquing in the proximal segment with a 20% stenosis.  There is a 30-40% distal stenosis in the diagonal as well.  3.  LCx: The left circumflex coronary artery is normal in the proximal segment.  The artery tapers to a 50% stenosis in the midsegment.  The terminal portion is normal.  The first marginal branch arising from the mid segment is normal.  The second marginal branch arising from the mid segment is small and normal.  The third marginal branch arising from the distal segment is small and normal  4.  RCA: The right coronary artery is essentially occluded with recanalization in an antegrade fashion.  There are also collaterals from the left system as well.  The PDA seems to be a large vessel that is normal.  The posterolateral branch is large and normal as well.     Left ventriculography: Overall size of the ventricle is enlarged.  Contractility is abnormal with inferior and inferobasilar akinesia.  Estimated ejection fraction is 30-35%.  Severe mitral regurgitation is noted.    LAURO 6/28/18  · The left ventricular cavity is moderately dilated.  · There is moderate hypokinesis of the inferior wall. There is severe hypokinesis of the basal to mid inferoseptum  · Left ventricular systolic function is mildly decreased. Estimated EF = 45%.  · Left ventricular diastolic dysfunction is noted (grade II w/high LAP) consistent with pseudonormalization.  · Left atrial cavity size is moderately dilated  · The mitral valve leaflets are thickened at the tips. The posterior mitral valve leaflet is severely restricted in motion.  · There is severe mitral regurgitation that essentially fills the entire left atrium. There is flow reversal in 2 of the 4 pulmonary veins  · Mild tricuspid valve regurgitation is present.  · Calculated right ventricular systolic pressure from tricuspid regurgitation is 29 mmHg.  · There are mild plaques in the ascending aorta and aortic arch..  · There is no  evidence of pericardial effusion.  Past Medical History:   Diagnosis Date   • Anesthesia complication     WITH LAURO TOOK A  LOT TO GET HER TO SLEEP   • Asthma    • Bipolar I disorder, single manic episode (CMS/[facility])    • Bursitis     LEFT ELBOW   • Chronic pain syndrome     SEES DR GODWIN NOLASCO FOR PAIN   • Coronary artery disease    • Diabetes mellitus (CMS/[facility])     TYPE 2. HAS INSULIN PUMP WITH CONT BASAL RATE   • Disc degeneration, lumbar    • Esophageal reflux    • Fibromyalgia    • H/O seasonal allergies    • History of sleep apnea      SLEEP STUDY. HAD MACHINE THEN. USED 3YRS. SLEEP STUDY REPEATED. WAS OKED NOT TO USE MACHING ANY MORE   • Hyperlipidemia    • Hypertension    • Hypothyroidism    • Mitral regurgitation    • PCOS (polycystic ovarian syndrome)    • Rheumatoid arthritis (CMS/[facility])    • Statin intolerance     Severe myalgias    • Vitamin D deficiency          Past Surgical History:   Procedure Laterality Date   • CARDIAC CATHETERIZATION N/A 2018    Procedure: Right and Left Heart Cath and cors;  Surgeon: Stalin Handy MD;  Location:  [facility];  Service: Cardiovascular   • CARDIAC CATHETERIZATION N/A 2018    Procedure: Coronary angiography;  Surgeon: Stalin Handy MD;  Location:  [facility];  Service: Cardiovascular   • CARDIAC CATHETERIZATION N/A 2018    Procedure: Left ventriculography;  Surgeon: Stalin Handy MD;  Location:  [facility]ATH INVAS[facility] LOCATION;  Service: Cardiovascular   •  SECTION      X2   • ENDOMETRIAL ABLATION      X2   • TONSILLECTOMY     • UTERINE FIBROID SURGERY      Ablasion         Allergies   Allergen Reactions   • Statins Myalgia   • Sulfa Antibiotics Rash         No current facility-administered medications on file prior to encounter.      Current Outpatient Prescriptions on File Prior to Encounter   Medication Sig Dispense Refill   • buPROPion XL (WELLBUTRIN XL) 150 MG 24 hr tablet Take 450 mg by  mouth Daily.     • carvedilol (COREG) 3.125 MG tablet Take 1 tablet by mouth Every 12 (Twelve) Hours. 60 tablet 1   • clonazePAM (KlonoPIN) 1 MG tablet Take 1 mg by mouth 4 (Four) Times a Day As Needed for Anxiety.     • fexofenadine (ALLEGRA) 180 MG tablet Take 180 mg by mouth Daily.     • gabapentin (NEURONTIN) 600 MG tablet Take 600 mg by mouth 3 (Three) Times a Day.     • HYDROcodone-acetaminophen (NORCO)  MG per tablet Take 1 tablet by mouth Every 6 (Six) Hours As Needed for Moderate Pain .     • insulin lispro (HumaLOG) 100 UNIT/ML injection Inject  under the skin. 2 UNITS ON 4 CARBS BASAL RATE OF 3 PER HOUR. VIA INSULIN PUMP     • levothyroxine (SYNTHROID, LEVOTHROID) 200 MCG tablet Take 200 mcg by mouth Daily.     • montelukast (SINGULAIR) 10 MG tablet Take 10 mg by mouth Every Night.     • olmesartan-hydrochlorothiazide (BENICAR HCT) 40-12.5 MG per tablet Take 1 tablet by mouth Daily.     • Vortioxetine HBr (TRINTELLIX) 5 MG tablet Take 5 mg by mouth Daily With Breakfast.     • QUEtiapine (SEROquel) 300 MG tablet Take 200 mg by mouth Every Night.     • topiramate (TOPAMAX) 100 MG tablet Take 300 mg by mouth Every Night.     • zolpidem (AMBIEN) 10 MG tablet Take 10 mg by mouth At Night As Needed for Sleep.           Social History     Social History   • Marital status:      Spouse name: N/A   • Number of children: N/A   • Years of education: N/A     Occupational History   • Not on file.     Social History Main Topics   • Smoking status: Former Smoker     Packs/day: 1.00     Years: 30.00     Types: Cigarettes     Quit date: 3/28/2018   • Smokeless tobacco: Never Used   • Alcohol use No   • Drug use: No   • Sexual activity: Not on file     Other Topics Concern   • Not on file     Social History Narrative   • No narrative on file         Family History   Problem Relation Age of Onset   • Anemia Other    • Arthritis Other    • Asthma Other    • Depression Other         with anxiety   • Diabetes  "Other    • Hypertension Other    • Allergies Other    • Heart disease Other    • Diabetes Father    • Stroke Paternal Grandmother    • Heart attack Paternal Grandfather    • Diabetes Paternal Grandfather    • Malig Hyperthermia Neg Hx        REVIEW OF SYSTEMS:   Pertinent positives are noted in the history of present illness above.  Otherwise, all other systems were reviewed, and are negative.     Objective:     Vitals:    07/20/18 0300 07/20/18 0400 07/20/18 0500 07/20/18 0600   BP: 100/60 98/62 117/71 119/73   BP Location: Right arm Right arm Right arm Left arm   Patient Position: Lying Lying Lying Sitting   Pulse: 70 70 77 79   Resp: 8 20 24 22   Temp:       TempSrc:       SpO2: 99% 99% 99% 96%   Weight:       Height:         Body mass index is 36.8 kg/m².  Flowsheet Rows      First Filed Value   Admission Height  162.6 cm (64.02\") Documented at 07/19/2018 0539   Admission Weight  97.3 kg (214 lb 9 oz) Documented at 07/19/2018 0539           General:    No acute distress, alert and oriented x4, pleasant                   Head:    Normocephalic, atraumatic.   Eyes:          Conjunctivae and sclerae normal, no icterus, PERRLA   Throat:   No oral lesions, no thrush, oral mucosa moist.    Neck:   Supple, trachea midline.   Lungs:     Clear to auscultation bilaterally     Heart:    Regular rhythm and normal rate.  No murmurs, gallops, or rubs noted.   Abdomen:     Soft, non-tender, non-distended, positive bowel sounds.    Extremities:   Trace edema.     Pulses:   Pulses palpable and equal bilaterally.    Skin:   No bleeding or rash.   Neuro:   Non-focal.  Moves all extremities well.    Psychiatric:   Normal mood and affect.       Lab Review:                  Results from last 7 days  Lab Units 07/20/18  0300   SODIUM mmol/L 142   POTASSIUM mmol/L 3.7   CHLORIDE mmol/L 108*   CO2 mmol/L 22.0   BUN mg/dL 16   CREATININE mg/dL 1.07*   GLUCOSE mg/dL 99   CALCIUM mg/dL 8.4*           Results from last 7 days  Lab Units " 07/20/18  0300   WBC 10*3/mm3 11.21*   HEMOGLOBIN g/dL 10.9*   HEMATOCRIT % 34.3*   PLATELETS 10*3/mm3 84*       Results from last 7 days  Lab Units 07/20/18  0300 07/19/18  1141 07/18/18  0836   INR  1.36* 1.51* 1.05   APTT seconds  --  34.0 30.4       Results from last 7 days  Lab Units 07/18/18  0836   CHOLESTEROL mg/dL 170       Results from last 7 days  Lab Units 07/20/18  0300   MAGNESIUM mg/dL 2.6       Results from last 7 days  Lab Units 07/18/18  0836   CHOLESTEROL mg/dL 170   TRIGLYCERIDES mg/dL 125   HDL CHOL mg/dL 46   LDL CHOL mg/dL 99         EKG (reviewed by me personally):      Assessment:   1. 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 Coronary artery disease   3. Status post 4 vessel CABG and mitral valve repair 7/19/2018  4. Pre-op EF 45%  5. Diabetes - on home insulin pump  6. Rheumatoid arthritis  7. Post-op anemia (expected)  8. Post-op thrombocytopenia     Plan:       The patient seems to be doing well at this point postoperatively.  She is extubated and off of all drips.  She is maintaining sinus rhythm.  She will eventually need an echocardiogram to reevaluate her ejection fraction and mitral valve repair.  Endocrinology is managing her insulin currently.  She is on aspirin and metoprolol.  She has been statin intolerant in the past, and is not on a statin medication for this reason.  We will continue routine postoperative care.    Chino James M.D.

## 2018-07-20 NOTE — PROGRESS NOTES
" LOS: 1 day   Patient Care Team:  Charlotte Johnson MD as PCP - General  Charlotte Johnson MD as PCP - Family Medicine  Janeth Young MD as Consulting Physician (Endocrinology)  Jim Schafer MD as Consulting Physician (Rheumatology)  Hari Merritt MD as Consulting Physician (Pain Medicine)  Cesar Remy MD as Consulting Physician (Psychiatry)  Jesus James MD as Consulting Physician (Cardiology)    Chief Complaint: post op fu    Subjective:  Symptoms:  No shortness of breath or chest pain.    Diet:  No nausea or vomiting.    Pain:  Pain is requiring pain medication.          Vital Signs  Temp:  [98.4 °F (36.9 °C)] 98.4 °F (36.9 °C)  Heart Rate:  [67-81] 79  Resp:  [8-24] 22  BP: ()/(51-73) 119/73  Arterial Line BP: ()/(50-63) 128/63  FiO2 (%):  [40 %-100 %] 40 %  Body mass index is 36.8 kg/m².    Intake/Output Summary (Last 24 hours) at 07/20/18 0826  Last data filed at 07/20/18 0600   Gross per 24 hour   Intake           2630.5 ml   Output             4700 ml   Net          -2069.5 ml     No intake/output data recorded.    Chest tube drainage last 8 hours: 142    1    07/19/18  1235 07/19/18  1325 07/19/18  1405   Weight: 97.3 kg (214 lb 8.1 oz) 97.3 kg (214 lb 8.1 oz) 97.3 kg (214 lb 8.1 oz)         Objective:  General Appearance:  Comfortable.    Vital signs: (most recent): Blood pressure 119/73, pulse 79, temperature 98.4 °F (36.9 °C), temperature source Oral, resp. rate 22, height 162.6 cm (64.02\"), weight 97.3 kg (214 lb 8.1 oz), last menstrual period 06/28/2018, SpO2 96 %, not currently breastfeeding.    Output: Producing urine.    Lungs:  Normal effort and normal respiratory rate.  There are rales (bilateral bases ).  No decreased breath sounds, wheezes or rhonchi.    Heart: Normal rate.  Regular rhythm.  Positive for friction rub.    Abdomen: Abdomen is soft and non-distended.    Extremities: There is no dependent edema.    Pulses: Distal pulses are intact. "    Neurological: Patient is alert and oriented to person, place and time.    Skin:  Warm and dry.  (Sternal dressing, right leg ace dry and intact, no hematoma)            Results Review:        WBC WBC   Date Value Ref Range Status   07/20/2018 11.21 (H) 4.50 - 10.70 10*3/mm3 Final   07/19/2018 14.62 (H) 4.50 - 10.70 10*3/mm3 Final   07/19/2018 10.70 4.50 - 10.70 10*3/mm3 Final   07/18/2018 6.44 4.50 - 10.70 10*3/mm3 Final      HGB Hemoglobin   Date Value Ref Range Status   07/20/2018 10.9 (L) 11.9 - 15.5 g/dL Final   07/19/2018 11.5 (L) 11.9 - 15.5 g/dL Final   07/19/2018 11.4 (L) 11.9 - 15.5 g/dL Final   07/18/2018 14.5 11.9 - 15.5 g/dL Final      HCT Hematocrit   Date Value Ref Range Status   07/20/2018 34.3 (L) 35.6 - 45.5 % Final   07/19/2018 36.2 35.6 - 45.5 % Final   07/19/2018 35.4 (L) 35.6 - 45.5 % Final   07/18/2018 43.9 35.6 - 45.5 % Final      Platelets Platelets   Date Value Ref Range Status   07/20/2018 84 (L) 140 - 500 10*3/mm3 Final   07/19/2018 101 (L) 140 - 500 10*3/mm3 Final   07/19/2018 115 (L) 140 - 500 10*3/mm3 Final   07/18/2018 199 140 - 500 10*3/mm3 Final        PT/INR:    Protime   Date Value Ref Range Status   07/20/2018 16.5 (H) 11.7 - 14.2 Seconds Final   07/19/2018 17.9 (H) 11.7 - 14.2 Seconds Final   07/18/2018 13.5 11.7 - 14.2 Seconds Final   /  INR   Date Value Ref Range Status   07/20/2018 1.36 (H) 0.90 - 1.10 Final   07/19/2018 1.51 (H) 0.90 - 1.10 Final   07/18/2018 1.05 0.90 - 1.10 Final       Sodium Sodium   Date Value Ref Range Status   07/20/2018 142 136 - 145 mmol/L Final   07/19/2018 141 136 - 145 mmol/L Final   07/19/2018 139 136 - 145 mmol/L Final   07/18/2018 139 136 - 145 mmol/L Final      Potassium Potassium   Date Value Ref Range Status   07/20/2018 3.7 3.5 - 5.2 mmol/L Final   07/19/2018 3.7 3.5 - 5.2 mmol/L Final   07/19/2018 3.8 3.5 - 5.2 mmol/L Final   07/19/2018 3.6 3.5 - 5.2 mmol/L Final   07/18/2018 3.9 3.5 - 5.2 mmol/L Final      Chloride Chloride   Date Value  Ref Range Status   07/20/2018 108 (H) 98 - 107 mmol/L Final   07/19/2018 106 98 - 107 mmol/L Final   07/19/2018 104 98 - 107 mmol/L Final   07/18/2018 102 98 - 107 mmol/L Final      Bicarbonate CO2   Date Value Ref Range Status   07/20/2018 22.0 22.0 - 29.0 mmol/L Final   07/19/2018 23.2 22.0 - 29.0 mmol/L Final   07/19/2018 21.2 (L) 22.0 - 29.0 mmol/L Final   07/18/2018 27.1 22.0 - 29.0 mmol/L Final      BUN BUN   Date Value Ref Range Status   07/20/2018 16 6 - 20 mg/dL Final   07/19/2018 13 6 - 20 mg/dL Final   07/19/2018 14 6 - 20 mg/dL Final   07/18/2018 11 6 - 20 mg/dL Final      Creatinine Creatinine   Date Value Ref Range Status   07/20/2018 1.07 (H) 0.57 - 1.00 mg/dL Final   07/19/2018 1.26 (H) 0.57 - 1.00 mg/dL Final   07/19/2018 1.24 (H) 0.57 - 1.00 mg/dL Final   07/18/2018 1.04 (H) 0.57 - 1.00 mg/dL Final      Calcium Calcium   Date Value Ref Range Status   07/20/2018 8.4 (L) 8.6 - 10.5 mg/dL Final   07/19/2018 8.4 (L) 8.6 - 10.5 mg/dL Final   07/19/2018 8.9 8.6 - 10.5 mg/dL Final   07/18/2018 9.4 8.6 - 10.5 mg/dL Final      Magnesium Magnesium   Date Value Ref Range Status   07/20/2018 2.6 1.6 - 2.6 mg/dL Final   07/19/2018 2.4 1.6 - 2.6 mg/dL Final   07/19/2018 2.6 1.6 - 2.6 mg/dL Final                aspirin 81 mg Oral Daily   buPROPion  mg Oral Daily   ceFAZolin 2 g Intravenous Q8H   chlorhexidine 15 mL Mouth/Throat Q12H   enoxaparin 40 mg Subcutaneous Daily   gabapentin 600 mg Oral Q8H   insulin aspart 2-5 Units Subcutaneous 4x Daily AC & at Bedtime   insulin detemir 15 Units Subcutaneous Q12H   magnesium sulfate 1 g Intravenous Q8H   metoprolol tartrate 12.5 mg Oral Q12H   mupirocin  Each Nare BID   pantoprazole 40 mg Oral Daily   QUEtiapine 300 mg Oral Nightly   sennosides-docusate sodium 2 tablet Oral Nightly   topiramate 300 mg Oral Nightly   Vortioxetine HBr 5 mg Oral Daily With Breakfast       clevidipine 2-32 mg/hr    dexmedetomidine 0.2-1.5 mcg/kg/hr Last Rate: 0.2 mcg/kg/hr (07/19/18  1230)   DOPamine 2-20 mcg/kg/min    EPINEPHrine 0.02-0.3 mcg/kg/min    milrinone 0.25-0.75 mcg/kg/min Last Rate: Stopped (07/19/18 2300)   niCARdipine 5-15 mg/hr    nitroglycerin 5-200 mcg/min    norepinephrine 0.02-0.3 mcg/kg/min    phenylephrine 0.2-3 mcg/kg/min Last Rate: Stopped (07/19/18 1540)   propofol 5-50 mcg/kg/min Last Rate: Stopped (07/19/18 1400)   sodium chloride 30 mL/hr Last Rate: 30 mL/hr (07/19/18 1229)   sodium chloride 30 mL/hr Last Rate: 30 mL/hr (07/19/18 1229)   vasopressin 0.02-0.1 Units/min            Patient Active Problem List   Diagnosis Code   • Severe mitral regurgitation I34.0   • Coronary artery disease involving native coronary artery of native heart with angina pectoris (CMS/[facility]) I25.119   • Mitral valve insufficiency I34.0   • Coronary artery disease involving native heart without angina pectoris I25.10       Assessment & Plan    -Rheumatic mitral regurgitation, CAD s/p CABG x4 with LIMA and MV repair/ring----POD #1 ([place])  -preop LV dysfxn, EF 45%  -DM II----insulin pump, endocrinology assisting  -Rheumatoid arthritis----last immunosuppressive injection 6/20/18  -Chronic neck and back pain, fibromyalgia----on neurontin and norco every 6 hours routinely, last epidural injection 6/26/18  -HTN---controlled on current medication  -CKD----1.07 this am, will monitor  -Hypothyroidism  -Asthma---on routine inhalers  -Hx tobacco abuse, 30 pack year history----Quit 3/2018  -Bipolar disorder----well managed on current regimen  -Post op anemia/TCP----expected blood loss, will monitor  -Leukocytosis----reactive, afebrile    Urine output decreased over last 12 hours, Albumin given this am per Dr. Tobias.  Leave chest tubes with increased drainage last 8 hours, will eval this afternoon.  Continue to increase pulmonary toilet and activity, transfer to stepdown.     KALYANI Sims  07/20/18  8:26 AM

## 2018-07-21 ENCOUNTER — APPOINTMENT (OUTPATIENT)
Dept: GENERAL RADIOLOGY | Facility: HOSPITAL | Age: 53
End: 2018-07-21

## 2018-07-21 LAB
ANION GAP SERPL CALCULATED.3IONS-SCNC: 12 MMOL/L
BUN BLD-MCNC: 20 MG/DL (ref 6–20)
BUN/CREAT SERPL: 17.1 (ref 7–25)
CALCIUM SPEC-SCNC: 8.6 MG/DL (ref 8.6–10.5)
CHLORIDE SERPL-SCNC: 99 MMOL/L (ref 98–107)
CO2 SERPL-SCNC: 21 MMOL/L (ref 22–29)
CREAT BLD-MCNC: 1.17 MG/DL (ref 0.57–1)
DEPRECATED RDW RBC AUTO: 48.5 FL (ref 37–54)
ERYTHROCYTE [DISTWIDTH] IN BLOOD BY AUTOMATED COUNT: 14.2 % (ref 11.7–13)
GFR SERPL CREATININE-BSD FRML MDRD: 49 ML/MIN/1.73
GLUCOSE BLD-MCNC: 208 MG/DL (ref 65–99)
GLUCOSE BLDC GLUCOMTR-MCNC: 203 MG/DL (ref 70–130)
GLUCOSE BLDC GLUCOMTR-MCNC: 211 MG/DL (ref 70–130)
GLUCOSE BLDC GLUCOMTR-MCNC: 225 MG/DL (ref 70–130)
GLUCOSE BLDC GLUCOMTR-MCNC: 245 MG/DL (ref 70–130)
HCT VFR BLD AUTO: 34.2 % (ref 35.6–45.5)
HGB BLD-MCNC: 11 G/DL (ref 11.9–15.5)
MCH RBC QN AUTO: 30.2 PG (ref 26.9–32)
MCHC RBC AUTO-ENTMCNC: 32.2 G/DL (ref 32.4–36.3)
MCV RBC AUTO: 94 FL (ref 80.5–98.2)
PLATELET # BLD AUTO: 101 10*3/MM3 (ref 140–500)
PMV BLD AUTO: 12.7 FL (ref 6–12)
POTASSIUM BLD-SCNC: 4.3 MMOL/L (ref 3.5–5.2)
RBC # BLD AUTO: 3.64 10*6/MM3 (ref 3.9–5.2)
SODIUM BLD-SCNC: 132 MMOL/L (ref 136–145)
WBC NRBC COR # BLD: 12.71 10*3/MM3 (ref 4.5–10.7)

## 2018-07-21 PROCEDURE — 99233 SBSQ HOSP IP/OBS HIGH 50: CPT | Performed by: INTERNAL MEDICINE

## 2018-07-21 PROCEDURE — 93010 ELECTROCARDIOGRAM REPORT: CPT | Performed by: INTERNAL MEDICINE

## 2018-07-21 PROCEDURE — 80048 BASIC METABOLIC PNL TOTAL CA: CPT | Performed by: THORACIC SURGERY (CARDIOTHORACIC VASCULAR SURGERY)

## 2018-07-21 PROCEDURE — 82962 GLUCOSE BLOOD TEST: CPT

## 2018-07-21 PROCEDURE — 71045 X-RAY EXAM CHEST 1 VIEW: CPT

## 2018-07-21 PROCEDURE — 94640 AIRWAY INHALATION TREATMENT: CPT

## 2018-07-21 PROCEDURE — 63710000001 INSULIN ASPART PER 5 UNITS: Performed by: INTERNAL MEDICINE

## 2018-07-21 PROCEDURE — 25010000003 CEFAZOLIN IN DEXTROSE 2-4 GM/100ML-% SOLUTION: Performed by: THORACIC SURGERY (CARDIOTHORACIC VASCULAR SURGERY)

## 2018-07-21 PROCEDURE — 94799 UNLISTED PULMONARY SVC/PX: CPT

## 2018-07-21 PROCEDURE — 93005 ELECTROCARDIOGRAM TRACING: CPT | Performed by: INTERNAL MEDICINE

## 2018-07-21 PROCEDURE — 63710000001 INSULIN DETEMIR PER 5 UNITS: Performed by: INTERNAL MEDICINE

## 2018-07-21 PROCEDURE — 25010000002 FUROSEMIDE PER 20 MG: Performed by: NURSE PRACTITIONER

## 2018-07-21 PROCEDURE — 85027 COMPLETE CBC AUTOMATED: CPT | Performed by: THORACIC SURGERY (CARDIOTHORACIC VASCULAR SURGERY)

## 2018-07-21 PROCEDURE — 25010000002 ENOXAPARIN PER 10 MG: Performed by: THORACIC SURGERY (CARDIOTHORACIC VASCULAR SURGERY)

## 2018-07-21 PROCEDURE — 97110 THERAPEUTIC EXERCISES: CPT

## 2018-07-21 PROCEDURE — 99232 SBSQ HOSP IP/OBS MODERATE 35: CPT | Performed by: NURSE PRACTITIONER

## 2018-07-21 PROCEDURE — 99024 POSTOP FOLLOW-UP VISIT: CPT | Performed by: NURSE PRACTITIONER

## 2018-07-21 RX ORDER — FUROSEMIDE 10 MG/ML
40 INJECTION INTRAMUSCULAR; INTRAVENOUS ONCE
Status: COMPLETED | OUTPATIENT
Start: 2018-07-21 | End: 2018-07-21

## 2018-07-21 RX ORDER — CEFAZOLIN SODIUM 2 G/100ML
2 INJECTION, SOLUTION INTRAVENOUS ONCE
Status: COMPLETED | OUTPATIENT
Start: 2018-07-21 | End: 2018-07-21

## 2018-07-21 RX ORDER — POTASSIUM CHLORIDE 750 MG/1
20 CAPSULE, EXTENDED RELEASE ORAL DAILY
Status: DISCONTINUED | OUTPATIENT
Start: 2018-07-21 | End: 2018-07-24 | Stop reason: HOSPADM

## 2018-07-21 RX ORDER — GABAPENTIN 300 MG/1
600 CAPSULE ORAL EVERY 8 HOURS PRN
Status: DISCONTINUED | OUTPATIENT
Start: 2018-07-21 | End: 2018-07-24 | Stop reason: HOSPADM

## 2018-07-21 RX ORDER — GUAIFENESIN 600 MG/1
1200 TABLET, EXTENDED RELEASE ORAL EVERY 12 HOURS SCHEDULED
Status: DISCONTINUED | OUTPATIENT
Start: 2018-07-21 | End: 2018-07-24 | Stop reason: HOSPADM

## 2018-07-21 RX ADMIN — BUPROPION HYDROCHLORIDE 450 MG: 150 TABLET, EXTENDED RELEASE ORAL at 09:10

## 2018-07-21 RX ADMIN — ACETAMINOPHEN 650 MG: 325 TABLET, FILM COATED ORAL at 14:25

## 2018-07-21 RX ADMIN — LEVOTHYROXINE SODIUM 200 MCG: 100 TABLET ORAL at 09:10

## 2018-07-21 RX ADMIN — FUROSEMIDE 40 MG: 10 INJECTION, SOLUTION INTRAMUSCULAR; INTRAVENOUS at 09:17

## 2018-07-21 RX ADMIN — BUDESONIDE AND FORMOTEROL FUMARATE DIHYDRATE 2 PUFF: 160; 4.5 AEROSOL RESPIRATORY (INHALATION) at 20:48

## 2018-07-21 RX ADMIN — INSULIN ASPART 3 UNITS: 100 INJECTION, SOLUTION INTRAVENOUS; SUBCUTANEOUS at 07:01

## 2018-07-21 RX ADMIN — METOPROLOL TARTRATE 12.5 MG: 25 TABLET ORAL at 21:52

## 2018-07-21 RX ADMIN — CHLORHEXIDINE GLUCONATE 15 ML: 1.2 RINSE ORAL at 11:42

## 2018-07-21 RX ADMIN — INSULIN ASPART 8 UNITS: 100 INJECTION, SOLUTION INTRAVENOUS; SUBCUTANEOUS at 17:04

## 2018-07-21 RX ADMIN — ACETAMINOPHEN 650 MG: 325 TABLET, FILM COATED ORAL at 00:23

## 2018-07-21 RX ADMIN — CEFAZOLIN SODIUM 2 G: 2 INJECTION, SOLUTION INTRAVENOUS at 06:05

## 2018-07-21 RX ADMIN — INSULIN DETEMIR 25 UNITS: 100 INJECTION, SOLUTION SUBCUTANEOUS at 21:53

## 2018-07-21 RX ADMIN — ACETAMINOPHEN 650 MG: 325 TABLET, FILM COATED ORAL at 10:11

## 2018-07-21 RX ADMIN — ACETAMINOPHEN 650 MG: 325 TABLET, FILM COATED ORAL at 18:33

## 2018-07-21 RX ADMIN — INSULIN DETEMIR 15 UNITS: 100 INJECTION, SOLUTION SUBCUTANEOUS at 09:12

## 2018-07-21 RX ADMIN — POTASSIUM CHLORIDE 20 MEQ: 750 CAPSULE, EXTENDED RELEASE ORAL at 09:17

## 2018-07-21 RX ADMIN — INSULIN ASPART 3 UNITS: 100 INJECTION, SOLUTION INTRAVENOUS; SUBCUTANEOUS at 17:05

## 2018-07-21 RX ADMIN — MONTELUKAST 10 MG: 10 TABLET, FILM COATED ORAL at 21:52

## 2018-07-21 RX ADMIN — GUAIFENESIN 1200 MG: 600 TABLET, EXTENDED RELEASE ORAL at 21:52

## 2018-07-21 RX ADMIN — INSULIN ASPART 3 UNITS: 100 INJECTION, SOLUTION INTRAVENOUS; SUBCUTANEOUS at 21:53

## 2018-07-21 RX ADMIN — OXYCODONE HYDROCHLORIDE 5 MG: 5 TABLET ORAL at 18:33

## 2018-07-21 RX ADMIN — PANTOPRAZOLE SODIUM 40 MG: 40 TABLET, DELAYED RELEASE ORAL at 09:11

## 2018-07-21 RX ADMIN — QUETIAPINE FUMARATE 200 MG: 200 TABLET, FILM COATED ORAL at 23:26

## 2018-07-21 RX ADMIN — MUPIROCIN 10 APPLICATION: 20 OINTMENT TOPICAL at 09:11

## 2018-07-21 RX ADMIN — GABAPENTIN 600 MG: 300 CAPSULE ORAL at 06:05

## 2018-07-21 RX ADMIN — DOCUSATE SODIUM -SENNOSIDES 2 TABLET: 50; 8.6 TABLET, COATED ORAL at 21:52

## 2018-07-21 RX ADMIN — INSULIN ASPART 3 UNITS: 100 INJECTION, SOLUTION INTRAVENOUS; SUBCUTANEOUS at 11:42

## 2018-07-21 RX ADMIN — TOPIRAMATE 300 MG: 100 TABLET, FILM COATED ORAL at 21:52

## 2018-07-21 RX ADMIN — METOPROLOL TARTRATE 12.5 MG: 25 TABLET ORAL at 09:10

## 2018-07-21 RX ADMIN — OXYCODONE HYDROCHLORIDE 5 MG: 5 TABLET ORAL at 14:25

## 2018-07-21 RX ADMIN — OXYCODONE HYDROCHLORIDE 5 MG: 5 TABLET ORAL at 10:11

## 2018-07-21 RX ADMIN — ENOXAPARIN SODIUM 40 MG: 40 INJECTION SUBCUTANEOUS at 09:09

## 2018-07-21 RX ADMIN — CHLORHEXIDINE GLUCONATE 15 ML: 1.2 RINSE ORAL at 00:29

## 2018-07-21 RX ADMIN — OXYCODONE HYDROCHLORIDE 10 MG: 5 TABLET ORAL at 00:23

## 2018-07-21 RX ADMIN — BUDESONIDE AND FORMOTEROL FUMARATE DIHYDRATE 2 PUFF: 160; 4.5 AEROSOL RESPIRATORY (INHALATION) at 09:29

## 2018-07-21 RX ADMIN — ASPIRIN 81 MG: 81 TABLET ORAL at 09:10

## 2018-07-21 NOTE — PLAN OF CARE
Problem: Patient Care Overview  Goal: Plan of Care Review  Outcome: Ongoing (interventions implemented as appropriate)   07/21/18 0916   Coping/Psychosocial   Plan of Care Reviewed With patient   Plan of Care Review   Progress improving   OTHER   Outcome Summary Pt increasing with amb safety and distance as well as activity tolerance

## 2018-07-21 NOTE — THERAPY TREATMENT NOTE
Acute Care - Physical Therapy Treatment Note  [facility]     Patient Name: Angeles Oquendo  : 1965  MRN: [id number]  Today's Date: 2018  Onset of Illness/Injury or Date of Surgery: 18  Date of Referral to PT: 18  Referring Physician: Jatinder    Admit Date: 2018    Visit Dx:    ICD-10-CM ICD-9-CM   1. Coronary artery disease involving native coronary artery of native heart with angina pectoris (CMS/HCC) I25.119 414.01     413.9   2. Coronary artery disease involving native heart without angina pectoris, unspecified vessel or lesion type I25.10 414.01   3. Mitral valve insufficiency, unspecified etiology I34.0 424.0     Patient Active Problem List   Diagnosis   • Severe mitral regurgitation   • Coronary artery disease involving native coronary artery of native heart with angina pectoris (CMS/HCC)   • Mitral valve insufficiency   • Coronary artery disease involving native heart without angina pectoris       Therapy Treatment          Rehabilitation Treatment Summary     Row Name 18             Treatment Time/Intention    Discipline physical therapy assistant  -CW      Document Type therapy note (daily note)  -CW      Subjective Information complains of;weakness;fatigue;pain  -CW      Mode of Treatment physical therapy  -CW      Therapy Frequency (PT Clinical Impression) daily  -CW      Patient Effort good  -CW      Existing Precautions/Restrictions fall;cardiac;sternal  -CW      Recorded by [CW] Paulino Kelly, VON 18      Row Name 18             Vital Signs    O2 Delivery Pre Treatment supplemental O2  -CW      O2 Delivery Intra Treatment room air  -CW      O2 Delivery Post Treatment supplemental O2  -CW      Recorded by [CW] Paulino Kelly PTA 18      Row Name 18             Cognitive Assessment/Intervention- PT/OT    Orientation Status (Cognition) oriented x 3  -CW      Follows Commands (Cognition) WNL  -CW      Personal  Safety Interventions fall prevention program maintained;muscle strengthening facilitated;nonskid shoes/slippers when out of bed  -CW      Recorded by [CW] Paulino Kelly, [facility] [place] 07/21/18 0915      Row Name 07/21/18 0900             Bed Mobility Assessment/Treatment    Sit-Supine Dunn (Bed Mobility) not tested  -CW      Comment (Bed Mobility) up in chair  -CW      Recorded by [CW] Paulino Kelly, [facility] [place] 07/21/18 0915      Row Name 07/21/18 0900             Transfer Assessment/Treatment    Transfer Assessment/Treatment sit-stand transfer;stand-sit transfer  -CW      Recorded by [CW] Paulino Kelly, [facility] [place] 07/21/18 0915      Row Name 07/21/18 0900             Sit-Stand Transfer    Sit-Stand Dunn (Transfers) contact guard  -CW      Recorded by [CW] Paulino Kelly, [facility] [place] 07/21/18 0915      Row Name 07/21/18 0900             Stand-Sit Transfer    Stand-Sit Dunn (Transfers) contact guard  -CW      Recorded by [CW] Paulino Kelly, [facility] [place] 07/21/18 0915      Row Name 07/21/18 0900             Gait/Stairs Assessment/Training    Dunn Level (Gait) contact guard  -CW      Distance in Feet (Gait) 80  -CW      Pattern (Gait) swing-through  -CW      Recorded by [CW] Paulino Kelly, [facility] [place] 07/21/18 0915      Row Name 07/21/18 0900             Positioning and Restraints    Pre-Treatment Position sitting in chair/recliner  -CW      Post Treatment Position chair  -CW      In Chair notified nsg;sitting;call light within reach;encouraged to call for assist;with nsg  -CW      Recorded by [CW] Paulino Kelly, [facility] [place] 07/21/18 0915      Row Name 07/21/18 0900             Pain Assessment    Additional Documentation Pain Scale: Numbers Pre/Post-Treatment (Group)  -CW      Recorded by [CW] Paulino Kelly, [facility] [place] 07/21/18 0915      Row Name 07/21/18 0900             Pain Scale: Numbers Pre/Post-Treatment    Pain Scale: Numbers, Pretreatment 6/10  -CW      Pain Scale: Numbers, Post-Treatment 7/10  -CW       Pain Location - Orientation incisional  -CW      Pain Location chest  -CW      Pain Intervention(s) Repositioned;Ambulation/increased activity  -CW      Recorded by [CW] Paulino Kelly PTA 07/21/18 0915      Row Name                Wound 07/19/18 0957 chest incision    Wound - Properties Group Date first assessed: 07/19/18 [SR] Time first assessed: 0957 [SR] Location: chest [SR] Type: incision [SR] Recorded by:  [SR] Jackie Dia RN 07/19/18 0957    Row Name                Wound 07/19/18 0957 Right leg incision    Wound - Properties Group Date first assessed: 07/19/18 [SR] Time first assessed: 0957 [SR] Side: Right [SR] Location: leg [SR] Type: incision [SR] Recorded by:  [SR] Jackie Dia RN 07/19/18 0957    Row Name 07/21/18 0900             Outcome Summary/Treatment Plan (PT)    Anticipated Discharge Disposition (PT) home with home health  -CW      Recorded by [CW] Paulino Kelly PTA 07/21/18 0915        User Key  (r) = Recorded By, (t) = Taken By, (c) = Cosigned By    Initials Name Effective Dates Discipline    SR Jackie Dia RN 06/16/16 -  Nurse    CW Paulino Kelly PTA 03/07/18 -  PT          Wound 07/19/18 0957 chest incision (Active)   Dressing Appearance dry;intact 7/21/2018  8:00 AM   Closure SANDRA 7/21/2018  8:00 AM   Base dressing in place, unable to visualize 7/21/2018  8:00 AM   Drainage Amount none 7/21/2018  8:00 AM       Wound 07/19/18 0957 Right leg incision (Active)   Dressing Appearance dry;intact 7/21/2018  8:00 AM   Closure Approximated;Liquid skin adhesive 7/21/2018  8:00 AM   Drainage Amount none 7/21/2018  8:00 AM             Physical Therapy Education     Title: PT OT SLP Therapies (Done)     Topic: Physical Therapy (Done)     Point: Mobility training (Done)    Learning Progress Summary     Learner Status Readiness Method Response Comment Documented by    Patient Done Acceptance E,TB YESSICA,JERRI WOOD 07/21/18 0915          Point: Home exercise program (Done)    Learning Progress  Summary     Learner Status Readiness Method Response Comment Documented by    Patient Done Acceptance JORGE PEÑALOZA DU  CW 07/21/18 0915          Point: Body mechanics (Done)    Learning Progress Summary     Learner Status Readiness Method Response Comment Documented by    Patient Done Acceptance JORGE PEÑALOZA DU  CW 07/21/18 0915          Point: Precautions (Done)    Learning Progress Summary     Learner Status Readiness Method Response Comment Documented by    Patient Done Acceptance JORGE PEÑALOZA DU  ISRAEL 07/21/18 0915     Done Acceptance ANABELA YESSICA JONES 07/20/18 0905                      User Key     Initials Effective Dates Name Provider Type Discipline    MD 04/03/18 -  Kelly Spears, PT Physical Therapist PT    CW 03/07/18 -  Paulino Kelly, PTA Physical Therapy Assistant PT                    PT Recommendation and Plan  Anticipated Discharge Disposition (PT): home with home health  Therapy Frequency (PT Clinical Impression): daily  Outcome Summary/Treatment Plan (PT)  Anticipated Discharge Disposition (PT): home with home health  Plan of Care Reviewed With: patient  Progress: improving  Outcome Summary: Pt increasing with amb safety and distance as well as activity otlerance          Outcome Measures     Row Name 07/21/18 0900 07/20/18 0900          How much help from another person do you currently need...    Turning from your back to your side while in flat bed without using bedrails? 2  -CW 2  -MD     Moving from lying on back to sitting on the side of a flat bed without bedrails? 2  -CW 2  -MD     Moving to and from a bed to a chair (including a wheelchair)? 3  -CW 3  -MD     Standing up from a chair using your arms (e.g., wheelchair, bedside chair)? 3  -CW 3  -MD     Climbing 3-5 steps with a railing? 2  -CW 2  -MD     To walk in hospital room? 3  -CW 3  -MD     AM-PAC 6 Clicks Score 15  -CW 15  -MD        Functional Assessment    Outcome Measure Options AM-PAC 6 Clicks Basic Mobility (PT)  -CW AM-PAC 6 Clicks Basic Mobility (PT)   -MD       User Key  (r) = Recorded By, (t) = Taken By, (c) = Cosigned By    Initials Name Provider Type    MD Kelly Spears, PT Physical Therapist    CW Paulino Kelly PTA Physical Therapy Assistant           Time Calculation:         PT Charges     Row Name 07/21/18 0916             Time Calculation    Start Time 0900  -CW      Stop Time 0916  -CW      Time Calculation (min) 16 min  -CW      PT Received On 07/21/18  -CW      PT - Next Appointment 07/22/18  -CW        User Key  (r) = Recorded By, (t) = Taken By, (c) = Cosigned By    Initials Name Provider Type    ISRAEL Kelly PTA Physical Therapy Assistant        Therapy Suggested Charges     Code   Minutes Charges    None           Therapy Charges for Today     Code Description Service Date Service Provider Modifiers Qty    [id number] HC PT THER PROC EA 15 MIN 7/21/2018 Paulino Kelly PTA GP 1          PT G-Codes  Outcome Measure Options: AM-PAC 6 Clicks Basic Mobility (PT)    Paulino Kelly PTA  7/21/2018

## 2018-07-21 NOTE — PROGRESS NOTES
53 y.o.   LOS: 2 days   Patient Care Team:  Charlotte Johnson MD as PCP - General  Charlotte Johnson MD as PCP - Family Medicine  Janeth Young MD as Consulting Physician (Endocrinology)  Jim Schafer MD as Consulting Physician (Rheumatology)  Hari Merritt MD as Consulting Physician (Pain Medicine)  Cesar Remy MD as Consulting Physician (Psychiatry)  Jesus James MD as Consulting Physician (Cardiology)    Chief Complaint:  Uncontrolled type 2 diabetes mellitus and postoperative management    No chief complaint on file.      Subjective     HPI  Patient is status post coronary artery bypass grafting  Patient's appetite is not improving  Her blood sugars are stable  The ranging from   Patient wants to continue the basal and bolus insulin regimen for now and does not want to switch over to the insulin pump    Interval History:     Patient is a known diabetic for more than 16 years of which she had been on pump therapy for nearly 8 years  She's currently using Medtronic 670 G pump at home  And she does not use CGM  Pump is not at the bedside  Her average daily dosage is about 43 units  Patient does have symptoms suggestive of diabetic peripheral neuropathy  She denied any knowledge of diabetic retinopathy or nephropathy  Her recent hemoglobin A1c 6.7%.  She reported that it used to be 14% prior to pump therapy     Patient is status post coronary artery bypass grafting postoperative day 1  She just started [place]  She is off the insulin drip since her blood sugars are in the 112 range  Review of Systems:      Review of Systems   Constitutional: Positive for appetite change and fatigue.   Respiratory: Negative.    Cardiovascular: Positive for chest pain.   Gastrointestinal: Positive for abdominal distention. Negative for abdominal pain.   Neurological: Positive for dizziness and numbness.   All other systems reviewed and are negative.    Objective     Vital Signs   Temp:   [98 °F (36.7 °C)-99.7 °F (37.6 °C)] 98 °F (36.7 °C)  Heart Rate:  [72-82] 80  Resp:  [16-18] 18  BP: (108-127)/(63-78) 109/69    Physical Exam:  Physical Exam   Constitutional: She is oriented to person, place, and time.   Eyes: Pupils are equal, round, and reactive to light. EOM are normal.   Neck: Normal range of motion. Neck supple.   Cardiovascular: Normal rate, regular rhythm, normal heart sounds and intact distal pulses.    Pulmonary/Chest: Effort normal and breath sounds normal.   Abdominal: Soft. Bowel sounds are normal. She exhibits distension. There is no tenderness.   Musculoskeletal: Normal range of motion. She exhibits no edema.   Neurological: She is alert and oriented to person, place, and time.   Skin: Skin is warm and dry.   Psychiatric: She has a normal mood and affect. Her behavior is normal.   Nursing note and vitals reviewed.  Results Review:     I reviewed the patient's new clinical results.      Glucose   Date/Time Value Ref Range Status   07/21/2018 0415 208 (H) 65 - 99 mg/dL Final   07/20/2018 0300 99 65 - 99 mg/dL Final   07/19/2018 1506 168 (H) 65 - 99 mg/dL Final   07/19/2018 1141 211 (H) 65 - 99 mg/dL Final     Lab Results (last 72 hours)     Procedure Component Value Units Date/Time    CBC (No Diff) [[id number]]  (Abnormal) Collected:  07/21/18 0415    Specimen:  Blood Updated:  07/21/18 0621     WBC 12.71 (H) 10*3/mm3      RBC 3.64 (L) 10*6/mm3      Hemoglobin 11.0 (L) g/dL      Hematocrit 34.2 (L) %      MCV 94.0 fL      MCH 30.2 pg      MCHC 32.2 (L) g/dL      RDW 14.2 (H) %      RDW-SD 48.5 fl      MPV 12.7 (H) fL      Platelets 101 (L) 10*3/mm3     POC Glucose Once [[id number]]  (Abnormal) Collected:  07/21/18 0534    Specimen:  Blood Updated:  07/21/18 0538     Glucose 225 (H) mg/dL     Narrative:       Meter: [id number] : [id number] Taylor Anaya RN    Basic Metabolic Panel [[id number]]  (Abnormal) Collected:  07/21/18 0415    Specimen:  Blood Updated:  07/21/18 0458      Glucose 208 (H) mg/dL      BUN 20 mg/dL      Creatinine 1.17 (H) mg/dL      Sodium 132 (L) mmol/L      Potassium 4.3 mmol/L      Chloride 99 mmol/L      CO2 21.0 (L) mmol/L      Calcium 8.6 mg/dL      eGFR Non African Amer 49 (L) mL/min/1.73      BUN/Creatinine Ratio 17.1     Anion Gap 12.0 mmol/L     Narrative:       GFR Normal >60  Chronic Kidney Disease <60  Kidney Failure <15    POC Glucose Once [[id number]]  (Abnormal) Collected:  07/20/18 1959    Specimen:  Blood Updated:  07/20/18 2000     Glucose 220 (H) mg/dL     Narrative:       Meter: [id number] : [id number] Wiley Bell NA    POC Glucose Once [[id number]]  (Abnormal) Collected:  07/20/18 1537    Specimen:  Blood Updated:  07/20/18 1538     Glucose 244 (H) mg/dL     Narrative:       Meter: [id number] : [id number] Eric Marie NA    POC Glucose Once [[id number]]  (Abnormal) Collected:  07/20/18 1034    Specimen:  Blood Updated:  07/20/18 1038     Glucose 197 (H) mg/dL     Narrative:       Meter: [id number] : [id number] Yrn Carvalho NA    POC Glucose Once [[id number]]  (Abnormal) Collected:  07/20/18 0815    Specimen:  Blood Updated:  07/20/18 0819     Glucose 187 (H) mg/dL     Narrative:       Meter: [id number] : [id number] José Luis Graves RN    POC Glucose Once [[id number]]  (Normal) Collected:  07/20/18 0504    Specimen:  Blood Updated:  07/20/18 0506     Glucose 112 mg/dL     Narrative:       Meter: [id number] : [id number] Elena Noriega RN    POC Glucose Once [[id number]]  (Normal) Collected:  07/20/18 0358    Specimen:  Blood Updated:  07/20/18 0400     Glucose 117 mg/dL     Narrative:       Meter: [id number] : [id number] Elena Noriega RN    Renal Function Panel [[id number]]  (Abnormal) Collected:  07/20/18 0300    Specimen:  Blood Updated:  07/20/18 0351     Glucose 99 mg/dL      BUN 16 mg/dL      Creatinine 1.07 (H) mg/dL      Sodium 142 mmol/L      Potassium 3.7 mmol/L      Chloride 108 (H) mmol/L      CO2 22.0 mmol/L       Calcium 8.4 (L) mg/dL      Albumin 3.60 g/dL      Phosphorus 4.0 mg/dL      Anion Gap 12.0 mmol/L      BUN/Creatinine Ratio 15.0     eGFR Non African Amer 54 (L) mL/min/1.73     Magnesium [[id number]]  (Normal) Collected:  07/20/18 0300    Specimen:  Blood Updated:  07/20/18 0342     Magnesium 2.6 mg/dL     Protime-INR [[id number]]  (Abnormal) Collected:  07/20/18 0300    Specimen:  Blood Updated:  07/20/18 0331     Protime 16.5 (H) Seconds      INR 1.36 (H)    CBC & Differential [[id number]] Collected:  07/20/18 0300    Specimen:  Blood Updated:  07/20/18 0324    Narrative:       The following orders were created for panel order CBC & Differential.  Procedure                               Abnormality         Status                     ---------                               -----------         ------                     CBC Auto Differential[[id number]]        Abnormal            Final result                 Please view results for these tests on the individual orders.    CBC Auto Differential [[id number]]  (Abnormal) Collected:  07/20/18 0300    Specimen:  Blood Updated:  07/20/18 0324     WBC 11.21 (H) 10*3/mm3      RBC 3.65 (L) 10*6/mm3      Hemoglobin 10.9 (L) g/dL      Hematocrit 34.3 (L) %      MCV 94.0 fL      MCH 29.9 pg      MCHC 31.8 (L) g/dL      RDW 13.9 (H) %      RDW-SD 47.7 fl      MPV 11.9 fL      Platelets 84 (L) 10*3/mm3      Neutrophil % 74.9 %      Lymphocyte % 16.1 (L) %      Monocyte % 7.8 %      Eosinophil % 0.8 %      Basophil % 0.2 %      Immature Grans % 0.2 %      Neutrophils, Absolute 8.40 (H) 10*3/mm3      Lymphocytes, Absolute 1.81 10*3/mm3      Monocytes, Absolute 0.87 10*3/mm3      Eosinophils, Absolute 0.09 10*3/mm3      Basophils, Absolute 0.02 10*3/mm3      Immature Grans, Absolute 0.02 10*3/mm3     POC Glucose Once [[id number]]  (Normal) Collected:  07/20/18 0303    Specimen:  Blood Updated:  07/20/18 0305     Glucose 102 mg/dL     Narrative:       Meter: [id number] : [id number]  Elena Noriega RN    POC Glucose Once [[id number]]  (Normal) Collected:  07/20/18 0203    Specimen:  Blood Updated:  07/20/18 0205     Glucose 95 mg/dL     Narrative:       Meter: [id number] : [id number] Elena Noriega RN    POC Glucose Once [[id number]]  (Normal) Collected:  07/20/18 0103    Specimen:  Blood Updated:  07/20/18 0104     Glucose 83 mg/dL     Narrative:       Meter: [id number] : [id number] Elena Noriega RN    POC Glucose Once [[id number]]  (Normal) Collected:  07/20/18 0003    Specimen:  Blood Updated:  07/20/18 0007     Glucose 89 mg/dL     Narrative:       Meter: [id number] : [id number] Elena Noriega RN    POC Glucose Once [[id number]]  (Normal) Collected:  07/19/18 2303    Specimen:  Blood Updated:  07/19/18 2306     Glucose 110 mg/dL     Narrative:       Meter: [id number] : [id number] Elena Noriega RN    POC Glucose Once [[id number]]  (Normal) Collected:  07/19/18 2200    Specimen:  Blood Updated:  07/19/18 2202     Glucose 127 mg/dL     Narrative:       Meter: [id number] : [id number] Elena Noriega RN    POC Glucose Once [[id number]]  (Normal) Collected:  07/19/18 2106    Specimen:  Blood Updated:  07/19/18 2108     Glucose 130 mg/dL     Narrative:       Meter: [id number] : [id number] Elena Noriega RN    Potassium [[id number]]  (Normal) Collected:  07/19/18 2035    Specimen:  Blood Updated:  07/19/18 2101     Potassium 3.7 mmol/L     POC Glucose Once [[id number]]  (Abnormal) Collected:  07/19/18 2006    Specimen:  Blood Updated:  07/19/18 2029     Glucose 135 (H) mg/dL     Narrative:       Meter: [id number] : [id number] Elena Noriega RN    POC Glucose Once [[id number]]  (Abnormal) Collected:  07/19/18 1838    Specimen:  Blood Updated:  07/19/18 1849     Glucose 151 (H) mg/dL     Narrative:       Meter: [id number] : [id number] José Luis Graves RN    POC Glucose Once [[id number]]  (Abnormal) Collected:  07/19/18 1702    Specimen:  Blood Updated:  07/19/18 0089      Glucose 160 (H) mg/dL     Narrative:       Meter: [id number] : [id number] José Luis Graves RN    POC Glucose Once [[id number]]  (Abnormal) Collected:  07/19/18 1559    Specimen:  Blood Updated:  07/19/18 1609     Glucose 163 (H) mg/dL     Narrative:       Meter: [id number] : [id number] José Luis Graves RN    Renal Function Panel [[id number]]  (Abnormal) Collected:  07/19/18 1506    Specimen:  Blood Updated:  07/19/18 1550     Glucose 168 (H) mg/dL      BUN 13 mg/dL      Creatinine 1.26 (H) mg/dL      Sodium 141 mmol/L      Potassium 3.8 mmol/L      Chloride 106 mmol/L      CO2 23.2 mmol/L      Calcium 8.4 (L) mg/dL      Albumin 4.50 g/dL      Phosphorus 1.2 (L) mg/dL      Anion Gap 11.8 mmol/L      BUN/Creatinine Ratio 10.3     eGFR Non African Amer 45 (L) mL/min/1.73     Magnesium [[id number]]  (Normal) Collected:  07/19/18 1506    Specimen:  Blood Updated:  07/19/18 1548     Magnesium 2.4 mg/dL     CBC (No Diff) [[id number]]  (Abnormal) Collected:  07/19/18 1506    Specimen:  Blood Updated:  07/19/18 1526     WBC 14.62 (H) 10*3/mm3      RBC 3.89 (L) 10*6/mm3      Hemoglobin 11.5 (L) g/dL      Hematocrit 36.2 %      MCV 93.1 fL      MCH 29.6 pg      MCHC 31.8 (L) g/dL      RDW 13.8 (H) %      RDW-SD 46.7 fl      MPV 11.5 fL      Platelets 101 (L) 10*3/mm3     POC Glucose Once [[id number]]  (Abnormal) Collected:  07/19/18 1456    Specimen:  Blood Updated:  07/19/18 1517     Glucose 181 (H) mg/dL     Narrative:       Meter: [id number] : [id number] José Luis Graves RN    Blood Gas, Arterial [[id number]]  (Abnormal) Collected:  07/19/18 1509    Specimen:  Arterial Blood Updated:  07/19/18 1515     Site Arterial Line     Dale's Test N/A     pH, Arterial 7.298 (C) pH units      Comment: Critical:Notify ISHAN nieto (19-Jul-18 15:11:31)Read back ok        pCO2, Arterial 50.1 (H) mm Hg      pO2, Arterial 137.0 (H) mm Hg      HCO3, Arterial 24.6 mmol/L      Base Excess, Arterial -2.3 (L) mmol/L      O2 Saturation Calculated 98.8 %       A-a Gradiant 0.6 mmHg      Barometric Pressure for Blood Gas 750.9 mmHg      Modality Adult Vent     FIO2 40 %      Ventilator Mode PS     Set Tidal Volume 400     Set Mech Resp Rate 20     PEEP 5    Narrative:       Meter: [id number] : [id number] Graf Prajapati    POC Glucose Once [[id number]]  (Abnormal) Collected:  07/19/18 1405    Specimen:  Blood Updated:  07/19/18 1416     Glucose 188 (H) mg/dL     Narrative:       Meter: [id number] : [id number] José Luis Graves RN    POC Glucose Once [[id number]]  (Abnormal) Collected:  07/19/18 1313    Specimen:  Blood Updated:  07/19/18 1334     Glucose 215 (H) mg/dL     Narrative:       Meter: [id number] : [id number] José Luis Graves RN    Protime-INR [[id number]]  (Abnormal) Collected:  07/19/18 1141    Specimen:  Blood Updated:  07/19/18 1242     Protime 17.9 (H) Seconds      INR 1.51 (H)    aPTT [[id number]]  (Normal) Collected:  07/19/18 1141    Specimen:  Blood Updated:  07/19/18 1242     PTT 34.0 seconds     Fibrinogen [[id number]]  (Normal) Collected:  07/19/18 1141    Specimen:  Blood Updated:  07/19/18 1242     Fibrinogen 219 mg/dL     Renal Function Panel [[id number]]  (Abnormal) Collected:  07/19/18 1141    Specimen:  Blood Updated:  07/19/18 1218     Glucose 211 (H) mg/dL      BUN 14 mg/dL      Creatinine 1.24 (H) mg/dL      Sodium 139 mmol/L      Potassium 3.6 mmol/L      Chloride 104 mmol/L      CO2 21.2 (L) mmol/L      Calcium 8.9 mg/dL      Albumin 3.80 g/dL      Phosphorus 3.4 mg/dL      Anion Gap 13.8 mmol/L      BUN/Creatinine Ratio 11.3     eGFR Non African Amer 46 (L) mL/min/1.73     Calcium, Ionized [[id number]]  (Normal) Collected:  07/19/18 1141    Specimen:  Blood Updated:  07/19/18 1216     Ionized Calcium 1.27 mmol/L      Ionized Calcium 5.1 mg/dL     Blood Gas, Arterial [[id number]]  (Abnormal) Collected:  07/19/18 1212    Specimen:  Arterial Blood Updated:  07/19/18 1215     Site Arterial Line     Dale's Test N/A     pH, Arterial 7.298 (C) pH  units      Comment: Critical:Notify ISHAN nieto (19-Jul-18 12:14:38)Read back ok        pCO2, Arterial 48.4 (H) mm Hg      pO2, Arterial 453.8 (H) mm Hg      HCO3, Arterial 23.7 mmol/L      Base Excess, Arterial -3.1 (L) mmol/L      O2 Saturation Calculated 100.0 (H) %      A-a Gradiant 0.7 mmHg      Barometric Pressure for Blood Gas 751.7 mmHg      Modality Adult Vent     FIO2 100 %      Ventilator Mode VC     Set Tidal Volume 550     Set Mech Resp Rate 14     Rate 14 Breaths/minute      PEEP 7.5    Narrative:       Meter: [id number] : [id number] Graf Prajapati    Magnesium [[id number]]  (Normal) Collected:  07/19/18 1141    Specimen:  Blood Updated:  07/19/18 1213     Magnesium 2.6 mg/dL     POC Glucose Once [[id number]]  (Abnormal) Collected:  07/19/18 1141    Specimen:  Blood Updated:  07/19/18 1151     Glucose 219 (H) mg/dL     Narrative:       Meter: [id number] : [id number] José Luis Graves RN    CBC & Differential [[id number]] Collected:  07/19/18 1141    Specimen:  Blood Updated:  07/19/18 1150    Narrative:       The following orders were created for panel order CBC & Differential.  Procedure                               Abnormality         Status                     ---------                               -----------         ------                     CBC Auto Differential[[id number]]        Abnormal            Final result                 Please view results for these tests on the individual orders.    CBC Auto Differential [[id number]]  (Abnormal) Collected:  07/19/18 1141    Specimen:  Blood Updated:  07/19/18 1150     WBC 10.70 10*3/mm3      RBC 3.80 (L) 10*6/mm3      Hemoglobin 11.4 (L) g/dL      Hematocrit 35.4 (L) %      MCV 93.2 fL      MCH 30.0 pg      MCHC 32.2 (L) g/dL      RDW 13.7 (H) %      RDW-SD 46.3 fl      MPV 11.0 fL      Platelets 115 (L) 10*3/mm3      Neutrophil % 85.9 (H) %      Lymphocyte % 11.9 (L) %      Monocyte % 0.6 (L) %      Eosinophil % 0.7 %      Basophil % 0.4 %       Immature Grans % 0.5 %      Neutrophils, Absolute 9.20 (H) 10*3/mm3      Lymphocytes, Absolute 1.27 10*3/mm3      Monocytes, Absolute 0.06 (L) 10*3/mm3      Eosinophils, Absolute 0.08 10*3/mm3      Basophils, Absolute 0.04 10*3/mm3      Immature Grans, Absolute 0.05 (H) 10*3/mm3     POC Activated Clotting Time [[id number]]  (Normal) Collected:  07/19/18 1041    Specimen:  Blood Updated:  07/19/18 1058     Activated Clotting Time  131 Seconds      Comment: Serial Number: [id number]:  [id number]       POC Activated Clotting Time [[id number]]  (Normal) Collected:  07/19/18 1036    Specimen:  Blood Updated:  07/19/18 1058     Activated Clotting Time  136 Seconds      Comment: Serial Number: [id number]:  [id number]       POC Activated Clotting Time [[id number]]  (Abnormal) Collected:  07/19/18 0940    Specimen:  Blood Updated:  07/19/18 1057     Activated Clotting Time  472 (H) Seconds      Comment: Serial Number: [id number]:  [id number]       POC Activated Clotting Time [[id number]]  (Abnormal) Collected:  07/19/18 0910    Specimen:  Blood Updated:  07/19/18 1057     Activated Clotting Time  477 (H) Seconds      Comment: Serial Number: [id number]:  [id number]       POC Activated Clotting Time [[id number]]  (Abnormal) Collected:  07/19/18 0848    Specimen:  Blood Updated:  07/19/18 1057     Activated Clotting Time  389 (H) Seconds      Comment: Serial Number: [id number]:  [id number]       POC Activated Clotting Time [[id number]]  (Abnormal) Collected:  07/19/18 0820    Specimen:  Blood Updated:  07/19/18 1057     Activated Clotting Time  516 (H) Seconds      Comment: Serial Number: [id number]:  [id number]       POC Activated Clotting Time [[id number]]  (Normal) Collected:  07/19/18 0734    Specimen:  Blood Updated:  07/19/18 1056     Activated Clotting Time  131 Seconds      Comment: Serial Number: [id number]:  0808       POC Glucose Once [[id number]]  (Abnormal) Collected:  07/19/18 0514    Specimen:  Blood Updated:  07/19/18 0515      Glucose 220 (H) mg/dL     Narrative:       Meter: [id number] : [id number] Goldie GOLDSMITH        Imaging Results (last 72 hours)     Procedure Component Value Units Date/Time    XR Chest 1 View [[id number]] Collected:  07/21/18 0727     Updated:  07/21/18 0727    Narrative:       PORTABLE CHEST X-RAY     CLINICAL HISTORY: Post-Op Heart Surgery; I25.119-Atherosclerotic heart  disease of native coronary artery with unspecified angina pectoris;  I25.10-Atherosclerotic heart disease of native coronary artery without  angina pectoris; I34.0-Nonrheumatic mitral (valve) insufficiency     COMPARISON: 07/20/2018.     FINDINGS: Portable AP view of the chest was obtained with overlying  monitor leads in place. [place]-L[place] catheter and introducer have been  removed. Other lines are unchanged. Vascular congestion persist albeit  improved slightly. Heart size normal considering technique. No  significant pleural fluid. No pneumothorax.             Impression:       1. No pneumothorax following [place]-Lasha catheter removal.  2. Slight improvement in vascular congestion.                XR Chest 1 View [[id number]] Collected:  07/20/18 0718     Updated:  07/21/18 0548    Narrative:       PORTABLE CHEST X-RAY     CLINICAL HISTORY: Post-Op Heart Surgery; I25.10-Atherosclerotic heart  disease of native coronary artery without angina pectoris;  I34.0-Nonrheumatic mitral (valve) insufficiency.     COMPARISON: 07/19/2018.     FINDINGS: Portable AP view of the chest was obtained with overlying  monitor leads in place. ET tube has been removed. The other lines are  unchanged. There is mild vascular congestion. No active airspace disease  or significant pleural fluid. Heart size normal considering technique.             Impression:       Interval extubation with development of mild vascular  congestion.        This report was finalized on 7/21/2018 5:44 AM by Shakeel Lin M.D.       XR Chest 1 View [[id number]] Collected:  07/19/18 1232      Updated:  07/19/18 1241    Narrative:       XR CHEST 1 VW-     HISTORY: Female who is 53 years-old,  postsurgical evaluation     TECHNIQUE: Frontal view of the chest     COMPARISON: 7/18/2018     FINDINGS: Endotracheal tube tip projects 2.7 cm above the mónica. Right  IJ [place]-Lasha catheter extends to the right main pulmonary artery.  Sternotomy wires are noted. Cardiac valve marker is evident. Left chest  tube is noted. Heart, mediastinum and pulmonary vasculature are  unremarkable. Small likely atelectasis in the lower lungs. No pleural  effusion, or pneumothorax. No acute osseous process.       Impression:       Postsurgical changes.     This report was finalized on 7/19/2018 12:38 PM by Dr. Brian Guan M.D.             Medication Review:       Current Facility-Administered Medications:   •  acetaminophen (TYLENOL) suppository 650 mg, 650 mg, Rectal, Q4H PRN, Kevin Tobias MD  •  acetaminophen (TYLENOL) tablet 650 mg, 650 mg, Oral, Q4H PRN, Kevin Tobias MD, 650 mg at 07/21/18 1011  •  ALPRAZolam (XANAX) tablet 0.25 mg, 0.25 mg, Oral, Q8H PRN, Kevin Tobias MD  •  aspirin EC tablet 81 mg, 81 mg, Oral, Daily, Kevin Tobias MD, 81 mg at 07/21/18 0910  •  bisacodyl (DULCOLAX) EC tablet 10 mg, 10 mg, Oral, Daily PRN, Kevin Tobias MD  •  bisacodyl (DULCOLAX) suppository 10 mg, 10 mg, Rectal, Daily PRN, Kevin Tobias MD  •  budesonide-formoterol (SYMBICORT) 160-4.5 MCG/ACT inhaler 2 puff, 2 puff, Inhalation, BID - RT, Vianey Arnold APRN, 2 puff at 07/21/18 0929  •  buPROPion XL (WELLBUTRIN XL) 24 hr tablet 450 mg, 450 mg, Oral, Daily, Kevin Tobias MD, 450 mg at 07/21/18 0910  •  chlorhexidine (PERIDEX) 0.12 % solution 15 mL, 15 mL, Mouth/Throat, Q12H, Kevin Tobias MD, 15 mL at 07/21/18 0029  •  clonazePAM (KlonoPIN) tablet 1 mg, 1 mg, Oral, 4x Daily PRN, Kevin Tobias MD, 1 mg at 07/19/18 1643  •  cyclobenzaprine (FLEXERIL) tablet 10 mg, 10 mg, Oral, Q8H PRN, Kevin Tobias MD  •   enoxaparin (LOVENOX) syringe 40 mg, 40 mg, Subcutaneous, Daily, Kevin Tobias MD, 40 mg at 07/21/18 0909  •  gabapentin (NEURONTIN) capsule 600 mg, 600 mg, Oral, Q8H PRN, KALYANI Sims  •  HYDROcodone-acetaminophen (NORCO) 5-325 MG per tablet 2 tablet, 2 tablet, Oral, Q4H PRN, Kevin Tobias MD, 2 tablet at 07/20/18 0531  •  insulin aspart (novoLOG) injection 2-5 Units, 2-5 Units, Subcutaneous, 4x Daily AC & at Bedtime, Delio NAIK MD, 3 Units at 07/21/18 0701  •  insulin detemir (LEVEMIR) injection 20 Units, 20 Units, Subcutaneous, Q12H, Delio NAIK MD  •  levothyroxine (SYNTHROID, LEVOTHROID) tablet 200 mcg, 200 mcg, Oral, Daily, Kevin Tobias MD, 200 mcg at 07/21/18 0910  •  Magnesium Sulfate 2 gram infusion - Mg less than or equal to 1.5 mg/dL, 2 g, Intravenous, PRN **OR** Magesium Sulfate 1 gram infusion - Mg 1.6-1.9 mg/dL, 1 g, Intravenous, PRN, Kevin Tobias MD  •  magnesium hydroxide (MILK OF MAGNESIA) suspension 2400 mg/10mL 10 mL, 10 mL, Oral, Daily PRN, Kevin Tobias MD  •  metoprolol tartrate (LOPRESSOR) tablet 12.5 mg, 12.5 mg, Oral, Q12H, Kevin Tobias MD, 12.5 mg at 07/21/18 0910  •  montelukast (SINGULAIR) tablet 10 mg, 10 mg, Oral, Nightly, Kevin Tobias MD, 10 mg at 07/20/18 2149  •  morphine injection 1 mg, 1 mg, Intravenous, Q4H PRN **AND** naloxone (NARCAN) injection 0.4 mg, 0.4 mg, Intravenous, Q5 Min PRN, Kevin Tobias MD  •  mupirocin (BACTROBAN) 2 % nasal ointment, , Each Nare, BID, Kevin Tobias MD, 10 application at 07/21/18 0911  •  ondansetron (ZOFRAN) injection 4 mg, 4 mg, Intravenous, Q6H PRN, Kevin Tobias MD  •  oxyCODONE (ROXICODONE) immediate release tablet 10 mg, 10 mg, Oral, Q4H PRN, Kevin Tobias MD, 5 mg at 07/21/18 1011  •  pantoprazole (PROTONIX) EC tablet 40 mg, 40 mg, Oral, Daily, Kevin Tobias MD, 40 mg at 07/21/18 0911  •  potassium chloride (MICRO-K) CR capsule 40 mEq, 40 mEq, Oral, PRN **OR** potassium  chloride (KLOR-CON) packet 40 mEq, 40 mEq, Oral, PRN, Kevin Tobias MD  •  potassium chloride (MICRO-K) CR capsule 20 mEq, 20 mEq, Oral, Daily, Vianey Arnold, APRN, 20 mEq at 07/21/18 0917  •  potassium chloride 10 mEq in 100 mL IVPB, 10 mEq, Intravenous, Q1H PRN **OR** potassium chloride 10 mEq in 100 mL IVPB, 10 mEq, Intravenous, Q1H PRN, Kevin Tobias MD  •  potassium chloride 20 mEq in 50 mL IVPB, 20 mEq, Intravenous, Q1H PRN, Kevin Tobais MD  •  potassium chloride 20 mEq in 50 mL IVPB, 20 mEq, Intravenous, Q1H PRN, Kevin Tobias MD, Last Rate: 50 mL/hr at 07/19/18 1238, 20 mEq at 07/19/18 1238  •  potassium chloride 20 mEq in 50 mL IVPB, 20 mEq, Intravenous, Q1H PRN, Kevin Tobias MD  •  promethazine (PHENERGAN) tablet 12.5 mg, 12.5 mg, Oral, Q6H PRN **OR** promethazine (PHENERGAN) injection 12.5 mg, 12.5 mg, Intravenous, Q6H PRN, Kevin Tobias MD  •  QUEtiapine (SEROquel) tablet 200 mg, 200 mg, Oral, Nightly, Kevin Tobias MD, 200 mg at 07/20/18 2148  •  sennosides-docusate sodium (SENOKOT-S) 8.6-50 MG tablet 2 tablet, 2 tablet, Oral, Nightly, Kevin Tobias MD, 2 tablet at 07/20/18 2149  •  sodium chloride 0.9 % flush 30 mL, 30 mL, Intravenous, Once PRN, Kevin Tobias MD  •  topiramate (TOPAMAX) tablet 300 mg, 300 mg, Oral, Nightly, Kevin Tobias MD, 300 mg at 07/20/18 2308  •  Vortioxetine HBr 5 MG 5 mg, 5 mg, Oral, Daily With Breakfast, Kevin Tobias MD, 5 mg at 07/21/18 0911    Assessment/Plan     Patient Active Problem List   Diagnosis   • Severe mitral regurgitation   • Coronary artery disease involving native coronary artery of native heart with angina pectoris (CMS/HCC)   • Mitral valve insufficiency   • Coronary artery disease involving native heart without angina pectoris     Uncontrolled type 2 diabetes mellitus  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  She improved significantly  Her last hemoglobin A1c was in the 8% range     Patient is currently on liquid diet  She is not eating much   appetite appears to be poor  I will continue the Levemir 20 units every 12 hours and a sliding scale  Once the patient's appetite improved since he starts eating and will titrate adjust insulin once again  Patient is still not keen on going on insulin pump for today    The total time spent for old record and lab review and floor time was more than 35 min of which greater than 20 min of time  ( greater than 50% of the total time )  was spent face to face with the patient counseling and coordination of care owas spent on counseling the patient on recommended evaluation and treatment options, instructions for management/treatment and /or follow up  and importance of compliance with chosen management or treatment options      patt Watts MD FACE.  07/21/18  10:17 AM      EMR Dragon / transcription disclaimer:     \"Dictated utilizing Dragon dictation\".   "

## 2018-07-21 NOTE — PLAN OF CARE
Problem: Patient Care Overview  Goal: Plan of Care Review  Outcome: Ongoing (interventions implemented as appropriate)   07/21/18 3162   Coping/Psychosocial   Plan of Care Reviewed With patient   Plan of Care Review   Progress improving   OTHER   Outcome Summary VSS. NSR on monitor. Rested well over night. O2 weaned to 0.5L with sats>90%. Will continue to monitor.

## 2018-07-21 NOTE — PROGRESS NOTES
" LOS: 2 days   Patient Care Team:  Charlotte Johnson MD as PCP - General  Charlotte Johnson MD as PCP - Family Medicine  Janeth Young MD as Consulting Physician (Endocrinology)  Jim Schafer MD as Consulting Physician (Rheumatology)  Hari Merritt MD as Consulting Physician (Pain Medicine)  Cesar Remy MD as Consulting Physician (Psychiatry)  Jesus James MD as Consulting Physician (Cardiology)    Chief Complaint: post op fu    Subjective:  Symptoms:  No shortness of breath.    Diet:  No nausea or vomiting.    Activity level: Impaired due to pain.    Pain:  Pain is requiring pain medication and partially controlled.          Vital Signs  Temp:  [98 °F (36.7 °C)-99.7 °F (37.6 °C)] 98 °F (36.7 °C)  Heart Rate:  [72-82] 82  Resp:  [16-22] 16  BP: (108-135)/(63-78) 109/69  Body mass index is 37.95 kg/m².    Intake/Output Summary (Last 24 hours) at 07/21/18 0850  Last data filed at 07/21/18 0700   Gross per 24 hour   Intake             2344 ml   Output             1160 ml   Net             1184 ml     No intake/output data recorded.    Chest tube drainage last 8 hours: 230 (no air leak)    1    07/19/18  1325 07/19/18  1405 07/21/18  0500   Weight: 97.3 kg (214 lb 8.1 oz) 97.3 kg (214 lb 8.1 oz) 100 kg (221 lb 3.2 oz)         Objective:  General Appearance:  In no acute distress.    Vital signs: (most recent): Blood pressure 109/69, pulse 82, temperature 98 °F (36.7 °C), temperature source Oral, resp. rate 16, height 162.6 cm (64.02\"), weight 100 kg (221 lb 3.2 oz), last menstrual period 06/28/2018, SpO2 97 %, not currently breastfeeding.    Output: Producing urine and no stool output.    Lungs:  Normal effort and normal respiratory rate.  There are rales (bilateral lower lobes) and decreased breath sounds (shallow breathing).  No rhonchi.    Heart: Normal rate.  Regular rhythm.  S1 normal and S2 normal.  Positive for friction rub.  No gallop.   Abdomen: Abdomen is soft.  "   Extremities: There is dependent edema (1+ pretibial).    Pulses: Distal pulses are intact.    Neurological: Patient is oriented to person, place and time.  (Drowsy).    Skin:  Warm and dry.  (Sternal and leg dressings dry and intact)            Results Review:        WBC WBC   Date Value Ref Range Status   07/21/2018 12.71 (H) 4.50 - 10.70 10*3/mm3 Final   07/20/2018 11.21 (H) 4.50 - 10.70 10*3/mm3 Final   07/19/2018 14.62 (H) 4.50 - 10.70 10*3/mm3 Final   07/19/2018 10.70 4.50 - 10.70 10*3/mm3 Final      HGB Hemoglobin   Date Value Ref Range Status   07/21/2018 11.0 (L) 11.9 - 15.5 g/dL Final   07/20/2018 10.9 (L) 11.9 - 15.5 g/dL Final   07/19/2018 11.5 (L) 11.9 - 15.5 g/dL Final   07/19/2018 11.4 (L) 11.9 - 15.5 g/dL Final      HCT Hematocrit   Date Value Ref Range Status   07/21/2018 34.2 (L) 35.6 - 45.5 % Final   07/20/2018 34.3 (L) 35.6 - 45.5 % Final   07/19/2018 36.2 35.6 - 45.5 % Final   07/19/2018 35.4 (L) 35.6 - 45.5 % Final      Platelets Platelets   Date Value Ref Range Status   07/21/2018 101 (L) 140 - 500 10*3/mm3 Final   07/20/2018 84 (L) 140 - 500 10*3/mm3 Final   07/19/2018 101 (L) 140 - 500 10*3/mm3 Final   07/19/2018 115 (L) 140 - 500 10*3/mm3 Final        PT/INR:    Protime   Date Value Ref Range Status   07/20/2018 16.5 (H) 11.7 - 14.2 Seconds Final   07/19/2018 17.9 (H) 11.7 - 14.2 Seconds Final   /  INR   Date Value Ref Range Status   07/20/2018 1.36 (H) 0.90 - 1.10 Final   07/19/2018 1.51 (H) 0.90 - 1.10 Final       Sodium Sodium   Date Value Ref Range Status   07/21/2018 132 (L) 136 - 145 mmol/L Final   07/20/2018 142 136 - 145 mmol/L Final   07/19/2018 141 136 - 145 mmol/L Final   07/19/2018 139 136 - 145 mmol/L Final      Potassium Potassium   Date Value Ref Range Status   07/21/2018 4.3 3.5 - 5.2 mmol/L Final   07/20/2018 3.7 3.5 - 5.2 mmol/L Final   07/19/2018 3.7 3.5 - 5.2 mmol/L Final   07/19/2018 3.8 3.5 - 5.2 mmol/L Final   07/19/2018 3.6 3.5 - 5.2 mmol/L Final      Chloride  Chloride   Date Value Ref Range Status   07/21/2018 99 98 - 107 mmol/L Final   07/20/2018 108 (H) 98 - 107 mmol/L Final   07/19/2018 106 98 - 107 mmol/L Final   07/19/2018 104 98 - 107 mmol/L Final      Bicarbonate CO2   Date Value Ref Range Status   07/21/2018 21.0 (L) 22.0 - 29.0 mmol/L Final   07/20/2018 22.0 22.0 - 29.0 mmol/L Final   07/19/2018 23.2 22.0 - 29.0 mmol/L Final   07/19/2018 21.2 (L) 22.0 - 29.0 mmol/L Final      BUN BUN   Date Value Ref Range Status   07/21/2018 20 6 - 20 mg/dL Final   07/20/2018 16 6 - 20 mg/dL Final   07/19/2018 13 6 - 20 mg/dL Final   07/19/2018 14 6 - 20 mg/dL Final      Creatinine Creatinine   Date Value Ref Range Status   07/21/2018 1.17 (H) 0.57 - 1.00 mg/dL Final   07/20/2018 1.07 (H) 0.57 - 1.00 mg/dL Final   07/19/2018 1.26 (H) 0.57 - 1.00 mg/dL Final   07/19/2018 1.24 (H) 0.57 - 1.00 mg/dL Final      Calcium Calcium   Date Value Ref Range Status   07/21/2018 8.6 8.6 - 10.5 mg/dL Final   07/20/2018 8.4 (L) 8.6 - 10.5 mg/dL Final   07/19/2018 8.4 (L) 8.6 - 10.5 mg/dL Final   07/19/2018 8.9 8.6 - 10.5 mg/dL Final      Magnesium Magnesium   Date Value Ref Range Status   07/20/2018 2.6 1.6 - 2.6 mg/dL Final   07/19/2018 2.4 1.6 - 2.6 mg/dL Final   07/19/2018 2.6 1.6 - 2.6 mg/dL Final            aspirin 81 mg Oral Daily   budesonide-formoterol 2 puff Inhalation BID - RT   buPROPion  mg Oral Daily   chlorhexidine 15 mL Mouth/Throat Q12H   enoxaparin 40 mg Subcutaneous Daily   gabapentin 600 mg Oral Q8H   insulin aspart 2-5 Units Subcutaneous 4x Daily AC & at Bedtime   insulin detemir 15 Units Subcutaneous Q12H   levothyroxine 200 mcg Oral Daily   metoprolol tartrate 12.5 mg Oral Q12H   montelukast 10 mg Oral Nightly   mupirocin  Each Nare BID   pantoprazole 40 mg Oral Daily   QUEtiapine 200 mg Oral Nightly   sennosides-docusate sodium 2 tablet Oral Nightly   topiramate 300 mg Oral Nightly   Vortioxetine HBr 5 mg Oral Daily With Breakfast              Patient Active  Problem List   Diagnosis Code   • Severe mitral regurgitation I34.0   • Coronary artery disease involving native coronary artery of native heart with angina pectoris (CMS/[facility]) I25.119   • Mitral valve insufficiency I34.0   • Coronary artery disease involving native heart without angina pectoris I25.10       Assessment & Plan    -Rheumatic mitral regurgitation, CAD s/p CABG x4 with LIMA and MV repair/ring----POD #2 (Pagni)  -preop LV dysfxn, EF 45%  -DM II----insulin pump, endocrinology assisting  -Rheumatoid arthritis----last immunosuppressive injection 6/20/18  -Chronic neck and back pain, fibromyalgia----on neurontin and norco every 6 hours routinely, last epidural injection 6/26/18  -HTN---controlled on current medication  -CKD----1.07 this am, will monitor  -Hypothyroidism  -Asthma---on routine inhalers  -Hx tobacco abuse, 30 pack year history----Quit 3/2018  -Bipolar disorder----well managed on current regimen  -Post op anemia/TCP----expected blood loss, will monitor  -Leukocytosis----reactive, afebrile    Lasix 40 mg IV this am, leave chest tubes with increased drainage.  Increase IS (only pulling 250) and activity.      Vianey Arnold, KALYANI  07/21/18  8:50 AM

## 2018-07-21 NOTE — PLAN OF CARE
Problem: Patient Care Overview  Goal: Plan of Care Review  Outcome: Ongoing (interventions implemented as appropriate)   07/21/18 1809   Coping/Psychosocial   Plan of Care Reviewed With patient   OTHER   Outcome Summary Pt VSS. Mediastinal chest tube removed. Left pleural chest tube remains in place. CXR shows a tiny pneumothorax.  notified. Will continue to closely monitor. Pt complained of pain, medicated per MAR. Avitia removed. at 7/21/2018       Problem: Fall Risk (Adult)  Goal: Absence of Fall  Outcome: Ongoing (interventions implemented as appropriate)   07/21/18 1809   Fall Risk (Adult)   Absence of Fall making progress toward outcome       Problem: Skin Injury Risk (Adult)  Goal: Skin Health and Integrity  Outcome: Ongoing (interventions implemented as appropriate)   07/21/18 1809   Skin Injury Risk (Adult)   Skin Health and Integrity making progress toward outcome       Problem: Cardiac Surgery (Adult)  Goal: Signs and Symptoms of Listed Potential Problems Will be Absent, Minimized or Managed (Cardiac Surgery)  Outcome: Ongoing (interventions implemented as appropriate)   07/21/18 1809   Goal/Outcome Evaluation   Problems Assessed (Cardiac Surgery) all   Problems Present (Cardiac Surgery) pain

## 2018-07-22 ENCOUNTER — APPOINTMENT (OUTPATIENT)
Dept: GENERAL RADIOLOGY | Facility: HOSPITAL | Age: 53
End: 2018-07-22

## 2018-07-22 LAB
ANION GAP SERPL CALCULATED.3IONS-SCNC: 10.5 MMOL/L
BUN BLD-MCNC: 23 MG/DL (ref 6–20)
BUN/CREAT SERPL: 22.8 (ref 7–25)
CALCIUM SPEC-SCNC: 8.8 MG/DL (ref 8.6–10.5)
CHLORIDE SERPL-SCNC: 102 MMOL/L (ref 98–107)
CO2 SERPL-SCNC: 23.5 MMOL/L (ref 22–29)
CREAT BLD-MCNC: 1.01 MG/DL (ref 0.57–1)
DEPRECATED RDW RBC AUTO: 46.9 FL (ref 37–54)
ERYTHROCYTE [DISTWIDTH] IN BLOOD BY AUTOMATED COUNT: 13.7 % (ref 11.7–13)
GFR SERPL CREATININE-BSD FRML MDRD: 58 ML/MIN/1.73
GLUCOSE BLD-MCNC: 136 MG/DL (ref 65–99)
GLUCOSE BLDC GLUCOMTR-MCNC: 137 MG/DL (ref 70–130)
GLUCOSE BLDC GLUCOMTR-MCNC: 155 MG/DL (ref 70–130)
GLUCOSE BLDC GLUCOMTR-MCNC: 172 MG/DL (ref 70–130)
GLUCOSE BLDC GLUCOMTR-MCNC: 186 MG/DL (ref 70–130)
HCT VFR BLD AUTO: 32.4 % (ref 35.6–45.5)
HGB BLD-MCNC: 10.3 G/DL (ref 11.9–15.5)
MCH RBC QN AUTO: 29.9 PG (ref 26.9–32)
MCHC RBC AUTO-ENTMCNC: 31.8 G/DL (ref 32.4–36.3)
MCV RBC AUTO: 94.2 FL (ref 80.5–98.2)
PLATELET # BLD AUTO: 97 10*3/MM3 (ref 140–500)
PMV BLD AUTO: 12 FL (ref 6–12)
POTASSIUM BLD-SCNC: 4 MMOL/L (ref 3.5–5.2)
RBC # BLD AUTO: 3.44 10*6/MM3 (ref 3.9–5.2)
SODIUM BLD-SCNC: 136 MMOL/L (ref 136–145)
WBC NRBC COR # BLD: 10.13 10*3/MM3 (ref 4.5–10.7)

## 2018-07-22 PROCEDURE — 63710000001 INSULIN ASPART PER 5 UNITS: Performed by: INTERNAL MEDICINE

## 2018-07-22 PROCEDURE — 94799 UNLISTED PULMONARY SVC/PX: CPT

## 2018-07-22 PROCEDURE — 99024 POSTOP FOLLOW-UP VISIT: CPT | Performed by: NURSE PRACTITIONER

## 2018-07-22 PROCEDURE — 85027 COMPLETE CBC AUTOMATED: CPT | Performed by: THORACIC SURGERY (CARDIOTHORACIC VASCULAR SURGERY)

## 2018-07-22 PROCEDURE — 63710000001 INSULIN DETEMIR PER 5 UNITS: Performed by: INTERNAL MEDICINE

## 2018-07-22 PROCEDURE — 82962 GLUCOSE BLOOD TEST: CPT

## 2018-07-22 PROCEDURE — 99232 SBSQ HOSP IP/OBS MODERATE 35: CPT | Performed by: NURSE PRACTITIONER

## 2018-07-22 PROCEDURE — 71046 X-RAY EXAM CHEST 2 VIEWS: CPT

## 2018-07-22 PROCEDURE — 99024 POSTOP FOLLOW-UP VISIT: CPT | Performed by: THORACIC SURGERY (CARDIOTHORACIC VASCULAR SURGERY)

## 2018-07-22 PROCEDURE — 97110 THERAPEUTIC EXERCISES: CPT

## 2018-07-22 PROCEDURE — 80048 BASIC METABOLIC PNL TOTAL CA: CPT | Performed by: THORACIC SURGERY (CARDIOTHORACIC VASCULAR SURGERY)

## 2018-07-22 PROCEDURE — 25010000002 ENOXAPARIN PER 10 MG: Performed by: THORACIC SURGERY (CARDIOTHORACIC VASCULAR SURGERY)

## 2018-07-22 PROCEDURE — 25010000002 FUROSEMIDE PER 20 MG: Performed by: NURSE PRACTITIONER

## 2018-07-22 RX ORDER — FUROSEMIDE 10 MG/ML
40 INJECTION INTRAMUSCULAR; INTRAVENOUS ONCE
Status: COMPLETED | OUTPATIENT
Start: 2018-07-22 | End: 2018-07-22

## 2018-07-22 RX ADMIN — POTASSIUM CHLORIDE 20 MEQ: 750 CAPSULE, EXTENDED RELEASE ORAL at 09:46

## 2018-07-22 RX ADMIN — GUAIFENESIN 1200 MG: 600 TABLET, EXTENDED RELEASE ORAL at 20:03

## 2018-07-22 RX ADMIN — OXYCODONE HYDROCHLORIDE 5 MG: 5 TABLET ORAL at 07:35

## 2018-07-22 RX ADMIN — ACETAMINOPHEN 650 MG: 325 TABLET, FILM COATED ORAL at 11:56

## 2018-07-22 RX ADMIN — DOCUSATE SODIUM -SENNOSIDES 2 TABLET: 50; 8.6 TABLET, COATED ORAL at 20:04

## 2018-07-22 RX ADMIN — QUETIAPINE FUMARATE 200 MG: 200 TABLET, FILM COATED ORAL at 20:04

## 2018-07-22 RX ADMIN — OXYCODONE HYDROCHLORIDE 5 MG: 5 TABLET ORAL at 00:39

## 2018-07-22 RX ADMIN — FUROSEMIDE 40 MG: 10 INJECTION, SOLUTION INTRAMUSCULAR; INTRAVENOUS at 15:06

## 2018-07-22 RX ADMIN — METOPROLOL TARTRATE 12.5 MG: 25 TABLET ORAL at 20:03

## 2018-07-22 RX ADMIN — TOPIRAMATE 300 MG: 100 TABLET, FILM COATED ORAL at 20:03

## 2018-07-22 RX ADMIN — INSULIN DETEMIR 25 UNITS: 100 INJECTION, SOLUTION SUBCUTANEOUS at 20:48

## 2018-07-22 RX ADMIN — OXYCODONE HYDROCHLORIDE 10 MG: 5 TABLET ORAL at 23:45

## 2018-07-22 RX ADMIN — OXYCODONE HYDROCHLORIDE 5 MG: 5 TABLET ORAL at 11:56

## 2018-07-22 RX ADMIN — PANTOPRAZOLE SODIUM 40 MG: 40 TABLET, DELAYED RELEASE ORAL at 09:52

## 2018-07-22 RX ADMIN — ACETAMINOPHEN 650 MG: 325 TABLET, FILM COATED ORAL at 17:40

## 2018-07-22 RX ADMIN — BUDESONIDE AND FORMOTEROL FUMARATE DIHYDRATE 2 PUFF: 160; 4.5 AEROSOL RESPIRATORY (INHALATION) at 11:35

## 2018-07-22 RX ADMIN — BUPROPION HYDROCHLORIDE 450 MG: 150 TABLET, EXTENDED RELEASE ORAL at 09:46

## 2018-07-22 RX ADMIN — INSULIN DETEMIR 25 UNITS: 100 INJECTION, SOLUTION SUBCUTANEOUS at 09:47

## 2018-07-22 RX ADMIN — GUAIFENESIN 1200 MG: 600 TABLET, EXTENDED RELEASE ORAL at 09:46

## 2018-07-22 RX ADMIN — MUPIROCIN 10 APPLICATION: 20 OINTMENT TOPICAL at 20:03

## 2018-07-22 RX ADMIN — OXYCODONE HYDROCHLORIDE 5 MG: 5 TABLET ORAL at 17:40

## 2018-07-22 RX ADMIN — BUDESONIDE AND FORMOTEROL FUMARATE DIHYDRATE 2 PUFF: 160; 4.5 AEROSOL RESPIRATORY (INHALATION) at 19:10

## 2018-07-22 RX ADMIN — MONTELUKAST 10 MG: 10 TABLET, FILM COATED ORAL at 20:03

## 2018-07-22 RX ADMIN — METOPROLOL TARTRATE 12.5 MG: 25 TABLET ORAL at 09:47

## 2018-07-22 RX ADMIN — MUPIROCIN 10 APPLICATION: 20 OINTMENT TOPICAL at 09:47

## 2018-07-22 RX ADMIN — INSULIN ASPART 2 UNITS: 100 INJECTION, SOLUTION INTRAVENOUS; SUBCUTANEOUS at 11:50

## 2018-07-22 RX ADMIN — ACETAMINOPHEN 650 MG: 325 TABLET, FILM COATED ORAL at 00:39

## 2018-07-22 RX ADMIN — LEVOTHYROXINE SODIUM 200 MCG: 100 TABLET ORAL at 09:46

## 2018-07-22 RX ADMIN — ACETAMINOPHEN 650 MG: 325 TABLET, FILM COATED ORAL at 07:35

## 2018-07-22 RX ADMIN — ASPIRIN 81 MG: 81 TABLET ORAL at 09:46

## 2018-07-22 NOTE — PROGRESS NOTES
Cardiology Progress Note    Patient Identification:  Name: Angeles Oquendo  Age: 53 y.o.  Sex: female  :  1965  MRN: [id number]                 Follow Up / Chief Complaint: severe MR, CAD,    Interval History:  Subjective:  Denies nausea, vomiting or abdominal pain.  No BM yet.  Denies chest pain or tightness.  Loose nonproductive cough.    Objective:  SR 70s-80s, no ectopy  -120s/ 59-80's  Afebrile    (-) 1.1L net fluid balance . Additional 1100 cc u/o / 24hrs after IV Lasix yesterday    BUN 23, Cr 1.01    Past Medical History:  Past Medical History:   Diagnosis Date   • Anesthesia complication     WITH LAURO TOOK A  LOT TO GET HER TO SLEEP   • Asthma    • Bipolar I disorder, single manic episode (CMS/HCC)    • Bursitis     LEFT ELBOW   • Chronic pain syndrome     SEES DR GODWIN NOLASCO FOR PAIN   • Coronary artery disease    • Diabetes mellitus (CMS/HCC)     TYPE 2. HAS INSULIN PUMP WITH CONT BASAL RATE   • Disc degeneration, lumbar    • Esophageal reflux    • Fibromyalgia    • H/O seasonal allergies    • History of sleep apnea      SLEEP STUDY. HAD MACHINE THEN. USED 3YRS. SLEEP STUDY REPEATED. WAS OKED NOT TO USE MACHING ANY MORE   • Hyperlipidemia    • Hypertension    • Hypothyroidism    • Mitral regurgitation    • PCOS (polycystic ovarian syndrome)    • Rheumatoid arthritis (CMS/HCC)    • Statin intolerance     Severe myalgias    • Vitamin D deficiency      Past Surgical History:  Past Surgical History:   Procedure Laterality Date   • CARDIAC CATHETERIZATION N/A 2018    Procedure: Right and Left Heart Cath and cors;  Surgeon: Stalin Handy MD;  Location: [facility] CATH INVASIVE LOCATION;  Service: Cardiovascular   • CARDIAC CATHETERIZATION N/A 2018    Procedure: Coronary angiography;  Surgeon: Stalin Handy MD;  Location: [facility] CATH INVASIVE LOCATION;  Service: Cardiovascular   • CARDIAC CATHETERIZATION N/A 2018    Procedure: Left ventriculography;  Surgeon: Stalin MARQUEZ  MD Ole;  Location: [facility] INVASIVE LOCATION;  Service: Cardiovascular   •  SECTION      X2   • CORONARY ARTERY BYPASS GRAFT WITH MITRAL VALVE REPAIR/REPLACEMENT N/A 2018    Procedure: LAURO STERNOTOMY CORONARY ARTERY BYPASS GRAFT TIMES 4 USING LEFT INTERNAL MAMMARY ARTERY AND RIGHT GREATER SAPHENOUS VEIN GRAFT PER ENDOSCOPIC VEIN HARVESTING, MITRAL VALVE REPAIR AND PRP;  Surgeon: Kevin Tobias MD;  Location: [facility] OR;  Service: Cardiothoracic   • ENDOMETRIAL ABLATION      X2   • TONSILLECTOMY     • UTERINE FIBROID SURGERY      Ablasion        Social History:   Social History   Substance Use Topics   • Smoking status: Former Smoker     Packs/day: 1.00     Years: 30.00     Types: Cigarettes     Quit date: 3/28/2018   • Smokeless tobacco: Never Used   • Alcohol use No      Family History:  Family History   Problem Relation Age of Onset   • Anemia Other    • Arthritis Other    • Asthma Other    • Depression Other         with anxiety   • Diabetes Other    • Hypertension Other    • Allergies Other    • Heart disease Other    • Diabetes Father    • Stroke Paternal Grandmother    • Heart attack Paternal Grandfather    • Diabetes Paternal Grandfather    • Malig Hyperthermia Neg Hx           Allergies:  Allergies   Allergen Reactions   • Statins Myalgia   • Sulfa Antibiotics Rash     Scheduled Meds:    aspirin 81 mg Daily   budesonide-formoterol 2 puff BID - RT   buPROPion  mg Daily   chlorhexidine 15 mL Q12H   enoxaparin 40 mg Daily   guaiFENesin 1,200 mg Q12H   insulin aspart 2-5 Units 4x Daily AC & at Bedtime   insulin aspart 8 Units TID With Meals   insulin detemir 25 Units Q12H   levothyroxine 200 mcg Daily   metoprolol tartrate 12.5 mg Q12H   montelukast 10 mg Nightly   mupirocin  BID   pantoprazole 40 mg Daily   potassium chloride 20 mEq Daily   QUEtiapine 200 mg Nightly   sennosides-docusate sodium 2 tablet Nightly   topiramate 300 mg Nightly   Vortioxetine HBr 5 mg Daily With  Breakfast           INTAKE AND OUTPUT:    Intake/Output Summary (Last 24 hours) at 07/22/18 1054  Last data filed at 07/22/18 0658   Gross per 24 hour   Intake              920 ml   Output             1170 ml   Net             -250 ml       Review of Systems:  GI:  no nausea or vomiting  Cardiac: Sinus rhythm, no ectopy  Pulmonary: Loose, nonproductive cough. no shortness of breath    Constitutional:  Temp:  [98.3 °F (36.8 °C)-98.6 °F (37 °C)] 98.3 °F (36.8 °C)  Heart Rate:  [72-77] 75  Resp:  [18] 18  BP: (104-148)/(59-88) 104/59    Physical Exam:  General:  Well-developed, well-nourished white female sitting up at bedside.  Appears in no acute distress  Eyes: PERTL,  HEENT:  No JVD. Thyroid not visibly enlarged.  Oral mucosa moist, no cyanosis  Respiratory: Respirations regular and unlabored at rest on O2 per nasal cannula.. Moderate amount serous drainage in remaining chest tube.  BBS with diminished air entry in bases. Loose nonproductive cough.    Few bibasilar crackles, no  wheezes auscultated  Cardiovascular: S1S2 Regular rate and rhythm.   No rub or gallop. No pretibial pitting edema  Gastrointestinal: Abdomen soft, non distended and non tender.  Faint, bowel sounds present.   Musculoskeletal: GARRETT x4. No abnormal movements  Extremities: No digital clubbing or cyanosis  Skin: Color pale, pink. Skin warm and dry to touch.  Neuro: AAO x3 CN II-XII grossly intact  Psych: Mood and affect normal, pleasant and cooperative        Cardiographics  Telemetry:         PRE op Echocardiogram:   · The left ventricular cavity is moderately dilated.  · There is moderate hypokinesis of the inferior wall. There is severe hypokinesis of the basal to mid inferoseptum  · Left ventricular systolic function is mildly decreased. Estimated EF = 45%.  · Left ventricular diastolic dysfunction is noted (grade II w/high LAP) consistent with pseudonormalization.  · Left atrial cavity size is moderately dilated  · The mitral valve leaflets  are thickened at the tips. The posterior mitral valve leaflet is severely restricted in motion.  · There is severe mitral regurgitation that essentially fills the entire left atrium. There is flow reversal in 2 of the 4 pulmonary veins  · Mild tricuspid valve regurgitation is present.  · Calculated right ventricular systolic pressure from tricuspid regurgitation is 29 mmHg.  · There are mild plaques in the ascending aorta and aortic arch..  · There is no evidence of pericardial effusion      CXR 7-22-18 IMPRESSION:  Persistent small left apical pneumothorax patchy densities at the mid to lower lungs, left pleural effusion, continued follow-up recommended.    Lab Review   Results from last 7 days  Lab Units 07/22/18  0331   SODIUM mmol/L 136   POTASSIUM mmol/L 4.0   BUN mg/dL 23*   CREATININE mg/dL 1.01*   CALCIUM mg/dL 8.8     Results from last 7 days  Lab Units 07/22/18  0331 07/21/18  0415 07/20/18  0300   WBC 10*3/mm3 10.13 12.71* 11.21*   HEMOGLOBIN g/dL 10.3* 11.0* 10.9*   HEMATOCRIT % 32.4* 34.2* 34.3*   PLATELETS 10*3/mm3 97* 101* 84*         Assessment:  - CAD  - severe MR  - s/p 4v CABG with LIMA and MV repair/ring  - EF 45%  - HTN  - CKD  - Leukocytosis  - Post op anemia, thrombocytopenia  - Rheumatoid arthritis    Plan:  - CAD, severe MR -  POD # 3  4v CABG and MV repair/ring.  Remains on supplemental oxygen.  One chest tube remains with persistent, small left apical pneumothorax and pleural effusions on today's chest x-ray   Additional IV Lasix today.  No BM yet.  On aspirin, DVT Lovenox, Lopressor    - EF 45% - additional IV Lasix today.  On aspirin, beta blocker.     - HTN - -120s/ 59-80's.  On low-dose Lopressor and IV Lasix    Agree with repeat IV Lasix.  Creatinine remained stable.  Titrate up beta blocker as blood pressure tolerates.  Monitor renal function.  Patient's primary cardiologist, Dr. James to assume care tomorrow     Labs/tests ordered:  BMP      )7/22/2018  Tiffanie Mead,  "KALYANI Artis/Transcription:   \"Dictated utilizing Dragon dictation\".     "

## 2018-07-22 NOTE — THERAPY TREATMENT NOTE
Acute Care - Physical Therapy Treatment Note  [facility]     Patient Name: Angeles Oquendo  : 1965  MRN: [id number]  Today's Date: 2018  Onset of Illness/Injury or Date of Surgery: 18  Date of Referral to PT: 18  Referring Physician: Jatinder    Admit Date: 2018    Visit Dx:    ICD-10-CM ICD-9-CM   1. Coronary artery disease involving native coronary artery of native heart with angina pectoris (CMS/HCC) I25.119 414.01     413.9   2. Coronary artery disease involving native heart without angina pectoris, unspecified vessel or lesion type I25.10 414.01   3. Mitral valve insufficiency, unspecified etiology I34.0 424.0     Patient Active Problem List   Diagnosis   • Severe mitral regurgitation   • Coronary artery disease involving native coronary artery of native heart with angina pectoris (CMS/HCC)   • Mitral valve insufficiency   • Coronary artery disease involving native heart without angina pectoris       Therapy Treatment          Rehabilitation Treatment Summary     Row Name 18             Treatment Time/Intention    Discipline physical therapy assistant  -CW      Document Type therapy note (daily note)  -CW      Subjective Information complains of;fatigue  -CW      Mode of Treatment physical therapy  -CW      Therapy Frequency (PT Clinical Impression) daily  -CW      Patient Effort good  -CW      Existing Precautions/Restrictions fall;cardiac;sternal  -CW      Recorded by [CW] Paulino Kelly, VON 18      Row Name 18             Vital Signs    O2 Delivery Pre Treatment supplemental O2  -CW      O2 Delivery Intra Treatment room air  -CW      O2 Delivery Post Treatment supplemental O2  -CW      Recorded by [CW] Paulino Kelly PTA 18      Row Name 18             Cognitive Assessment/Intervention- PT/OT    Orientation Status (Cognition) oriented x 3  -CW      Follows Commands (Cognition) WNL  -CW      Personal Safety  Interventions fall prevention program maintained;gait belt;muscle strengthening facilitated;nonskid shoes/slippers when out of bed  -CW      Recorded by [CW] Paulino Kelly, [facility] 07/22/18 0932      Row Name 07/22/18 0900             Bed Mobility Assessment/Treatment    Sit-Supine [place] (Bed Mobility) not tested  -CW      Comment (Bed Mobility) up in chair  -CW      Recorded by [CW] Paulino Kelly, [facility] 07/22/18 0932      Row Name 07/22/18 0900             Transfer Assessment/Treatment    Transfer Assessment/Treatment sit-stand transfer;stand-sit transfer  -CW      Recorded by [CW] Paulino Kelly, [facility] 07/22/18 0932      Row Name 07/22/18 0900             Sit-Stand Transfer    Sit-Stand [place] (Transfers) stand by assist  -CW      Recorded by [CW] Paulino Kelly, [facility] 07/22/18 0932      Row Name 07/22/18 0900             Stand-Sit Transfer    Stand-Sit [place] (Transfers) stand by assist  -CW      Recorded by [CW] Paulino Kelly, [facility] 07/22/18 0932      Row Name 07/22/18 0900             Gait/Stairs Assessment/Training    [place] Level (Gait) stand by assist  -CW      Distance in Feet (Gait) 100  -CW      Pattern (Gait) swing-through  -CW      Recorded by [CW] Paulino Kelly, [facility] 07/22/18 0932      Row Name 07/22/18 0900             Positioning and Restraints    Pre-Treatment Position sitting in chair/recliner  -CW      Post Treatment Position chair  -CW      In Chair notified nsg;sitting;call light within reach;encouraged to call for assist  -CW      Recorded by [CW] Paulino Kelly, [facility] 07/22/18 0932      Row Name 07/22/18 0900             Pain Assessment    Additional Documentation Pain Scale: Numbers Pre/Post-Treatment (Group)  -CW      Recorded by [CW] Paulino Kelly, [facility] 07/22/18 0932      Row Name 07/22/18 0900             Pain Scale: Numbers Pre/Post-Treatment    Pain Scale: Numbers, Pretreatment 2/10  -CW      Pain Scale: Numbers, Post-Treatment 4/10  -CW       Pain Location - Orientation incisional  -CW      Pain Location chest  -CW      Pain Intervention(s) Repositioned;Ambulation/increased activity  -CW      Recorded by [CW] Paulino Kelly PTA 07/22/18 0932      Row Name                Wound 07/19/18 0957 chest incision    Wound - Properties Group Date first assessed: 07/19/18 [SR] Time first assessed: 0957 [SR] Location: chest [SR] Type: incision [SR] Recorded by:  [SR] Jackie Dia RN 07/19/18 0957    Row Name                Wound 07/19/18 0957 Right leg incision    Wound - Properties Group Date first assessed: 07/19/18 [SR] Time first assessed: 0957 [SR] Side: Right [SR] Location: leg [SR] Type: incision [SR] Recorded by:  [SR] Jackie Dia RN 07/19/18 0957    Row Name 07/22/18 0900             Outcome Summary/Treatment Plan (PT)    Anticipated Discharge Disposition (PT) home with home health  -CW      Recorded by [CW] Paulino Kelly PTA 07/22/18 0932        User Key  (r) = Recorded By, (t) = Taken By, (c) = Cosigned By    Initials Name Effective Dates Discipline    SR Jackie Dia RN 06/16/16 -  Nurse    CW Paulino Kelly, VON 03/07/18 -  PT          Wound 07/19/18 0957 chest incision (Active)   Dressing Appearance dry;intact 7/22/2018  7:35 AM   Closure SANDRA 7/22/2018  3:18 AM   Base dressing in place, unable to visualize 7/22/2018  7:35 AM   Drainage Amount none 7/22/2018  3:18 AM   Dressing Care, Wound foam 7/22/2018  7:35 AM       Wound 07/19/18 0957 Right leg incision (Active)   Dressing Appearance dry;intact 7/22/2018  3:18 AM   Closure Liquid skin adhesive 7/22/2018  7:35 AM   Drainage Amount none 7/22/2018  3:18 AM   Dressing Care, Wound open to air 7/22/2018  7:35 AM             Physical Therapy Education     Title: PT OT SLP Therapies (Done)     Topic: Physical Therapy (Done)     Point: Mobility training (Done)    Learning Progress Summary     Learner Status Readiness Method Response Comment Documented by    Patient Done Acceptance E,TB YESSICA,JERRI   CW 07/22/18 0932     Done Acceptance E,TB VU,DU  CW 07/21/18 0915          Point: Home exercise program (Done)    Learning Progress Summary     Learner Status Readiness Method Response Comment Documented by    Patient Done Acceptance E,TB VU,DU  CW 07/22/18 0932     Done Acceptance E,TB VU,DU  CW 07/21/18 0915          Point: Body mechanics (Done)    Learning Progress Summary     Learner Status Readiness Method Response Comment Documented by    Patient Done Acceptance E,TB VU,DU  CW 07/22/18 0932     Done Acceptance E,TB VU,DU  CW 07/21/18 0915          Point: Precautions (Done)    Learning Progress Summary     Learner Status Readiness Method Response Comment Documented by    Patient Done Acceptance E,TB VU,DU  CW 07/22/18 0932     Done Acceptance E,TB VU,JERRI  CW 07/21/18 0915     Done Acceptance E VU  MD 07/20/18 0905                      User Key     Initials Effective Dates Name Provider Type Discipline    MD 04/03/18 -  Kelly Spears, PT Physical Therapist PT     03/07/18 -  Paulino Kelly, PTA Physical Therapy Assistant PT                    PT Recommendation and Plan  Anticipated Discharge Disposition (PT): home with home health  Therapy Frequency (PT Clinical Impression): daily  Outcome Summary/Treatment Plan (PT)  Anticipated Discharge Disposition (PT): home with home health  Plan of Care Reviewed With: patient  Progress: improving  Outcome Summary: Pt increasing with strength and balance with no AD and improved transfer safety          Outcome Measures     Row Name 07/22/18 0900 07/21/18 0900 07/20/18 0900       How much help from another person do you currently need...    Turning from your back to your side while in flat bed without using bedrails? 2  -CW 2  -CW 2  -MD    Moving from lying on back to sitting on the side of a flat bed without bedrails? 2  -CW 2  -CW 2  -MD    Moving to and from a bed to a chair (including a wheelchair)? 3  -CW 3  -CW 3  -MD    Standing up from a chair using your arms  (e.g., wheelchair, bedside chair)? 3  -CW 3  -CW 3  -MD    Climbing 3-5 steps with a railing? 2  -CW 2  -CW 2  -MD    To walk in hospital room? 3  -CW 3  -CW 3  -MD    AM-PAC 6 Clicks Score 15  -CW 15  -CW 15  -MD       Functional Assessment    Outcome Measure Options AM-PAC 6 Clicks Basic Mobility (PT)  -CW AM-PAC 6 Clicks Basic Mobility (PT)  -CW AM-PAC 6 Clicks Basic Mobility (PT)  -MD      User Key  (r) = Recorded By, (t) = Taken By, (c) = Cosigned By    Initials Name Provider Type    MD Kelly Spears, PT Physical Therapist    CW Paulino Kelly PTA Physical Therapy Assistant           Time Calculation:         PT Charges     Row Name 07/22/18 0933             Time Calculation    Start Time 0907  -CW      Stop Time 0933  -CW      Time Calculation (min) 26 min  -CW      PT Received On 07/22/18  -CW      PT - Next Appointment 07/23/18  -CW        User Key  (r) = Recorded By, (t) = Taken By, (c) = Cosigned By    Initials Name Provider Type    ISRAEL Kelly PTA Physical Therapy Assistant        Therapy Suggested Charges     Code   Minutes Charges    None           Therapy Charges for Today     Code Description Service Date Service Provider Modifiers Qty    66375226979 HC PT THER PROC EA 15 MIN 7/21/2018 Paulino Kelly PTA GP 1    55589591459 HC PT THER PROC EA 15 MIN 7/22/2018 Paulino Kelly PTA GP 2          PT G-Codes  Outcome Measure Options: AM-PAC 6 Clicks Basic Mobility (PT)    Paulino Kelly PTA  7/22/2018

## 2018-07-22 NOTE — PLAN OF CARE
Problem: Patient Care Overview  Goal: Plan of Care Review  Outcome: Ongoing (interventions implemented as appropriate)      Problem: Fall Risk (Adult)  Goal: Absence of Fall  Outcome: Ongoing (interventions implemented as appropriate)      Problem: Skin Injury Risk (Adult)  Goal: Skin Health and Integrity  Outcome: Ongoing (interventions implemented as appropriate)      Problem: Cardiac Surgery (Adult)  Goal: Signs and Symptoms of Listed Potential Problems Will be Absent, Minimized or Managed (Cardiac Surgery)  Outcome: Ongoing (interventions implemented as appropriate)

## 2018-07-22 NOTE — PLAN OF CARE
Problem: Patient Care Overview  Goal: Plan of Care Review  Outcome: Ongoing (interventions implemented as appropriate)   07/22/18 4625   Coping/Psychosocial   Plan of Care Reviewed With patient   Plan of Care Review   Progress improving   OTHER   Outcome Summary Pt increasing with strength and balance with no AD and improved transfer safety

## 2018-07-22 NOTE — PROGRESS NOTES
" LOS: 3 days   Patient Care Team:  Charlotte Johnson MD as PCP - General  Charlotte Johnson MD as PCP - Family Medicine  Janeth Young MD as Consulting Physician (Endocrinology)  Jim Schafer MD as Consulting Physician (Rheumatology)  Hari Merritt MD as Consulting Physician (Pain Medicine)  Cesar Remy MD as Consulting Physician (Psychiatry)  Jesus James MD as Consulting Physician (Cardiology)    Chief Complaint: post op fu    Subjective:  Symptoms:  No shortness of breath or chest pain.    Diet:  Adequate intake.  No nausea or vomiting.    Activity level: Returning to normal.    Pain:  Pain is requiring pain medication and well controlled.          Vital Signs  Temp:  [98.3 °F (36.8 °C)-98.5 °F (36.9 °C)] 98.3 °F (36.8 °C)  Heart Rate:  [72-77] 75  Resp:  [18] 18  BP: (104-148)/(59-88) 104/59  Body mass index is 37.4 kg/m².    Intake/Output Summary (Last 24 hours) at 07/22/18 1131  Last data filed at 07/22/18 0658   Gross per 24 hour   Intake              920 ml   Output             1170 ml   Net             -250 ml     No intake/output data recorded.    Chest tube drainage last 8 hours:  220 (left)    1    07/19/18  1405 07/21/18  0500 07/22/18  0600   Weight: 97.3 kg (214 lb 8.1 oz) 100 kg (221 lb 3.2 oz) 98.9 kg (218 lb)         Objective:  General Appearance:  Comfortable.    Vital signs: (most recent): Blood pressure 104/59, pulse 75, temperature 98.3 °F (36.8 °C), temperature source Oral, resp. rate 18, height 162.6 cm (64.02\"), weight 98.9 kg (218 lb), last menstrual period 06/28/2018, SpO2 96 %, not currently breastfeeding.    Output: Producing urine and no stool output.    Lungs:  Normal effort and normal respiratory rate.  There are rales (bilateral lower lobes).  No decreased breath sounds, wheezes or rhonchi.    Heart: Normal rate.  Regular rhythm.  S1 normal and S2 normal.  No murmur, gallop or friction rub.   Abdomen: Abdomen is soft and non-distended.  "   Extremities: There is dependent edema (1+).    Pulses: Distal pulses are intact.    Neurological: Patient is alert and oriented to person, place and time.    Skin:  Warm and dry.  (Sternal dressing dry and intact)            Results Review:        WBC WBC   Date Value Ref Range Status   07/22/2018 10.13 4.50 - 10.70 10*3/mm3 Final   07/21/2018 12.71 (H) 4.50 - 10.70 10*3/mm3 Final   07/20/2018 11.21 (H) 4.50 - 10.70 10*3/mm3 Final   07/19/2018 14.62 (H) 4.50 - 10.70 10*3/mm3 Final   07/19/2018 10.70 4.50 - 10.70 10*3/mm3 Final      HGB Hemoglobin   Date Value Ref Range Status   07/22/2018 10.3 (L) 11.9 - 15.5 g/dL Final   07/21/2018 11.0 (L) 11.9 - 15.5 g/dL Final   07/20/2018 10.9 (L) 11.9 - 15.5 g/dL Final   07/19/2018 11.5 (L) 11.9 - 15.5 g/dL Final   07/19/2018 11.4 (L) 11.9 - 15.5 g/dL Final      HCT Hematocrit   Date Value Ref Range Status   07/22/2018 32.4 (L) 35.6 - 45.5 % Final   07/21/2018 34.2 (L) 35.6 - 45.5 % Final   07/20/2018 34.3 (L) 35.6 - 45.5 % Final   07/19/2018 36.2 35.6 - 45.5 % Final   07/19/2018 35.4 (L) 35.6 - 45.5 % Final      Platelets Platelets   Date Value Ref Range Status   07/22/2018 97 (L) 140 - 500 10*3/mm3 Final   07/21/2018 101 (L) 140 - 500 10*3/mm3 Final   07/20/2018 84 (L) 140 - 500 10*3/mm3 Final   07/19/2018 101 (L) 140 - 500 10*3/mm3 Final   07/19/2018 115 (L) 140 - 500 10*3/mm3 Final        PT/INR:    Protime   Date Value Ref Range Status   07/20/2018 16.5 (H) 11.7 - 14.2 Seconds Final   07/19/2018 17.9 (H) 11.7 - 14.2 Seconds Final   /  INR   Date Value Ref Range Status   07/20/2018 1.36 (H) 0.90 - 1.10 Final   07/19/2018 1.51 (H) 0.90 - 1.10 Final       Sodium Sodium   Date Value Ref Range Status   07/22/2018 136 136 - 145 mmol/L Final   07/21/2018 132 (L) 136 - 145 mmol/L Final   07/20/2018 142 136 - 145 mmol/L Final   07/19/2018 141 136 - 145 mmol/L Final   07/19/2018 139 136 - 145 mmol/L Final      Potassium Potassium   Date Value Ref Range Status   07/22/2018 4.0 3.5  - 5.2 mmol/L Final   07/21/2018 4.3 3.5 - 5.2 mmol/L Final   07/20/2018 3.7 3.5 - 5.2 mmol/L Final   07/19/2018 3.7 3.5 - 5.2 mmol/L Final   07/19/2018 3.8 3.5 - 5.2 mmol/L Final   07/19/2018 3.6 3.5 - 5.2 mmol/L Final      Chloride Chloride   Date Value Ref Range Status   07/22/2018 102 98 - 107 mmol/L Final   07/21/2018 99 98 - 107 mmol/L Final   07/20/2018 108 (H) 98 - 107 mmol/L Final   07/19/2018 106 98 - 107 mmol/L Final   07/19/2018 104 98 - 107 mmol/L Final      Bicarbonate CO2   Date Value Ref Range Status   07/22/2018 23.5 22.0 - 29.0 mmol/L Final   07/21/2018 21.0 (L) 22.0 - 29.0 mmol/L Final   07/20/2018 22.0 22.0 - 29.0 mmol/L Final   07/19/2018 23.2 22.0 - 29.0 mmol/L Final   07/19/2018 21.2 (L) 22.0 - 29.0 mmol/L Final      BUN BUN   Date Value Ref Range Status   07/22/2018 23 (H) 6 - 20 mg/dL Final   07/21/2018 20 6 - 20 mg/dL Final   07/20/2018 16 6 - 20 mg/dL Final   07/19/2018 13 6 - 20 mg/dL Final   07/19/2018 14 6 - 20 mg/dL Final      Creatinine Creatinine   Date Value Ref Range Status   07/22/2018 1.01 (H) 0.57 - 1.00 mg/dL Final   07/21/2018 1.17 (H) 0.57 - 1.00 mg/dL Final   07/20/2018 1.07 (H) 0.57 - 1.00 mg/dL Final   07/19/2018 1.26 (H) 0.57 - 1.00 mg/dL Final   07/19/2018 1.24 (H) 0.57 - 1.00 mg/dL Final      Calcium Calcium   Date Value Ref Range Status   07/22/2018 8.8 8.6 - 10.5 mg/dL Final   07/21/2018 8.6 8.6 - 10.5 mg/dL Final   07/20/2018 8.4 (L) 8.6 - 10.5 mg/dL Final   07/19/2018 8.4 (L) 8.6 - 10.5 mg/dL Final   07/19/2018 8.9 8.6 - 10.5 mg/dL Final      Magnesium Magnesium   Date Value Ref Range Status   07/20/2018 2.6 1.6 - 2.6 mg/dL Final   07/19/2018 2.4 1.6 - 2.6 mg/dL Final   07/19/2018 2.6 1.6 - 2.6 mg/dL Final            aspirin 81 mg Oral Daily   budesonide-formoterol 2 puff Inhalation BID - RT   buPROPion  mg Oral Daily   chlorhexidine 15 mL Mouth/Throat Q12H   enoxaparin 40 mg Subcutaneous Daily   guaiFENesin 1,200 mg Oral Q12H   insulin aspart 2-5 Units  Subcutaneous 4x Daily AC & at Bedtime   insulin aspart 8 Units Subcutaneous TID With Meals   insulin detemir 25 Units Subcutaneous Q12H   levothyroxine 200 mcg Oral Daily   metoprolol tartrate 12.5 mg Oral Q12H   montelukast 10 mg Oral Nightly   mupirocin  Each Nare BID   pantoprazole 40 mg Oral Daily   potassium chloride 20 mEq Oral Daily   QUEtiapine 200 mg Oral Nightly   sennosides-docusate sodium 2 tablet Oral Nightly   topiramate 300 mg Oral Nightly   Vortioxetine HBr 5 mg Oral Daily With Breakfast              Patient Active Problem List   Diagnosis Code   • Severe mitral regurgitation I34.0   • Coronary artery disease involving native coronary artery of native heart with angina pectoris (CMS/[facility]) I25.119   • Mitral valve insufficiency I34.0   • Coronary artery disease involving native heart without angina pectoris I25.10       Assessment & Plan    -Rheumatic mitral regurgitation, CAD s/p CABG x4 with LIMA and MV repair/ring----POD #3 (Pagni)  -preop LV dysfxn, EF 45%---no ACE with borderline blood pressure  -DM II----insulin pump, endocrinology assisting  -Rheumatoid arthritis----last immunosuppressive injection 6/20/18  -Chronic neck and back pain, fibromyalgia----on neurontin and norco every 6 hours routinely, last epidural injection 6/26/18  -HTN---controlled on current medication  -CKD----stable  -Hypothyroidism---on home med  -Asthma---on routine inhalers  -Hx tobacco abuse, 30 pack year history----Quit 3/2018  -Bipolar disorder----well managed on current regimen  -Post op anemia/TCP----expected blood loss, will monitor  -Leukocytosis----resolved  -Statin allergy/intolerance----myalgias    Still with moderate drainage from left chest tube, 6mm ptx, keep today, repeat Lasix 40 mg IV.  D/c wires.  Continue to increase pulmonary toilet and mobility, plan for discharge next 1-2 days.     KALYANI Sims  07/22/18  11:31 AM

## 2018-07-23 ENCOUNTER — APPOINTMENT (OUTPATIENT)
Dept: GENERAL RADIOLOGY | Facility: HOSPITAL | Age: 53
End: 2018-07-23

## 2018-07-23 ENCOUNTER — APPOINTMENT (OUTPATIENT)
Dept: CARDIOLOGY | Facility: HOSPITAL | Age: 53
End: 2018-07-23
Attending: INTERNAL MEDICINE

## 2018-07-23 LAB
ABO + RH BLD: NORMAL
ABO + RH BLD: NORMAL
ANION GAP SERPL CALCULATED.3IONS-SCNC: 10.8 MMOL/L
BASE EXCESS BLDA CALC-SCNC: -1 MMOL/L (ref -5–5)
BASE EXCESS BLDA CALC-SCNC: -1 MMOL/L (ref -5–5)
BASE EXCESS BLDA CALC-SCNC: -2 MMOL/L (ref -5–5)
BASE EXCESS BLDA CALC-SCNC: -8 MMOL/L (ref -5–5)
BASE EXCESS BLDA CALC-SCNC: 1 MMOL/L (ref -5–5)
BASE EXCESS BLDA CALC-SCNC: 2 MMOL/L (ref -5–5)
BH BB BLOOD EXPIRATION DATE: NORMAL
BH BB BLOOD EXPIRATION DATE: NORMAL
BH BB BLOOD TYPE BARCODE: 6200
BH BB BLOOD TYPE BARCODE: 6200
BH BB DISPENSE STATUS: NORMAL
BH BB DISPENSE STATUS: NORMAL
BH BB PRODUCT CODE: NORMAL
BH BB PRODUCT CODE: NORMAL
BH BB UNIT NUMBER: NORMAL
BH BB UNIT NUMBER: NORMAL
BH CV ECHO MEAS - ACS: 2 CM
BH CV ECHO MEAS - AO MAX PG (FULL): 5.6 MMHG
BH CV ECHO MEAS - AO MAX PG: 11.2 MMHG
BH CV ECHO MEAS - AO MEAN PG (FULL): 2 MMHG
BH CV ECHO MEAS - AO MEAN PG: 5 MMHG
BH CV ECHO MEAS - AO ROOT AREA (BSA CORRECTED): 1.4
BH CV ECHO MEAS - AO ROOT AREA: 6.6 CM^2
BH CV ECHO MEAS - AO ROOT DIAM: 2.9 CM
BH CV ECHO MEAS - AO V2 MAX: 167 CM/SEC
BH CV ECHO MEAS - AO V2 MEAN: 96.8 CM/SEC
BH CV ECHO MEAS - AO V2 VTI: 32.5 CM
BH CV ECHO MEAS - AVA(I,A): 2.1 CM^2
BH CV ECHO MEAS - AVA(I,D): 2.1 CM^2
BH CV ECHO MEAS - AVA(V,A): 2.2 CM^2
BH CV ECHO MEAS - AVA(V,D): 2.2 CM^2
BH CV ECHO MEAS - BSA(HAYCOCK): 2.1 M^2
BH CV ECHO MEAS - BSA: 2 M^2
BH CV ECHO MEAS - BZI_BMI: 37.1 KILOGRAMS/M^2
BH CV ECHO MEAS - BZI_METRIC_HEIGHT: 162.6 CM
BH CV ECHO MEAS - BZI_METRIC_WEIGHT: 98 KG
BH CV ECHO MEAS - CONTRAST EF (2CH): 47 ML/M^2
BH CV ECHO MEAS - CONTRAST EF 4CH: 39.8 ML/M^2
BH CV ECHO MEAS - EDV(CUBED): 175.6 ML
BH CV ECHO MEAS - EDV(MOD-SP2): 164 ML
BH CV ECHO MEAS - EDV(MOD-SP4): 171 ML
BH CV ECHO MEAS - EDV(TEICH): 153.7 ML
BH CV ECHO MEAS - EF(CUBED): 54.7 %
BH CV ECHO MEAS - EF(MOD-BP): 43 %
BH CV ECHO MEAS - EF(MOD-SP2): 47 %
BH CV ECHO MEAS - EF(MOD-SP4): 39.8 %
BH CV ECHO MEAS - EF(TEICH): 45.9 %
BH CV ECHO MEAS - ESV(CUBED): 79.5 ML
BH CV ECHO MEAS - ESV(MOD-SP2): 87 ML
BH CV ECHO MEAS - ESV(MOD-SP4): 103 ML
BH CV ECHO MEAS - ESV(TEICH): 83.1 ML
BH CV ECHO MEAS - FS: 23.2 %
BH CV ECHO MEAS - IVS/LVPW: 1
BH CV ECHO MEAS - IVSD: 1.2 CM
BH CV ECHO MEAS - LAT PEAK E' VEL: 4 CM/SEC
BH CV ECHO MEAS - LV DIASTOLIC VOL/BSA (35-75): 84.6 ML/M^2
BH CV ECHO MEAS - LV MASS(C)D: 280.5 GRAMS
BH CV ECHO MEAS - LV MASS(C)DI: 138.8 GRAMS/M^2
BH CV ECHO MEAS - LV MAX PG: 5.6 MMHG
BH CV ECHO MEAS - LV MEAN PG: 3 MMHG
BH CV ECHO MEAS - LV SYSTOLIC VOL/BSA (12-30): 51 ML/M^2
BH CV ECHO MEAS - LV V1 MAX: 118 CM/SEC
BH CV ECHO MEAS - LV V1 MEAN: 72.3 CM/SEC
BH CV ECHO MEAS - LV V1 VTI: 21.5 CM
BH CV ECHO MEAS - LVIDD: 5.6 CM
BH CV ECHO MEAS - LVIDS: 4.3 CM
BH CV ECHO MEAS - LVLD AP2: 8.5 CM
BH CV ECHO MEAS - LVLD AP4: 8.7 CM
BH CV ECHO MEAS - LVLS AP2: 8.2 CM
BH CV ECHO MEAS - LVLS AP4: 8.2 CM
BH CV ECHO MEAS - LVOT AREA (M): 3.1 CM^2
BH CV ECHO MEAS - LVOT AREA: 3.1 CM^2
BH CV ECHO MEAS - LVOT DIAM: 2 CM
BH CV ECHO MEAS - LVPWD: 1.2 CM
BH CV ECHO MEAS - MED PEAK E' VEL: 4 CM/SEC
BH CV ECHO MEAS - MV A DUR: 0.14 SEC
BH CV ECHO MEAS - MV A MAX VEL: 162 CM/SEC
BH CV ECHO MEAS - MV DEC SLOPE: 748 CM/SEC^2
BH CV ECHO MEAS - MV DEC TIME: 0.32 SEC
BH CV ECHO MEAS - MV E MAX VEL: 180 CM/SEC
BH CV ECHO MEAS - MV E/A: 1.1
BH CV ECHO MEAS - MV MAX PG: 16.6 MMHG
BH CV ECHO MEAS - MV MEAN PG: 8 MMHG
BH CV ECHO MEAS - MV P1/2T MAX VEL: 201 CM/SEC
BH CV ECHO MEAS - MV P1/2T: 78.7 MSEC
BH CV ECHO MEAS - MV V2 MAX: 204 CM/SEC
BH CV ECHO MEAS - MV V2 MEAN: 138 CM/SEC
BH CV ECHO MEAS - MV V2 VTI: 63.9 CM
BH CV ECHO MEAS - MVA P1/2T LCG: 1.1 CM^2
BH CV ECHO MEAS - MVA(P1/2T): 2.8 CM^2
BH CV ECHO MEAS - MVA(VTI): 1.1 CM^2
BH CV ECHO MEAS - PA ACC TIME: 0.13 SEC
BH CV ECHO MEAS - PA MAX PG (FULL): 0.06 MMHG
BH CV ECHO MEAS - PA MAX PG: 3.7 MMHG
BH CV ECHO MEAS - PA PR(ACCEL): 18.7 MMHG
BH CV ECHO MEAS - PA V2 MAX: 96.6 CM/SEC
BH CV ECHO MEAS - PULM A REVS DUR: 0.14 SEC
BH CV ECHO MEAS - PULM A REVS VEL: 21.1 CM/SEC
BH CV ECHO MEAS - PULM DIAS VEL: 33.2 CM/SEC
BH CV ECHO MEAS - PULM S/D: 0.99
BH CV ECHO MEAS - PULM SYS VEL: 32.8 CM/SEC
BH CV ECHO MEAS - PVA(V,A): 3.1 CM^2
BH CV ECHO MEAS - PVA(V,D): 3.1 CM^2
BH CV ECHO MEAS - QP/QS: 1.1
BH CV ECHO MEAS - RAP SYSTOLE: 8 MMHG
BH CV ECHO MEAS - RV MAX PG: 3.7 MMHG
BH CV ECHO MEAS - RV MEAN PG: 2 MMHG
BH CV ECHO MEAS - RV V1 MAX: 95.8 CM/SEC
BH CV ECHO MEAS - RV V1 MEAN: 62.2 CM/SEC
BH CV ECHO MEAS - RV V1 VTI: 24.6 CM
BH CV ECHO MEAS - RVOT AREA: 3.1 CM^2
BH CV ECHO MEAS - RVOT DIAM: 2 CM
BH CV ECHO MEAS - RVSP: 40 MMHG
BH CV ECHO MEAS - SI(AO): 106.2 ML/M^2
BH CV ECHO MEAS - SI(CUBED): 47.6 ML/M^2
BH CV ECHO MEAS - SI(LVOT): 33.4 ML/M^2
BH CV ECHO MEAS - SI(MOD-SP2): 38.1 ML/M^2
BH CV ECHO MEAS - SI(MOD-SP4): 33.6 ML/M^2
BH CV ECHO MEAS - SI(TEICH): 34.9 ML/M^2
BH CV ECHO MEAS - SV(AO): 214.7 ML
BH CV ECHO MEAS - SV(CUBED): 96.1 ML
BH CV ECHO MEAS - SV(LVOT): 67.5 ML
BH CV ECHO MEAS - SV(MOD-SP2): 77 ML
BH CV ECHO MEAS - SV(MOD-SP4): 68 ML
BH CV ECHO MEAS - SV(RVOT): 77.3 ML
BH CV ECHO MEAS - SV(TEICH): 70.6 ML
BH CV ECHO MEAS - TAPSE (>1.6): 0.8 CM2
BH CV ECHO MEAS - TR MAX VEL: 283 CM/SEC
BH CV ECHO MEASUREMENTS AVERAGE E/E' RATIO: 45
BH CV VAS BP RIGHT ARM: NORMAL MMHG
BH CV XLRA - RV BASE: 4 CM
BH CV XLRA - TDI S': 6 CM/SEC
BUN BLD-MCNC: 21 MG/DL (ref 6–20)
BUN/CREAT SERPL: 23.1 (ref 7–25)
CALCIUM SPEC-SCNC: 9.1 MG/DL (ref 8.6–10.5)
CHLORIDE SERPL-SCNC: 103 MMOL/L (ref 98–107)
CO2 BLDA-SCNC: 19 MMOL/L (ref 24–29)
CO2 BLDA-SCNC: 27 MMOL/L (ref 24–29)
CO2 BLDA-SCNC: 29 MMOL/L (ref 24–29)
CO2 BLDA-SCNC: 30 MMOL/L (ref 24–29)
CO2 SERPL-SCNC: 25.2 MMOL/L (ref 22–29)
CREAT BLD-MCNC: 0.91 MG/DL (ref 0.57–1)
DEPRECATED RDW RBC AUTO: 46.9 FL (ref 37–54)
ERYTHROCYTE [DISTWIDTH] IN BLOOD BY AUTOMATED COUNT: 13.5 % (ref 11.7–13)
GFR SERPL CREATININE-BSD FRML MDRD: 65 ML/MIN/1.73
GLUCOSE BLD-MCNC: 107 MG/DL (ref 65–99)
GLUCOSE BLDC GLUCOMTR-MCNC: 119 MG/DL (ref 70–130)
GLUCOSE BLDC GLUCOMTR-MCNC: 124 MG/DL (ref 70–130)
GLUCOSE BLDC GLUCOMTR-MCNC: 132 MG/DL (ref 70–130)
GLUCOSE BLDC GLUCOMTR-MCNC: 138 MG/DL (ref 70–130)
GLUCOSE BLDC GLUCOMTR-MCNC: 165 MG/DL (ref 70–130)
GLUCOSE BLDC GLUCOMTR-MCNC: 205 MG/DL (ref 70–130)
GLUCOSE BLDC GLUCOMTR-MCNC: 213 MG/DL (ref 70–130)
GLUCOSE BLDC GLUCOMTR-MCNC: 218 MG/DL (ref 70–130)
GLUCOSE BLDC GLUCOMTR-MCNC: 222 MG/DL (ref 70–130)
GLUCOSE BLDC GLUCOMTR-MCNC: 223 MG/DL (ref 70–130)
HCO3 BLDA-SCNC: 18 MMOL/L (ref 22–26)
HCO3 BLDA-SCNC: 25.4 MMOL/L (ref 22–26)
HCO3 BLDA-SCNC: 25.7 MMOL/L (ref 22–26)
HCO3 BLDA-SCNC: 25.9 MMOL/L (ref 22–26)
HCO3 BLDA-SCNC: 27.4 MMOL/L (ref 22–26)
HCO3 BLDA-SCNC: 28.3 MMOL/L (ref 22–26)
HCT VFR BLD AUTO: 34.3 % (ref 35.6–45.5)
HCT VFR BLDA CALC: 21 % (ref 38–51)
HCT VFR BLDA CALC: 27 % (ref 38–51)
HCT VFR BLDA CALC: 28 % (ref 38–51)
HCT VFR BLDA CALC: 29 % (ref 38–51)
HCT VFR BLDA CALC: 30 % (ref 38–51)
HCT VFR BLDA CALC: 37 % (ref 38–51)
HGB BLD-MCNC: 10.6 G/DL (ref 11.9–15.5)
HGB BLDA-MCNC: 10.2 G/DL (ref 12–17)
HGB BLDA-MCNC: 12.6 G/DL (ref 12–17)
HGB BLDA-MCNC: 7.1 G/DL (ref 12–17)
HGB BLDA-MCNC: 9.2 G/DL (ref 12–17)
HGB BLDA-MCNC: 9.5 G/DL (ref 12–17)
HGB BLDA-MCNC: 9.9 G/DL (ref 12–17)
LEFT ATRIUM VOLUME INDEX: 35 ML/M2
LV EF 2D ECHO EST: 45 %
MAXIMAL PREDICTED HEART RATE: 169 BPM
MCH RBC QN AUTO: 29.4 PG (ref 26.9–32)
MCHC RBC AUTO-ENTMCNC: 30.9 G/DL (ref 32.4–36.3)
MCV RBC AUTO: 95.3 FL (ref 80.5–98.2)
PCO2 BLDA: 36.5 MM HG (ref 35–45)
PCO2 BLDA: 53.3 MM HG (ref 35–45)
PCO2 BLDA: 54.3 MM HG (ref 35–45)
PCO2 BLDA: 56.4 MM HG (ref 35–45)
PCO2 BLDA: 56.8 MM HG (ref 35–45)
PCO2 BLDA: 58 MM HG (ref 35–45)
PH BLDA: 7.26 PH UNITS (ref 7.35–7.6)
PH BLDA: 7.26 PH UNITS (ref 7.35–7.6)
PH BLDA: 7.29 PH UNITS (ref 7.35–7.6)
PH BLDA: 7.3 PH UNITS (ref 7.35–7.6)
PH BLDA: 7.3 PH UNITS (ref 7.35–7.6)
PH BLDA: 7.31 PH UNITS (ref 7.35–7.6)
PLATELET # BLD AUTO: 132 10*3/MM3 (ref 140–500)
PMV BLD AUTO: 11.7 FL (ref 6–12)
PO2 BLDA: 44 MMHG (ref 80–105)
PO2 BLDA: 451 MMHG (ref 80–105)
PO2 BLDA: 481 MMHG (ref 80–105)
PO2 BLDA: 487 MMHG (ref 80–105)
PO2 BLDA: 543 MMHG (ref 80–105)
PO2 BLDA: 59 MMHG (ref 80–105)
POTASSIUM BLD-SCNC: 3.8 MMOL/L (ref 3.5–5.2)
POTASSIUM BLDA-SCNC: 2.7 MMOL/L (ref 3.5–4.9)
POTASSIUM BLDA-SCNC: 3.1 MMOL/L (ref 3.5–4.9)
POTASSIUM BLDA-SCNC: 3.4 MMOL/L (ref 3.5–4.9)
POTASSIUM BLDA-SCNC: 3.6 MMOL/L (ref 3.5–4.9)
POTASSIUM BLDA-SCNC: 3.7 MMOL/L (ref 3.5–4.9)
POTASSIUM BLDA-SCNC: 3.8 MMOL/L (ref 3.5–4.9)
RBC # BLD AUTO: 3.6 10*6/MM3 (ref 3.9–5.2)
SAO2 % BLDA: 100 % (ref 95–98)
SAO2 % BLDA: 71 % (ref 95–98)
SAO2 % BLDA: 85 % (ref 95–98)
SODIUM BLD-SCNC: 139 MMOL/L (ref 136–145)
STRESS TARGET HR: 144 BPM
UNIT  ABO: NORMAL
UNIT  ABO: NORMAL
UNIT  RH: NORMAL
UNIT  RH: NORMAL
WBC NRBC COR # BLD: 7.34 10*3/MM3 (ref 4.5–10.7)

## 2018-07-23 PROCEDURE — 82962 GLUCOSE BLOOD TEST: CPT

## 2018-07-23 PROCEDURE — 99024 POSTOP FOLLOW-UP VISIT: CPT | Performed by: THORACIC SURGERY (CARDIOTHORACIC VASCULAR SURGERY)

## 2018-07-23 PROCEDURE — 80048 BASIC METABOLIC PNL TOTAL CA: CPT | Performed by: THORACIC SURGERY (CARDIOTHORACIC VASCULAR SURGERY)

## 2018-07-23 PROCEDURE — 94799 UNLISTED PULMONARY SVC/PX: CPT

## 2018-07-23 PROCEDURE — 99233 SBSQ HOSP IP/OBS HIGH 50: CPT | Performed by: INTERNAL MEDICINE

## 2018-07-23 PROCEDURE — 71045 X-RAY EXAM CHEST 1 VIEW: CPT

## 2018-07-23 PROCEDURE — 25010000002 PERFLUTREN (DEFINITY) 8.476 MG IN SODIUM CHLORIDE 0.9 % 10 ML INJECTION: Performed by: THORACIC SURGERY (CARDIOTHORACIC VASCULAR SURGERY)

## 2018-07-23 PROCEDURE — 63710000001 INSULIN DETEMIR PER 5 UNITS: Performed by: INTERNAL MEDICINE

## 2018-07-23 PROCEDURE — 85027 COMPLETE CBC AUTOMATED: CPT | Performed by: THORACIC SURGERY (CARDIOTHORACIC VASCULAR SURGERY)

## 2018-07-23 PROCEDURE — 99232 SBSQ HOSP IP/OBS MODERATE 35: CPT | Performed by: INTERNAL MEDICINE

## 2018-07-23 PROCEDURE — 93306 TTE W/DOPPLER COMPLETE: CPT | Performed by: INTERNAL MEDICINE

## 2018-07-23 PROCEDURE — 25010000002 ENOXAPARIN PER 10 MG: Performed by: THORACIC SURGERY (CARDIOTHORACIC VASCULAR SURGERY)

## 2018-07-23 PROCEDURE — 93306 TTE W/DOPPLER COMPLETE: CPT

## 2018-07-23 PROCEDURE — 97110 THERAPEUTIC EXERCISES: CPT

## 2018-07-23 RX ORDER — CARVEDILOL 6.25 MG/1
6.25 TABLET ORAL EVERY 12 HOURS SCHEDULED
Status: DISCONTINUED | OUTPATIENT
Start: 2018-07-23 | End: 2018-07-24 | Stop reason: HOSPADM

## 2018-07-23 RX ORDER — FUROSEMIDE 40 MG/1
40 TABLET ORAL DAILY
Status: DISCONTINUED | OUTPATIENT
Start: 2018-07-23 | End: 2018-07-24 | Stop reason: HOSPADM

## 2018-07-23 RX ORDER — POTASSIUM CHLORIDE 750 MG/1
20 CAPSULE, EXTENDED RELEASE ORAL DAILY
Status: DISCONTINUED | OUTPATIENT
Start: 2018-07-23 | End: 2018-07-24 | Stop reason: HOSPADM

## 2018-07-23 RX ADMIN — OXYCODONE HYDROCHLORIDE 10 MG: 5 TABLET ORAL at 08:36

## 2018-07-23 RX ADMIN — ACETAMINOPHEN 650 MG: 325 TABLET, FILM COATED ORAL at 12:50

## 2018-07-23 RX ADMIN — PANTOPRAZOLE SODIUM 40 MG: 40 TABLET, DELAYED RELEASE ORAL at 08:23

## 2018-07-23 RX ADMIN — GUAIFENESIN 1200 MG: 600 TABLET, EXTENDED RELEASE ORAL at 08:23

## 2018-07-23 RX ADMIN — POTASSIUM CHLORIDE 20 MEQ: 750 CAPSULE, EXTENDED RELEASE ORAL at 12:21

## 2018-07-23 RX ADMIN — PERFLUTREN 3 ML: 6.52 INJECTION, SUSPENSION INTRAVENOUS at 16:00

## 2018-07-23 RX ADMIN — OXYCODONE HYDROCHLORIDE 5 MG: 5 TABLET ORAL at 17:19

## 2018-07-23 RX ADMIN — TOPIRAMATE 300 MG: 100 TABLET, FILM COATED ORAL at 20:47

## 2018-07-23 RX ADMIN — LEVOTHYROXINE SODIUM 200 MCG: 100 TABLET ORAL at 08:23

## 2018-07-23 RX ADMIN — ENOXAPARIN SODIUM 40 MG: 40 INJECTION SUBCUTANEOUS at 17:20

## 2018-07-23 RX ADMIN — BUPROPION HYDROCHLORIDE 450 MG: 150 TABLET, EXTENDED RELEASE ORAL at 08:23

## 2018-07-23 RX ADMIN — METOPROLOL TARTRATE 12.5 MG: 25 TABLET ORAL at 08:22

## 2018-07-23 RX ADMIN — QUETIAPINE FUMARATE 200 MG: 200 TABLET, FILM COATED ORAL at 20:47

## 2018-07-23 RX ADMIN — MONTELUKAST 10 MG: 10 TABLET, FILM COATED ORAL at 20:47

## 2018-07-23 RX ADMIN — BUDESONIDE AND FORMOTEROL FUMARATE DIHYDRATE 2 PUFF: 160; 4.5 AEROSOL RESPIRATORY (INHALATION) at 20:15

## 2018-07-23 RX ADMIN — FUROSEMIDE 40 MG: 40 TABLET ORAL at 12:19

## 2018-07-23 RX ADMIN — MUPIROCIN 10 APPLICATION: 20 OINTMENT TOPICAL at 08:22

## 2018-07-23 RX ADMIN — GUAIFENESIN 1200 MG: 600 TABLET, EXTENDED RELEASE ORAL at 20:47

## 2018-07-23 RX ADMIN — OXYCODONE HYDROCHLORIDE 5 MG: 5 TABLET ORAL at 12:50

## 2018-07-23 RX ADMIN — DOCUSATE SODIUM -SENNOSIDES 2 TABLET: 50; 8.6 TABLET, COATED ORAL at 20:47

## 2018-07-23 RX ADMIN — BUDESONIDE AND FORMOTEROL FUMARATE DIHYDRATE 2 PUFF: 160; 4.5 AEROSOL RESPIRATORY (INHALATION) at 07:31

## 2018-07-23 RX ADMIN — POTASSIUM CHLORIDE 20 MEQ: 750 CAPSULE, EXTENDED RELEASE ORAL at 08:23

## 2018-07-23 RX ADMIN — CARVEDILOL 6.25 MG: 6.25 TABLET, FILM COATED ORAL at 20:47

## 2018-07-23 RX ADMIN — ACETAMINOPHEN 650 MG: 325 TABLET, FILM COATED ORAL at 17:19

## 2018-07-23 RX ADMIN — ASPIRIN 81 MG: 81 TABLET ORAL at 08:23

## 2018-07-23 RX ADMIN — ACETAMINOPHEN 650 MG: 325 TABLET, FILM COATED ORAL at 08:36

## 2018-07-23 RX ADMIN — INSULIN DETEMIR 25 UNITS: 100 INJECTION, SOLUTION SUBCUTANEOUS at 08:28

## 2018-07-23 RX ADMIN — INSULIN DETEMIR 25 UNITS: 100 INJECTION, SOLUTION SUBCUTANEOUS at 20:47

## 2018-07-23 RX ADMIN — CHLORHEXIDINE GLUCONATE 15 ML: 1.2 RINSE ORAL at 00:15

## 2018-07-23 RX ADMIN — OXYCODONE HYDROCHLORIDE 10 MG: 5 TABLET ORAL at 04:02

## 2018-07-23 NOTE — PROGRESS NOTES
53 y.o.   LOS: 4 days   Patient Care Team:  Charlotte Johnson MD as PCP - General  Charlotte Johnson MD as PCP - Family Medicine  Janeth Young MD as Consulting Physician (Endocrinology)  Jim Schafer MD as Consulting Physician (Rheumatology)  Hari Merritt MD as Consulting Physician (Pain Medicine)  Cesar Remy MD as Consulting Physician (Psychiatry)  Jesus James MD as Consulting Physician (Cardiology)    Chief Complaint:  Uncontrolled diabetes mellitus and postoperative management    No chief complaint on file.      Subjective     HPI  Patient reported that she does not have good appetite  She has not been eating well  She had been refusing the insulin sliding scale for fear of hypoglycemia  Patient's blood sugars are mostly below 200  Interval History:    Patient is a known diabetic for more than 16 years of which she had been on pump therapy for nearly 8 years  She's currently using Medtronic 670 G pump at home  And she does not use CGM  Pump is not at the bedside  Her average daily dosage is about 43 units  Patient does have symptoms suggestive of diabetic peripheral neuropathy  She denied any knowledge of diabetic retinopathy or nephropathy  Her recent hemoglobin A1c 6.7%.  She reported that it used to be 14% prior to pump therapy     Patient is status post coronary artery bypass grafting postoperative day 1  She just started Verbena  She is off the insulin drip since her blood sugars are in the 112 range  Review of Systems:      Review of Systems   Constitutional: Positive for fatigue.   Respiratory: Positive for chest tightness.    Cardiovascular: Positive for chest pain.   Gastrointestinal: Positive for abdominal distention. Negative for abdominal pain.   Neurological: Positive for dizziness.   All other systems reviewed and are negative.    Objective     Vital Signs   Temp:  [98.1 °F (36.7 °C)-98.6 °F (37 °C)] 98.6 °F (37 °C)  Heart Rate:  [69-76] 76  Resp:   [16-18] 16  BP: (102-133)/(59-80) 133/80    Physical Exam:  Physical Exam   Constitutional: She is oriented to person, place, and time.   Eyes: Pupils are equal, round, and reactive to light. EOM are normal.   Neck: Normal range of motion. Neck supple.   Cardiovascular: Normal rate, regular rhythm, normal heart sounds and intact distal pulses.    Pulmonary/Chest: Effort normal and breath sounds normal.   Abdominal: Soft. Bowel sounds are normal.   Musculoskeletal: Normal range of motion. She exhibits no edema.   Neurological: She is alert and oriented to person, place, and time.   Skin: Skin is warm and dry.   Psychiatric: She has a normal mood and affect. Her behavior is normal.   Nursing note and vitals reviewed.  Results Review:     I reviewed the patient's new clinical results.      Glucose   Date/Time Value Ref Range Status   07/23/2018 0308 107 (H) 65 - 99 mg/dL Final   07/22/2018 0331 136 (H) 65 - 99 mg/dL Final   07/21/2018 0415 208 (H) 65 - 99 mg/dL Final     Lab Results (last 72 hours)     Procedure Component Value Units Date/Time    POC Glucose Once [[id number]]  (Abnormal) Collected:  07/23/18 0521    Specimen:  Blood Updated:  07/23/18 0522     Glucose 132 (H) mg/dL     Narrative:       RN Notified R and V Meter: [id number] : [id number] Abdulkadir GOLDSMITH    Basic Metabolic Panel [[id number]]  (Abnormal) Collected:  07/23/18 0308    Specimen:  Blood Updated:  07/23/18 0409     Glucose 107 (H) mg/dL      BUN 21 (H) mg/dL      Creatinine 0.91 mg/dL      Sodium 139 mmol/L      Potassium 3.8 mmol/L      Chloride 103 mmol/L      CO2 25.2 mmol/L      Calcium 9.1 mg/dL      eGFR Non African Amer 65 mL/min/1.73      BUN/Creatinine Ratio 23.1     Anion Gap 10.8 mmol/L     Narrative:       GFR Normal >60  Chronic Kidney Disease <60  Kidney Failure <15    CBC (No Diff) [[id number]]  (Abnormal) Collected:  07/23/18 0308    Specimen:  Blood Updated:  07/23/18 0345     WBC 7.34 10*3/mm3      RBC 3.60 (L) 10*6/mm3       Hemoglobin 10.6 (L) g/dL      Hematocrit 34.3 (L) %      MCV 95.3 fL      MCH 29.4 pg      MCHC 30.9 (L) g/dL      RDW 13.5 (H) %      RDW-SD 46.9 fl      MPV 11.7 fL      Platelets 132 (L) 10*3/mm3     POC Glucose Once [[id number]]  (Abnormal) Collected:  07/22/18 2032    Specimen:  Blood Updated:  07/22/18 2033     Glucose 172 (H) mg/dL     Narrative:       RN Notified R and V Meter: [id number] : [id number] Abdulkadir GOLDSMITH    POC Glucose Once [[id number]]  (Abnormal) Collected:  07/22/18 1622    Specimen:  Blood Updated:  07/22/18 1623     Glucose 155 (H) mg/dL     Narrative:       Meter: [id number] : [id number] Cris Ramirez MARIA A    POC Glucose Once [[id number]]  (Abnormal) Collected:  07/22/18 1045    Specimen:  Blood Updated:  07/22/18 1047     Glucose 186 (H) mg/dL     Narrative:       Meter: [id number] : [id number] Cris Blackn MARIA A    POC Glucose Once [[id number]]  (Abnormal) Collected:  07/22/18 0530    Specimen:  Blood Updated:  07/22/18 0531     Glucose 137 (H) mg/dL     Narrative:       Meter: [id number] : [id number] Geovanna CAMERON    Basic Metabolic Panel [[id number]]  (Abnormal) Collected:  07/22/18 0331    Specimen:  Blood Updated:  07/22/18 0410     Glucose 136 (H) mg/dL      BUN 23 (H) mg/dL      Creatinine 1.01 (H) mg/dL      Sodium 136 mmol/L      Potassium 4.0 mmol/L      Chloride 102 mmol/L      CO2 23.5 mmol/L      Calcium 8.8 mg/dL      eGFR Non African Amer 58 (L) mL/min/1.73      BUN/Creatinine Ratio 22.8     Anion Gap 10.5 mmol/L     Narrative:       GFR Normal >60  Chronic Kidney Disease <60  Kidney Failure <15    CBC (No Diff) [[id number]]  (Abnormal) Collected:  07/22/18 0331    Specimen:  Blood Updated:  07/22/18 0400     WBC 10.13 10*3/mm3      RBC 3.44 (L) 10*6/mm3      Hemoglobin 10.3 (L) g/dL      Hematocrit 32.4 (L) %      MCV 94.2 fL      MCH 29.9 pg      MCHC 31.8 (L) g/dL      RDW 13.7 (H) %      RDW-SD 46.9 fl      MPV 12.0 fL      Platelets 97 (L) 10*3/mm3     POC Glucose  Once [[id number]]  (Abnormal) Collected:  07/21/18 2020    Specimen:  Blood Updated:  07/21/18 2022     Glucose 203 (H) mg/dL     Narrative:       Meter: [id number] : [id number] Zacherydanuta GOLDSMITH    POC Glucose Once [[id number]]  (Abnormal) Collected:  07/21/18 1550    Specimen:  Blood Updated:  07/21/18 1552     Glucose 245 (H) mg/dL     Narrative:       Meter: [id number] : [id number] Nadia Robb MARIA A    POC Glucose Once [[id number]]  (Abnormal) Collected:  07/21/18 1110    Specimen:  Blood Updated:  07/21/18 1112     Glucose 211 (H) mg/dL     Narrative:       Meter: [id number] : [id number] Nadia Robb MARIA A    CBC (No Diff) [[id number]]  (Abnormal) Collected:  07/21/18 0415    Specimen:  Blood Updated:  07/21/18 0621     WBC 12.71 (H) 10*3/mm3      RBC 3.64 (L) 10*6/mm3      Hemoglobin 11.0 (L) g/dL      Hematocrit 34.2 (L) %      MCV 94.0 fL      MCH 30.2 pg      MCHC 32.2 (L) g/dL      RDW 14.2 (H) %      RDW-SD 48.5 fl      MPV 12.7 (H) fL      Platelets 101 (L) 10*3/mm3     POC Glucose Once [[id number]]  (Abnormal) Collected:  07/21/18 0534    Specimen:  Blood Updated:  07/21/18 0538     Glucose 225 (H) mg/dL     Narrative:       Meter: [id number] : [id number] Taylor Anaya RN    Basic Metabolic Panel [[id number]]  (Abnormal) Collected:  07/21/18 0415    Specimen:  Blood Updated:  07/21/18 0450     Glucose 208 (H) mg/dL      BUN 20 mg/dL      Creatinine 1.17 (H) mg/dL      Sodium 132 (L) mmol/L      Potassium 4.3 mmol/L      Chloride 99 mmol/L      CO2 21.0 (L) mmol/L      Calcium 8.6 mg/dL      eGFR Non African Amer 49 (L) mL/min/1.73      BUN/Creatinine Ratio 17.1     Anion Gap 12.0 mmol/L     Narrative:       GFR Normal >60  Chronic Kidney Disease <60  Kidney Failure <15    POC Glucose Once [[id number]]  (Abnormal) Collected:  07/20/18 1959    Specimen:  Blood Updated:  07/20/18 2000     Glucose 220 (H) mg/dL     Narrative:       Meter: [id number] : [id number] Wiley GARDNER  Glucose Once [[id number]]  (Abnormal) Collected:  07/20/18 1537    Specimen:  Blood Updated:  07/20/18 1538     Glucose 244 (H) mg/dL     Narrative:       Meter: [id number] : [id number] Eric Marie NA    POC Glucose Once [[id number]]  (Abnormal) Collected:  07/20/18 1034    Specimen:  Blood Updated:  07/20/18 1038     Glucose 197 (H) mg/dL     Narrative:       Meter: [id number] : [id number] Yrn Carvalho NA    POC Glucose Once [[id number]]  (Abnormal) Collected:  07/20/18 0815    Specimen:  Blood Updated:  07/20/18 0819     Glucose 187 (H) mg/dL     Narrative:       Meter: [id number] : [id number] José Luis Graves RN        Imaging Results (last 72 hours)     Procedure Component Value Units Date/Time    XR Chest 1 View [[id number]] Collected:  07/23/18 0535     Updated:  07/23/18 0540    Narrative:       PORTABLE CHEST X-RAY     CLINICAL HISTORY: post op open heart     COMPARISON: 7/22/2018.      FINDINGS: Portable AP view of the chest was obtained with overlying  monitor leads in place. Small left apical pneumothorax is again seen. It  is minimally increased in size. Left thoracotomy tube remains in place.  Bibasilar atelectasis and small effusions noted. Stable cardiomegaly.  Some improvement in mild vascular congestion. The patient is status post  CABG.                Impression:       1. Small left apical pneumothorax has increased very slightly, a left  thoracotomy tube remains in place.  2. Improving vascular congestion, basilar atelectasis and small  effusions persist     This report was finalized on 7/23/2018 5:37 AM by Shakeel Lin M.D.       XR Chest 1 View [[id number]] Collected:  07/21/18 0727     Updated:  07/22/18 2338    Narrative:       PORTABLE CHEST X-RAY     CLINICAL HISTORY: Post-Op Heart Surgery; I25.119-Atherosclerotic heart  disease of native coronary artery with unspecified angina pectoris;  I25.10-Atherosclerotic heart disease of native coronary artery without  angina pectoris;  I34.0-Nonrheumatic mitral (valve) insufficiency     COMPARISON: 07/20/2018.     FINDINGS: Portable AP view of the chest was obtained with overlying  monitor leads in place. Stone-Lasha catheter and introducer have been  removed. Other lines are unchanged. Vascular congestion persist albeit  improved slightly. Heart size normal considering technique. No  significant pleural fluid. No pneumothorax.             Impression:       1. No pneumothorax following Stone-Lasha catheter removal.  2. Slight improvement in vascular congestion.        This report was finalized on 7/22/2018 11:34 PM by Shakeel Lin M.D.       XR Chest PA & Lateral [[id number]] Collected:  07/22/18 0922     Updated:  07/22/18 0929    Narrative:       XR CHEST PA AND LATERAL-     HISTORY: Female who is 53 years-old,  postoperative evaluation, left  pneumothorax     TECHNIQUE: Frontal and lateral views of the chest     COMPARISON: 7/21/2018     FINDINGS: Stable appearing left chest tube. Sternotomy wires and cardiac  valve marker noted. Heart is enlarged. Pulmonary vasculature appears  slightly less congested. Persistent patchy densities at the mid to lower  lungs, mild left pleural effusion. Persistent small left pneumothorax,  slightly more conspicuous but not definitely changed considering  differences in technique and patient positioning. No right pneumothorax.  Otherwise stable.       Impression:       Persistent small left apical pneumothorax patchy densities  at the mid to lower lungs, left pleural effusion, continued follow-up  recommended.     This report was finalized on 7/22/2018 9:26 AM by Dr. Brian Guan M.D.       XR Chest 1 View [[id number]] Collected:  07/21/18 1336     Updated:  07/21/18 1413    Narrative:       AP Chest 7/21/2018     HISTORY: Chest tube removal.     The heart size is mildly enlarged. There are some patchy areas of  increased density in both lung bases consistent with atelectasis and/or  pneumonia.     There is a  small left apical pneumothorax with approximately 6 mm  pleural separation in the left apex. Left chest tube is seen in the left  lower hemithorax. Sternotomy wires and prosthetic valve are seen..       Impression:       Tiny left apical pneumothorax.     This report was finalized on 7/21/2018 2:10 PM by Dr. Devante Tam M.D.       XR Chest 1 View [[id number]] Collected:  07/20/18 0718     Updated:  07/21/18 0548    Narrative:       PORTABLE CHEST X-RAY     CLINICAL HISTORY: Post-Op Heart Surgery; I25.10-Atherosclerotic heart  disease of native coronary artery without angina pectoris;  I34.0-Nonrheumatic mitral (valve) insufficiency.     COMPARISON: 07/19/2018.     FINDINGS: Portable AP view of the chest was obtained with overlying  monitor leads in place. ET tube has been removed. The other lines are  unchanged. There is mild vascular congestion. No active airspace disease  or significant pleural fluid. Heart size normal considering technique.             Impression:       Interval extubation with development of mild vascular  congestion.        This report was finalized on 7/21/2018 5:44 AM by Shakeel Lin M.D.             Medication Review:       Current Facility-Administered Medications:   •  acetaminophen (TYLENOL) suppository 650 mg, 650 mg, Rectal, Q4H PRN, Kevin Tobias MD  •  acetaminophen (TYLENOL) tablet 650 mg, 650 mg, Oral, Q4H PRN, Kevin Tobias MD, 650 mg at 07/22/18 1740  •  ALPRAZolam (XANAX) tablet 0.25 mg, 0.25 mg, Oral, Q8H PRN, Kevin Tobias MD  •  aspirin EC tablet 81 mg, 81 mg, Oral, Daily, Kevin Tobias MD, 81 mg at 07/22/18 0946  •  bisacodyl (DULCOLAX) EC tablet 10 mg, 10 mg, Oral, Daily PRN, Kevin Tobias MD  •  bisacodyl (DULCOLAX) suppository 10 mg, 10 mg, Rectal, Daily PRN, Kevin Tobias MD  •  budesonide-formoterol (SYMBICORT) 160-4.5 MCG/ACT inhaler 2 puff, 2 puff, Inhalation, BID - RT, Vianey Arnold APRN, 2 puff at 07/23/18 0731  •  buPROPion XL (WELLBUTRIN  XL) 24 hr tablet 450 mg, 450 mg, Oral, Daily, Kevin Tobias MD, 450 mg at 07/22/18 0946  •  chlorhexidine (PERIDEX) 0.12 % solution 15 mL, 15 mL, Mouth/Throat, Q12H, Kevin Tobias MD, 15 mL at 07/23/18 0015  •  clonazePAM (KlonoPIN) tablet 1 mg, 1 mg, Oral, 4x Daily PRN, Kevin Tobias MD, 1 mg at 07/19/18 1643  •  cyclobenzaprine (FLEXERIL) tablet 10 mg, 10 mg, Oral, Q8H PRN, Kevin Tobais MD  •  enoxaparin (LOVENOX) syringe 40 mg, 40 mg, Subcutaneous, Daily, Kevin Tobias MD, 40 mg at 07/21/18 0909  •  gabapentin (NEURONTIN) capsule 600 mg, 600 mg, Oral, Q8H PRN, KALYANI Sims  •  guaiFENesin (MUCINEX) 12 hr tablet 1,200 mg, 1,200 mg, Oral, Q12H, Kevin Tobias MD, 1,200 mg at 07/22/18 2003  •  HYDROcodone-acetaminophen (NORCO) 5-325 MG per tablet 2 tablet, 2 tablet, Oral, Q4H PRN, Kevin Tobias MD, 2 tablet at 07/20/18 0531  •  insulin aspart (novoLOG) injection 2-5 Units, 2-5 Units, Subcutaneous, 4x Daily AC & at Bedtime, Delio NAIK MD, Stopped at 07/23/18 0730  •  insulin aspart (novoLOG) injection 8 Units, 8 Units, Subcutaneous, TID With Meals, Delio NAIK MD, 8 Units at 07/21/18 1704  •  insulin detemir (LEVEMIR) injection 25 Units, 25 Units, Subcutaneous, Q12H, Delio NAIK MD, 25 Units at 07/22/18 2048  •  levothyroxine (SYNTHROID, LEVOTHROID) tablet 200 mcg, 200 mcg, Oral, Daily, Kevin Tobias MD, 200 mcg at 07/22/18 0946  •  Magnesium Sulfate 2 gram infusion - Mg less than or equal to 1.5 mg/dL, 2 g, Intravenous, PRN **OR** Magesium Sulfate 1 gram infusion - Mg 1.6-1.9 mg/dL, 1 g, Intravenous, PRN, Kevin Tobias MD  •  magnesium hydroxide (MILK OF MAGNESIA) suspension 2400 mg/10mL 10 mL, 10 mL, Oral, Daily PRN, Kevin Tobisa MD  •  metoprolol tartrate (LOPRESSOR) tablet 12.5 mg, 12.5 mg, Oral, Q12H, Kevin Tobias MD, 12.5 mg at 07/22/18 2003  •  montelukast (SINGULAIR) tablet 10 mg, 10 mg, Oral, Nightly, Kevin Tobias MD, 10 mg at  07/22/18 2003  •  morphine injection 1 mg, 1 mg, Intravenous, Q4H PRN **AND** naloxone (NARCAN) injection 0.4 mg, 0.4 mg, Intravenous, Q5 Min PRN, Kevin Tobias MD  •  mupirocin (BACTROBAN) 2 % nasal ointment, , Each Nare, BID, Kevin Tobias MD, 10 application at 07/22/18 2003  •  ondansetron (ZOFRAN) injection 4 mg, 4 mg, Intravenous, Q6H PRN, Kevin Tobias MD  •  oxyCODONE (ROXICODONE) immediate release tablet 10 mg, 10 mg, Oral, Q4H PRN, Kevin Tobias MD, 10 mg at 07/23/18 0402  •  pantoprazole (PROTONIX) EC tablet 40 mg, 40 mg, Oral, Daily, Kevin Tobias MD, 40 mg at 07/22/18 0952  •  potassium chloride (MICRO-K) CR capsule 40 mEq, 40 mEq, Oral, PRN **OR** potassium chloride (KLOR-CON) packet 40 mEq, 40 mEq, Oral, PRN, Kevin Tobias MD  •  potassium chloride (MICRO-K) CR capsule 20 mEq, 20 mEq, Oral, Daily, KALYANI Sims, 20 mEq at 07/22/18 0946  •  potassium chloride 10 mEq in 100 mL IVPB, 10 mEq, Intravenous, Q1H PRN **OR** potassium chloride 10 mEq in 100 mL IVPB, 10 mEq, Intravenous, Q1H PRN, Kevin Tobias MD  •  potassium chloride 20 mEq in 50 mL IVPB, 20 mEq, Intravenous, Q1H PRN, Kevin Tobias MD  •  potassium chloride 20 mEq in 50 mL IVPB, 20 mEq, Intravenous, Q1H PRN, Kevin Tobias MD, Last Rate: 50 mL/hr at 07/19/18 1238, 20 mEq at 07/19/18 1238  •  potassium chloride 20 mEq in 50 mL IVPB, 20 mEq, Intravenous, Q1H PRN, Kevin Tobias MD  •  promethazine (PHENERGAN) tablet 12.5 mg, 12.5 mg, Oral, Q6H PRN **OR** promethazine (PHENERGAN) injection 12.5 mg, 12.5 mg, Intravenous, Q6H PRN, Kevin Tobias MD  •  QUEtiapine (SEROquel) tablet 200 mg, 200 mg, Oral, Nightly, Kevin Tobias MD, 200 mg at 07/22/18 2004  •  sennosides-docusate sodium (SENOKOT-S) 8.6-50 MG tablet 2 tablet, 2 tablet, Oral, Nightly, Kevin Tobias MD, 2 tablet at 07/22/18 2004  •  sodium chloride 0.9 % flush 30 mL, 30 mL, Intravenous, Once PRN, Kevin Tobias MD  •  topiramate (TOPAMAX) tablet  "300 mg, 300 mg, Oral, Nightly, Kevin Tobias MD, 300 mg at 07/22/18 2003  •  Vortioxetine HBr 5 MG 5 mg, 5 mg, Oral, Daily With Breakfast, Kevin Tobias MD, 5 mg at 07/22/18 0789    Assessment/Plan     Patient Active Problem List   Diagnosis   • Severe mitral regurgitation   • Coronary artery disease involving native coronary artery of native heart with angina pectoris (CMS/[facility])   • Mitral valve insufficiency   • Coronary artery disease involving native heart without angina pectoris     Uncontrolled type 2 diabetes mellitus  Uncontrolled type 2 diabetes mellitus with neuropathy  Insulin pump status  Hypoglycemia  Abnormal weight gain  Hyporthyroidism  Status post a CABG postoperative day 1     Insulin pump was disconnected and was taken home and the patient  Patient reported that since starting insulin.  She improved significantly  Her last hemoglobin A1c was in the 8% range     Patient is currently not eating much  She is allowed liquid diet but she's not having a much appetite  She is currently receiving the basal insulin and refusing the mealtime sliding scale  She is fearful of hypoglycemia  Most of her Accu-Cheks are less than 200  Will continue to monitor the Accu-Cheks and then adjust insulin as needed  I advised the patient to consider eating smaller portions and see if she will tolerate  Patient verbalized understanding    The total time spent for old record and lab review and floor time was more than 35 min of which greater than 20 min of time  ( greater than 50% of the total time )  was spent face to face with the patient counseling and coordination of care owas spent on counseling the patient on recommended evaluation and treatment options, instructions for management/treatment and /or follow up  and importance of compliance with chosen management or treatment options    Delio Watts MD FACE.  07/23/18  8:15 AM      EMR Dragon / transcription disclaimer:     \"Dictated utilizing Dragon " "dictation\".   "

## 2018-07-23 NOTE — PROGRESS NOTES
Discharge Planning Assessment  [facility]     Patient Name: Angeles Oquendo  MRN: [id number]  Today's Date: 7/23/2018    Admit Date: 7/19/2018          Discharge Needs Assessment     Row Name 07/23/18 1500       Living Environment    Lives With child(dalia), dependent;parent(s)    Name(s) of Who Lives With Patient Erica Gonzalez, mother and two teenage children    Current Living Arrangements home/apartment/condo    Primary Care Provided by self    Provides Primary Care For child(dalia);pet(s)   2 teenagers 15 & 16 & a dog    Family Caregiver if Needed parent(s)    Family Caregiver Names Erica Gonzalez    Quality of Family Relationships helpful;involved;supportive    Able to Return to Prior Arrangements yes       Resource/Environmental Concerns    Resource/Environmental Concerns home accessibility    Home Accessibility Concerns stairs to access bedroom or bathroom       Transition Planning    Patient/Family Anticipates Transition to home with family    Patient/Family Anticipated Services at Transition home health care    Transportation Anticipated family or friend will provide       Discharge Needs Assessment    Equipment Currently Used at Home cane, straight;bath bench;shower chair    Anticipated Changes Related to Illness none    Equipment Needed After Discharge none    Discharge Facility/Level of Care Needs home with home health    Offered/Gave Vendor List yes    Patient's Choice of Community Agency(s) [facility] [place]            Discharge Plan     Row Name 07/23/18 1506       Plan    Plan Home; [facility] [place] to follow.      Patient/Family in Agreement with Plan yes    Plan Comments Spoke with Pt and her mother Erica Gonzalez ([phone]) at bedside.  CCP introduced self and role.  Pt confirmed information on face sheet.  Pt stated she is IADL'S, disabled & drives.  Pt lives in a house with a basement three-entrance steps.  Pt lives with her two teenage children ages 15 & 16, her mother & her dog.  Pt denies past home health, subacute  rehab and DME.  Pt confirms pharmacy as [facility] in [place], [place] on [address]  Pt denies issues with affording her medications.  Pt plans to return home at discharge with 24-hour assistance of her mother and children.  Pt has chosen [facility] to follow her once d/c.  Referral called to karely Snow ext. [phone] at 3:02 PM.  CCP will continue to follow…ISHAN TOURE/CCP        Destination     No service coordination in this encounter.      Durable Medical Equipment     No service coordination in this encounter.      Dialysis/Infusion     No service coordination in this encounter.      Home Medical Care     Service Request Status Selected Specialties Address Phone Number Fax Number    [facility] Selected Home Health Services [address] [address] [phone]      Social Care     No service coordination in this encounter.                Demographic Summary     Row Name 07/23/18 1459       General Information    Admission Type inpatient    Arrived From home    Required Notices Provided Important Message from Medicare    Referral Source physician    Reason for Consult discharge planning    Preferred Language English     Used During This Interaction no       Contact Information    Permission Granted to Share Info With family/designee    Contact Information Obtained for             Functional Status     Row Name 07/23/18 1500       Functional Status    Usual Activity Tolerance good    Current Activity Tolerance moderate       Functional Status, IADL    Medications independent    Meal Preparation independent    Housekeeping independent    Laundry independent    Shopping independent       Mental Status    General Appearance WDL WDL       Mental Status Summary    Recent Changes in Mental Status/Cognitive Functioning no changes       Employment/    Employment Status disabled            Psychosocial    No  documentation.           Abuse/Neglect     Row Name 07/23/18 1500       Personal Safety    Feels Unsafe at Home or Work/School no    Feels Threatened by Someone no    Does Anyone Try to Keep You From Having Contact with Others or Doing Things Outside Your Home? no    Physical Signs of Abuse Present no            Legal    No documentation.           Substance Abuse    No documentation.           Patient Forms    No documentation.         Desiree Rosenberg RN

## 2018-07-23 NOTE — PROGRESS NOTES
LOS: 4 days   Patient Care Team:  Charlotte Johnson MD as PCP - General  Charlotte Johnson MD as PCP - Family Medicine  Janeth Young MD as Consulting Physician (Endocrinology)  Jim Schafer MD as Consulting Physician (Rheumatology)  Hari Merritt MD as Consulting Physician (Pain Medicine)  Cesar Remy MD as Consulting Physician (Psychiatry)  Jesus James MD as Consulting Physician (Cardiology)    Chief Complaint:   Post-op follow-up, s/p CABG/MVr    Subjective  States she is tired this AM. Unable to sleep due to noise coming from patient's room next hers.     Vital Signs  Temp:  [98.1 °F (36.7 °C)-98.6 °F (37 °C)] 98.6 °F (37 °C)  Heart Rate:  [69-79] 79  Resp:  [16-18] 16  BP: (102-133)/(59-80) 133/80  Body mass index is 37.2 kg/m².    Intake/Output Summary (Last 24 hours) at 07/23/18 0918  Last data filed at 07/23/18 0844   Gross per 24 hour   Intake              720 ml   Output             2550 ml   Net            -1830 ml     I/O this shift:  In: -   Out: 350 [Urine:350]    Chest tube drainage last 8 hours: 50, no air leak     1    07/21/18  0500 07/22/18  0600 07/23/18  0500   Weight: 100 kg (221 lb 3.2 oz) 98.9 kg (218 lb) 98.3 kg (216 lb 12.8 oz)         Objective    Physical Exam:   General Appearance: awake and alert, no acute distress   Lungs: respirations regular, respirations unlabored and diminished bases   Heart: regular rhythm & normal rate and normal S1, S2   Abdomen: soft or nontender, + bowel sounds    Skin: sternal incision clean, dry, intact; EVH site c/d/i   Neuro: alert and oriented, no focal deficits.     Results Review:        WBC WBC   Date Value Ref Range Status   07/23/2018 7.34 4.50 - 10.70 10*3/mm3 Final   07/22/2018 10.13 4.50 - 10.70 10*3/mm3 Final   07/21/2018 12.71 (H) 4.50 - 10.70 10*3/mm3 Final      HGB Hemoglobin   Date Value Ref Range Status   07/23/2018 10.6 (L) 11.9 - 15.5 g/dL Final   07/22/2018 10.3 (L) 11.9 - 15.5 g/dL Final    07/21/2018 11.0 (L) 11.9 - 15.5 g/dL Final      HCT Hematocrit   Date Value Ref Range Status   07/23/2018 34.3 (L) 35.6 - 45.5 % Final   07/22/2018 32.4 (L) 35.6 - 45.5 % Final   07/21/2018 34.2 (L) 35.6 - 45.5 % Final      Platelets Platelets   Date Value Ref Range Status   07/23/2018 132 (L) 140 - 500 10*3/mm3 Final   07/22/2018 97 (L) 140 - 500 10*3/mm3 Final   07/21/2018 101 (L) 140 - 500 10*3/mm3 Final        PT/INR:  No results found for: PROTIME/No results found for: INR    Sodium Sodium   Date Value Ref Range Status   07/23/2018 139 136 - 145 mmol/L Final   07/22/2018 136 136 - 145 mmol/L Final   07/21/2018 132 (L) 136 - 145 mmol/L Final      Potassium Potassium   Date Value Ref Range Status   07/23/2018 3.8 3.5 - 5.2 mmol/L Final   07/22/2018 4.0 3.5 - 5.2 mmol/L Final   07/21/2018 4.3 3.5 - 5.2 mmol/L Final      Chloride Chloride   Date Value Ref Range Status   07/23/2018 103 98 - 107 mmol/L Final   07/22/2018 102 98 - 107 mmol/L Final   07/21/2018 99 98 - 107 mmol/L Final      Bicarbonate CO2   Date Value Ref Range Status   07/23/2018 25.2 22.0 - 29.0 mmol/L Final   07/22/2018 23.5 22.0 - 29.0 mmol/L Final   07/21/2018 21.0 (L) 22.0 - 29.0 mmol/L Final      BUN BUN   Date Value Ref Range Status   07/23/2018 21 (H) 6 - 20 mg/dL Final   07/22/2018 23 (H) 6 - 20 mg/dL Final   07/21/2018 20 6 - 20 mg/dL Final      Creatinine Creatinine   Date Value Ref Range Status   07/23/2018 0.91 0.57 - 1.00 mg/dL Final   07/22/2018 1.01 (H) 0.57 - 1.00 mg/dL Final   07/21/2018 1.17 (H) 0.57 - 1.00 mg/dL Final      Calcium Calcium   Date Value Ref Range Status   07/23/2018 9.1 8.6 - 10.5 mg/dL Final   07/22/2018 8.8 8.6 - 10.5 mg/dL Final   07/21/2018 8.6 8.6 - 10.5 mg/dL Final      Magnesium No results found for: MG         aspirin 81 mg Oral Daily   budesonide-formoterol 2 puff Inhalation BID - RT   buPROPion  mg Oral Daily   chlorhexidine 15 mL Mouth/Throat Q12H   enoxaparin 40 mg Subcutaneous Daily    guaiFENesin 1,200 mg Oral Q12H   insulin aspart 2-5 Units Subcutaneous 4x Daily AC & at Bedtime   insulin aspart 8 Units Subcutaneous TID With Meals   insulin detemir 25 Units Subcutaneous Q12H   levothyroxine 200 mcg Oral Daily   metoprolol tartrate 12.5 mg Oral Q12H   montelukast 10 mg Oral Nightly   mupirocin  Each Nare BID   pantoprazole 40 mg Oral Daily   potassium chloride 20 mEq Oral Daily   QUEtiapine 200 mg Oral Nightly   sennosides-docusate sodium 2 tablet Oral Nightly   topiramate 300 mg Oral Nightly   Vortioxetine HBr 5 mg Oral Daily With Breakfast              Patient Active Problem List   Diagnosis Code   • Severe mitral regurgitation I34.0   • Coronary artery disease involving native coronary artery of native heart with angina pectoris (CMS/LTAC, located within [facility]) I25.119   • Mitral valve insufficiency I34.0   • Coronary artery disease involving native heart without angina pectoris I25.10       Assessment & Plan    -Rheumatic mitral regurgitation, CAD s/p CABG x4 with LIMA and MV repair/ring----POD #4 (Jatinder)  -preop LV dysfxn, EF 45%---no ACE while titrating beta blocker, lasix  -DM II----insulin pump, endocrinology assisting  -Rheumatoid arthritis----last immunosuppressive injection 6/20/18  -Chronic neck and back pain, fibromyalgia----on neurontin and norco every 6 hours routinely, last epidural injection 6/26/18  -HTN---controlled   -CKD----stable  -Hypothyroidism---on home med  -Asthma---on routine inhalers  -Hx tobacco abuse, 30 pack year history----Quit 3/2018  -Bipolar disorder----well managed on current regimen  -Post op anemia/TCP----expected blood loss, will monitor  -Leukocytosis----resolved  -Statin allergy/intolerance----myalgias     Continue routine care.   Increase metoprolol for HR/BP control.  PO lasix.  Small left apical pneumo slightly increased in size.   D/w Dr. Tobias-- ok to remove remaining chest tube.   Increase activity and encourage pulm toilet.    Vic Wong PA-C  07/23/18  9:18 AM

## 2018-07-23 NOTE — THERAPY TREATMENT NOTE
Acute Care - Physical Therapy Treatment Note  Psychiatric     Patient Name: Angeles Oquendo  : 1965  MRN: [id number]  Today's Date: 2018  Onset of Illness/Injury or Date of Surgery: 18  Date of Referral to PT: 18  Referring Physician: Jatinder    Admit Date: 2018    Visit Dx:    ICD-10-CM ICD-9-CM   1. Coronary artery disease involving native coronary artery of native heart with angina pectoris (CMS/HCC) I25.119 414.01     413.9   2. Coronary artery disease involving native heart without angina pectoris, unspecified vessel or lesion type I25.10 414.01   3. Mitral valve insufficiency, unspecified etiology I34.0 424.0   4. S/P CABG x 4 Z95.1 V45.81     Patient Active Problem List   Diagnosis   • Severe mitral regurgitation   • Coronary artery disease involving native coronary artery of native heart with angina pectoris (CMS/HCC)   • Mitral valve insufficiency   • Coronary artery disease involving native heart without angina pectoris       Therapy Treatment          Rehabilitation Treatment Summary     Row Name 18 0854             Treatment Time/Intention    Discipline physical therapist  -MD      Document Type therapy note (daily note)  -MD      Subjective Information complains of;fatigue  -MD      Mode of Treatment physical therapy  -MD      Patient/Family Observations Pt up in chair showing no signs of acute distress.  -MD      Therapy Frequency (PT Clinical Impression) daily  -MD      Existing Precautions/Restrictions fall;cardiac;sternal  -MD      Recorded by [MD] Kelly Spears, PT 18 0856      Row Name 18 0854             Cognitive Assessment/Intervention- PT/OT    Orientation Status (Cognition) oriented x 3  -MD      Follows Commands (Cognition) WNL  -MD      Recorded by [MD] Kelly Spears, PT 18 0856      Row Name 18 0854             Bed Mobility Assessment/Treatment    Sit-Supine [place] (Bed Mobility) verbal cues;minimum assist (75% patient  effort);moderate assist (50% patient effort);2 person assist  -MD      Bed Mobility, Safety Issues decreased use of legs for bridging/pushing  -MD      Recorded by [MD] Kelly Spears, PT 07/23/18 0856      Row Name 07/23/18 0854             Sit-Stand Transfer    Sit-Stand [place] (Transfers) minimum assist (75% patient effort)  -MD      Recorded by [MD] Kelly Spears, PT 07/23/18 0856      Row Name 07/23/18 0854             Stand-Sit Transfer    Stand-Sit [place] (Transfers) verbal cues;contact guard  -MD      Recorded by [MD] Kelly Spears, PT 07/23/18 0856      Row Name 07/23/18 0854             Gait/Stairs Assessment/Training    [place] Level (Gait) contact guard;minimum assist (75% patient effort)  -MD      Distance in Feet (Gait) 80  -MD      Pattern (Gait) swing-through  -MD      Deviations/Abnormal Patterns (Gait) festinating/shuffling;gait speed decreased;stride length decreased;solitario decreased  -MD      Bilateral Gait Deviations forward flexed posture  -MD      Recorded by [MD] Kelly Spears, PT 07/23/18 0856      Row Name 07/23/18 0854             Positioning and Restraints    Pre-Treatment Position sitting in chair/recliner  -MD      Post Treatment Position bed  -MD      In Bed supine;call light within reach;exit alarm on  -MD2      Recorded by [MD] Kelly Spears, PT 07/23/18 0856  [MD2] Kelly Spears, PT 07/23/18 0857      Row Name                Wound 07/19/18 0957 chest incision    Wound - Properties Group Date first assessed: 07/19/18 [SR] Time first assessed: 0957 [SR] Location: chest [SR] Type: incision [SR] Recorded by:  [SR] Jackie Dia RN 07/19/18 0957    Row Name                Wound 07/19/18 0957 Right leg incision    Wound - Properties Group Date first assessed: 07/19/18 [SR] Time first assessed: 0957 [SR] Side: Right [SR] Location: leg [SR] Type: incision [SR] Recorded by:  [SR] Jackie Dia RN 07/19/18 0957      User Key  (r) = Recorded By, (t) = Taken By, (c) = Cosigned By    Initials Name  Effective Dates Discipline    MD Kelly Spears, PT 04/03/18 -  PT    SR Jackie Dia RN 06/16/16 -  Nurse          Wound 07/19/18 0957 chest incision (Active)   Dressing Appearance dry;intact 7/23/2018  8:36 AM   Closure Liquid skin adhesive 7/23/2018  8:36 AM   Base dressing in place, unable to visualize 7/22/2018  4:30 PM   Periwound redness 7/23/2018  8:36 AM   Drainage Amount none 7/23/2018  8:36 AM       Wound 07/19/18 0957 Right leg incision (Active)   Dressing Appearance dry;intact 7/23/2018  8:36 AM   Closure Liquid skin adhesive 7/23/2018  8:36 AM   Drainage Amount none 7/23/2018  8:36 AM             Physical Therapy Education     Title: PT OT SLP Therapies (Done)     Topic: Physical Therapy (Done)     Point: Mobility training (Done)    Learning Progress Summary     Learner Status Readiness Method Response Comment Documented by    Patient Done Acceptance E,TB VU,DU  CW 07/22/18 0932     Done Acceptance E,TB VU,DU  CW 07/21/18 0915          Point: Home exercise program (Done)    Learning Progress Summary     Learner Status Readiness Method Response Comment Documented by    Patient Done Acceptance E,TB VU,DU  CW 07/22/18 0932     Done Acceptance E,TB VU,DU  CW 07/21/18 0915          Point: Body mechanics (Done)    Learning Progress Summary     Learner Status Readiness Method Response Comment Documented by    Patient Done Acceptance E,TB VU,DU  CW 07/22/18 0932     Done Acceptance E,TB VU,DU  CW 07/21/18 0915          Point: Precautions (Done)    Learning Progress Summary     Learner Status Readiness Method Response Comment Documented by    Patient Done Acceptance E VU  MD 07/23/18 0900     Done Acceptance E,TB VU,DU  CW 07/22/18 0932     Done Acceptance E,TB VU,DU  CW 07/21/18 0915     Done Acceptance E VU  MD 07/20/18 0905                      User Key     Initials Effective Dates Name Provider Type Discipline    MD 04/03/18 -  Kelly Spears, PT Physical Therapist PT    CW 03/07/18 -  Paulino Kelly, PTA  Physical Therapy Assistant PT                    PT Recommendation and Plan  Anticipated Discharge Disposition (PT): home with home health  Planned Therapy Interventions (PT Eval): bed mobility training, gait training, stair training, transfer training  Therapy Frequency (PT Clinical Impression): daily  Outcome Summary/Treatment Plan (PT)  Anticipated Discharge Disposition (PT): home with home health  Plan of Care Reviewed With: patient  Progress: improving  Outcome Summary: Pt progress limited this am due to fatigue.  Pt required more assistance for safety with transfers and ambulation.          Outcome Measures     Row Name 07/23/18 0800 07/22/18 0900 07/21/18 0900       How much help from another person do you currently need...    Turning from your back to your side while in flat bed without using bedrails? 2  -MD 2  -CW 2  -CW    Moving from lying on back to sitting on the side of a flat bed without bedrails? 2  -MD 2  -CW 2  -CW    Moving to and from a bed to a chair (including a wheelchair)? 3  -MD 3  -CW 3  -CW    Standing up from a chair using your arms (e.g., wheelchair, bedside chair)? 3  -MD 3  -CW 3  -CW    Climbing 3-5 steps with a railing? 2  -MD 2  -CW 2  -CW    To walk in hospital room? 3  -MD 3  -CW 3  -CW    AM-PAC 6 Clicks Score 15  -MD 15  -CW 15  -CW       Functional Assessment    Outcome Measure Options AM-PAC 6 Clicks Basic Mobility (PT)  -MD AM-PAC 6 Clicks Basic Mobility (PT)  -CW AM-PAC 6 Clicks Basic Mobility (PT)  -CW      User Key  (r) = Recorded By, (t) = Taken By, (c) = Cosigned By    Initials Name Provider Type    MD Kelly Spears, PT Physical Therapist    CW Paulino Kelly, PTA Physical Therapy Assistant           Time Calculation:         PT Charges     Row Name 07/23/18 0853             Time Calculation    Start Time 0939  -MD      Stop Time 0949  -MD      Time Calculation (min) 10 min  -MD      PT Received On 07/23/18  -MD      PT - Next Appointment 07/24/18  -MD        User Key   (r) = Recorded By, (t) = Taken By, (c) = Cosigned By    Initials Name Provider Type    MD Kelly Spears, PT Physical Therapist        Therapy Suggested Charges     Code   Minutes Charges    None           Therapy Charges for Today     Code Description Service Date Service Provider Modifiers Qty    91179455588 HC PT THER PROC EA 15 MIN 7/23/2018 Kelly Spears, PT GP 1          PT G-Codes  Outcome Measure Options: AM-PAC 6 Clicks Basic Mobility (PT)    Kelly Spears, PT  7/23/2018

## 2018-07-23 NOTE — PLAN OF CARE
Problem: Patient Care Overview  Goal: Plan of Care Review   07/23/18 0857   Coping/Psychosocial   Plan of Care Reviewed With patient   OTHER   Outcome Summary Pt progress limited this am due to fatigue. Pt required more assistance for safety with transfers and ambulation.

## 2018-07-23 NOTE — PROGRESS NOTES
LOS: 4 days   Patient Care Team:  Charlotte Johnson MD as PCP - General  Charlotte Johnson MD as PCP - Family Medicine  Janeth Young MD as Consulting Physician (Endocrinology)  Jim Schafer MD as Consulting Physician (Rheumatology)  Hari Merritt MD as Consulting Physician (Pain Medicine)  Cesar Remy MD as Consulting Physician (Psychiatry)  Jesus James MD as Consulting Physician (Cardiology)    Chief Complaint: Follow-up for CABG, mitral valve repair.    Interval History: Not sleeping well and tired.  Pain at incision site better.  No SOA.  In NSR.  Friction rub noted on exam today.     Vital Signs:  Temp:  [98.1 °F (36.7 °C)-98.6 °F (37 °C)] 98.6 °F (37 °C)  Heart Rate:  [69-79] 79  Resp:  [16-18] 16  BP: (102-133)/(59-80) 133/80    Intake/Output Summary (Last 24 hours) at 07/23/18 1044  Last data filed at 07/23/18 0949   Gross per 24 hour   Intake              840 ml   Output             2550 ml   Net            -1710 ml       Physical Exam:   General Appearance:    No acute distress, alert and oriented x4   Lungs:     Clear to auscultation bilaterally     Heart:    Regular rhythm and normal rate. + Rub.   Abdomen:     Soft, non-tender, non-distended.    Extremities:   Moves all extremities well.  No clubbing, cyanosis, or edema.     Results Review:      Results from last 7 days  Lab Units 07/23/18  0308   SODIUM mmol/L 139   POTASSIUM mmol/L 3.8   CHLORIDE mmol/L 103   CO2 mmol/L 25.2   BUN mg/dL 21*   CREATININE mg/dL 0.91   GLUCOSE mg/dL 107*   CALCIUM mg/dL 9.1           Results from last 7 days  Lab Units 07/23/18  0308   WBC 10*3/mm3 7.34   HEMOGLOBIN g/dL 10.6*   HEMATOCRIT % 34.3*   PLATELETS 10*3/mm3 132*       Results from last 7 days  Lab Units 07/20/18  0300 07/19/18  1141 07/18/18  0836   INR  1.36* 1.51* 1.05   APTT seconds  --  34.0 30.4       Results from last 7 days  Lab Units 07/18/18  0836   CHOLESTEROL mg/dL 170       Results from last 7 days  Lab  Units 07/20/18  0300   MAGNESIUM mg/dL 2.6       Results from last 7 days  Lab Units 07/18/18  0836   CHOLESTEROL mg/dL 170   TRIGLYCERIDES mg/dL 125   HDL CHOL mg/dL 46   LDL CHOL mg/dL 99       I reviewed the patient's new clinical results.        Assessment:  1. 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 Coronary artery disease   3. Status post 4 vessel CABG and mitral valve repair 7/19/2018  4. Pre-op EF 45%  5. Diabetes - on home insulin pump  6. Rheumatoid arthritis  7. Post-op anemia (expected)  8. Post-op thrombocytopenia   9. Statin intolerance   10. Bipolar disorder    Plan:  -Switch metoprolol to Coreg given cardiomyopathy.  -Friction rub on exam - will check echocardiogram today.  -Continue ASA.  -Continue Lasix 40 mg po qday.  -Statin intolerant.  -Small PTX - hopefully chest tube out soon.    Jesus James MD  07/23/18  10:44 AM

## 2018-07-24 ENCOUNTER — APPOINTMENT (OUTPATIENT)
Dept: GENERAL RADIOLOGY | Facility: HOSPITAL | Age: 53
End: 2018-07-24

## 2018-07-24 VITALS
HEIGHT: 64 IN | DIASTOLIC BLOOD PRESSURE: 75 MMHG | OXYGEN SATURATION: 95 % | TEMPERATURE: 98.4 F | SYSTOLIC BLOOD PRESSURE: 136 MMHG | BODY MASS INDEX: 36.26 KG/M2 | HEART RATE: 73 BPM | WEIGHT: 212.4 LBS | RESPIRATION RATE: 18 BRPM

## 2018-07-24 DIAGNOSIS — I34.0 MITRAL VALVE INSUFFICIENCY, UNSPECIFIED ETIOLOGY: ICD-10-CM

## 2018-07-24 LAB
ANION GAP SERPL CALCULATED.3IONS-SCNC: 12 MMOL/L
BUN BLD-MCNC: 21 MG/DL (ref 6–20)
BUN/CREAT SERPL: 25.3 (ref 7–25)
CALCIUM SPEC-SCNC: 9.1 MG/DL (ref 8.6–10.5)
CHLORIDE SERPL-SCNC: 103 MMOL/L (ref 98–107)
CO2 SERPL-SCNC: 25 MMOL/L (ref 22–29)
CREAT BLD-MCNC: 0.83 MG/DL (ref 0.57–1)
GFR SERPL CREATININE-BSD FRML MDRD: 72 ML/MIN/1.73
GLUCOSE BLD-MCNC: 111 MG/DL (ref 65–99)
GLUCOSE BLDC GLUCOMTR-MCNC: 102 MG/DL (ref 70–130)
GLUCOSE BLDC GLUCOMTR-MCNC: 122 MG/DL (ref 70–130)
POTASSIUM BLD-SCNC: 3.8 MMOL/L (ref 3.5–5.2)
SODIUM BLD-SCNC: 140 MMOL/L (ref 136–145)

## 2018-07-24 PROCEDURE — 25010000002 ENOXAPARIN PER 10 MG: Performed by: THORACIC SURGERY (CARDIOTHORACIC VASCULAR SURGERY)

## 2018-07-24 PROCEDURE — 94799 UNLISTED PULMONARY SVC/PX: CPT

## 2018-07-24 PROCEDURE — 82962 GLUCOSE BLOOD TEST: CPT

## 2018-07-24 PROCEDURE — 71045 X-RAY EXAM CHEST 1 VIEW: CPT

## 2018-07-24 PROCEDURE — 97110 THERAPEUTIC EXERCISES: CPT

## 2018-07-24 PROCEDURE — 99232 SBSQ HOSP IP/OBS MODERATE 35: CPT | Performed by: INTERNAL MEDICINE

## 2018-07-24 PROCEDURE — 99024 POSTOP FOLLOW-UP VISIT: CPT | Performed by: NURSE PRACTITIONER

## 2018-07-24 PROCEDURE — 80048 BASIC METABOLIC PNL TOTAL CA: CPT | Performed by: THORACIC SURGERY (CARDIOTHORACIC VASCULAR SURGERY)

## 2018-07-24 RX ORDER — HYDROCODONE BITARTRATE AND ACETAMINOPHEN 5; 325 MG/1; MG/1
1 TABLET ORAL EVERY 4 HOURS PRN
Qty: 60 TABLET | Refills: 0 | Status: SHIPPED | OUTPATIENT
Start: 2018-07-24 | End: 2018-07-29

## 2018-07-24 RX ORDER — FUROSEMIDE 40 MG/1
40 TABLET ORAL DAILY
Qty: 30 TABLET | Refills: 1 | Status: SHIPPED | OUTPATIENT
Start: 2018-07-25 | End: 2018-08-31 | Stop reason: SDUPTHER

## 2018-07-24 RX ORDER — CARVEDILOL 6.25 MG/1
6.25 TABLET ORAL EVERY 12 HOURS SCHEDULED
Qty: 60 TABLET | Refills: 1 | Status: SHIPPED | OUTPATIENT
Start: 2018-07-24 | End: 2018-08-31 | Stop reason: SDUPTHER

## 2018-07-24 RX ORDER — CARVEDILOL 6.25 MG/1
TABLET ORAL
Qty: 180 TABLET | Refills: 1 | OUTPATIENT
Start: 2018-07-24

## 2018-07-24 RX ORDER — ASPIRIN 81 MG/1
81 TABLET ORAL DAILY
Qty: 100 TABLET | Refills: 99 | Status: SHIPPED | OUTPATIENT
Start: 2018-07-25

## 2018-07-24 RX ORDER — POTASSIUM CHLORIDE 750 MG/1
20 CAPSULE, EXTENDED RELEASE ORAL DAILY
Qty: 180 CAPSULE | Refills: 1 | OUTPATIENT
Start: 2018-07-24

## 2018-07-24 RX ORDER — FUROSEMIDE 40 MG/1
40 TABLET ORAL DAILY
Qty: 90 TABLET | Refills: 1 | OUTPATIENT
Start: 2018-07-24

## 2018-07-24 RX ORDER — COLESEVELAM 180 1/1
TABLET ORAL
Qty: 540 TABLET | Refills: 1 | OUTPATIENT
Start: 2018-07-24

## 2018-07-24 RX ORDER — ACETAMINOPHEN 650 MG
TABLET, EXTENDED RELEASE ORAL 2 TIMES DAILY
Qty: 1 EACH | Refills: 1 | Status: SHIPPED | OUTPATIENT
Start: 2018-07-24 | End: 2018-08-30

## 2018-07-24 RX ORDER — COLESEVELAM 180 1/1
1875 TABLET ORAL 2 TIMES DAILY WITH MEALS
Qty: 180 TABLET | Refills: 1 | Status: SHIPPED | OUTPATIENT
Start: 2018-07-24 | End: 2018-08-23

## 2018-07-24 RX ORDER — POTASSIUM CHLORIDE 750 MG/1
20 CAPSULE, EXTENDED RELEASE ORAL DAILY
Qty: 60 CAPSULE | Refills: 1 | Status: SHIPPED | OUTPATIENT
Start: 2018-07-24 | End: 2018-08-31 | Stop reason: SDUPTHER

## 2018-07-24 RX ADMIN — ACETAMINOPHEN 650 MG: 325 TABLET, FILM COATED ORAL at 02:44

## 2018-07-24 RX ADMIN — OXYCODONE HYDROCHLORIDE 5 MG: 5 TABLET ORAL at 08:22

## 2018-07-24 RX ADMIN — OXYCODONE HYDROCHLORIDE 5 MG: 5 TABLET ORAL at 02:44

## 2018-07-24 RX ADMIN — ASPIRIN 81 MG: 81 TABLET ORAL at 08:23

## 2018-07-24 RX ADMIN — POTASSIUM CHLORIDE 20 MEQ: 750 CAPSULE, EXTENDED RELEASE ORAL at 08:22

## 2018-07-24 RX ADMIN — CARVEDILOL 6.25 MG: 6.25 TABLET, FILM COATED ORAL at 08:22

## 2018-07-24 RX ADMIN — FUROSEMIDE 40 MG: 40 TABLET ORAL at 08:22

## 2018-07-24 RX ADMIN — MUPIROCIN 10 APPLICATION: 20 OINTMENT TOPICAL at 08:24

## 2018-07-24 RX ADMIN — BUPROPION HYDROCHLORIDE 450 MG: 150 TABLET, EXTENDED RELEASE ORAL at 08:22

## 2018-07-24 RX ADMIN — PANTOPRAZOLE SODIUM 40 MG: 40 TABLET, DELAYED RELEASE ORAL at 08:22

## 2018-07-24 RX ADMIN — BUDESONIDE AND FORMOTEROL FUMARATE DIHYDRATE 2 PUFF: 160; 4.5 AEROSOL RESPIRATORY (INHALATION) at 11:01

## 2018-07-24 RX ADMIN — LEVOTHYROXINE SODIUM 200 MCG: 100 TABLET ORAL at 08:22

## 2018-07-24 RX ADMIN — ACETAMINOPHEN 650 MG: 325 TABLET, FILM COATED ORAL at 08:22

## 2018-07-24 RX ADMIN — GUAIFENESIN 1200 MG: 600 TABLET, EXTENDED RELEASE ORAL at 08:22

## 2018-07-24 NOTE — PLAN OF CARE
Problem: Patient Care Overview  Goal: Plan of Care Review  Outcome: Ongoing (interventions implemented as appropriate)   07/23/18 2018   Coping/Psychosocial   Plan of Care Reviewed With patient   Plan of Care Review   Progress improving   OTHER   Outcome Summary inhalers scheduled/promote respiratory health

## 2018-07-24 NOTE — PROGRESS NOTES
LOS: 5 days   Patient Care Team:  Charlotte Johnson MD as PCP - General  Charlotte Johnson MD as PCP - Family Medicine  Janeth Young MD as Consulting Physician (Endocrinology)  Jim Schafer MD as Consulting Physician (Rheumatology)  Hari Merritt MD as Consulting Physician (Pain Medicine)  Cesar Remy MD as Consulting Physician (Psychiatry)  Jesus James MD as Consulting Physician (Cardiology)    Chief Complaint: Follow-up for CABG, mitral valve repair.    Interval History: Mild incisional pain.  Denied SOA.  In NSR.    Vital Signs:  Temp:  [97.6 °F (36.4 °C)-98.4 °F (36.9 °C)] 98.4 °F (36.9 °C)  Heart Rate:  [71-75] 74  Resp:  [16-18] 18  BP: (130-170)/(75-87) 136/75    Intake/Output Summary (Last 24 hours) at 07/24/18 0920  Last data filed at 07/24/18 0748   Gross per 24 hour   Intake             1320 ml   Output                0 ml   Net             1320 ml       Physical Exam:   General Appearance:    No acute distress, alert and oriented x4   Lungs:     Decreased BS bilaterally     Heart:    Regular rhythm and normal rate.  Rub noted.    Abdomen:     Soft, non-tender, non-distended.    Extremities:   Moves all extremities well.  No clubbing, cyanosis, or edema.     Results Review:      Results from last 7 days  Lab Units 07/24/18  0301   SODIUM mmol/L 140   POTASSIUM mmol/L 3.8   CHLORIDE mmol/L 103   CO2 mmol/L 25.0   BUN mg/dL 21*   CREATININE mg/dL 0.83   GLUCOSE mg/dL 111*   CALCIUM mg/dL 9.1           Results from last 7 days  Lab Units 07/23/18  0308   WBC 10*3/mm3 7.34   HEMOGLOBIN g/dL 10.6*   HEMATOCRIT % 34.3*   PLATELETS 10*3/mm3 132*       Results from last 7 days  Lab Units 07/20/18  0300 07/19/18  1141 07/18/18  0836   INR  1.36* 1.51* 1.05   APTT seconds  --  34.0 30.4       Results from last 7 days  Lab Units 07/18/18  0836   CHOLESTEROL mg/dL 170       Results from last 7 days  Lab Units 07/20/18  0300   MAGNESIUM mg/dL 2.6       Results from last 7  days  Lab Units 07/18/18  0836   CHOLESTEROL mg/dL 170   TRIGLYCERIDES mg/dL 125   HDL CHOL mg/dL 46   LDL CHOL mg/dL 99       I reviewed the patient's new clinical results.        Assessment:  1. 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 Coronary artery disease   3. Status post 4 vessel CABG and mitral valve repair 7/19/2018  4. EF 45%  5. Diabetes - on home insulin pump  6. Rheumatoid arthritis  7. Post-op anemia (expected)  8. Post-op thrombocytopenia   9. Statin intolerance   10. Bipolar disorder  11. Small pericardial effusion    Plan:  -On Coreg 6.25 mg bid.  In NSR.  -Small pericardial effusion on echo.  No evidence of tamponade.  -Gradients across MV repair elevated, although this can be influenced by current anemia and other factors.  Will reassess this as an outpatient eventually.  -Continue ASA.  -Continue Lasix 40 mg po qday.  Some pulmonary congestion on CXR, but patient not SOA with exertion.    -Statin intolerant.     Ok to discharge per Cardiology.  Follow-ups placed.  Thanks.    Jesus James MD  07/24/18  9:20 AM

## 2018-07-24 NOTE — PLAN OF CARE
Problem: Patient Care Overview  Goal: Plan of Care Review   07/24/18 0951   Coping/Psychosocial   Plan of Care Reviewed With patient   Plan of Care Review   Progress improving   OTHER   Outcome Summary Pt demonstrates good progress w/PT; demonstrates improved endurance as evidenced by tolerance of increased gait distance and ability to climb 3 stairs. Pt hopeful to DC home later today; no problems anticipated.

## 2018-07-24 NOTE — DISCHARGE SUMMARY
Date of Admission: 7/19/2018  Date of Discharge:  7/24/2018    Discharge Diagnosis: Mitral regurgitation status post mitral valve repair/ring, CAD status post CABG, LV systolic dysfunction EF 45%, diabetes mellitus type 2 on insulin pump, rheumatoid arthritis, chronic neck and back pain/fibromyalgia, hypertension, chronic kidney disease, hypothyroidism, asthma, recent tobacco cessation, bipolar disorder, statin allergies/intolerance    Presenting Problem/History of Present Illness  Mitral valve insufficiency, unspecified etiology [I34.0]  Coronary artery disease involving native heart without angina pectoris, unspecified vessel or lesion type [I25.10]  Coronary artery disease involving native heart without angina pectoris, unspecified vessel or lesion type [I25.10]     Hospital Course  Patient is a 53 y.o. female presented for elective heart surgery.  She underwent successful CABG ×4 with LIMA to LAD, SVG sequential to PDA-PLV and individual to D2, mitral valve repair with ring annuloplasty, endovascular harvest from right leg on 7/19/2018 with Dr. Tobias (see op report for full details).  The patient's postoperative course was relatively uneventful, we consulted endocrinology for assistance with diabetes management and she is normally on insulin pump at home.  Although the patient's tubes and lines were removed in a timely manner, the patient's routine pain medications were resumed for assistance with postoperative and chronic pain.  She has mild LV dysfunction, we did not start an Ace inhibitor during this hospitalization as we continued to titrate her beta blocker and she has a baseline chronic kidney disease, she can be reevaluated on an outpatient basis with her cardiologist for initiation as needed.  The patient states that she has had a history of myalgias and statin intolerance, we will initiate WelChol today and cardiology can reevaluate for preferred agent in follow up.  The patient has met physical therapy  goals, is ambulating without assistance, is eating and drinking sufficiently, urinating and defecating without issue, and is now deemed ready for discharge.  Her sternum is slightly reddened along incision line proximally, she is to shower daily and wash with Dial soap and paint the area with Betadine for 1 week.  She is to follow-up in our office in 4-6 weeks as per appointment below, to follow-up with her endocrinologist in the next 3 weeks, her cardiologist APRN in 1 week, and her cardiologist within 1 month.  Home health services will follow for routine sternal precaution education and routine incision surveillance.      Procedures Performed  Procedure(s):  LAURO STERNOTOMY CORONARY ARTERY BYPASS GRAFT TIMES 4 USING LEFT INTERNAL MAMMARY ARTERY AND RIGHT GREATER SAPHENOUS VEIN GRAFT PER ENDOSCOPIC VEIN HARVESTING, MITRAL VALVE REPAIR AND PRP       Consults:   Consults     Date and Time Order Name Status Description    7/19/2018 1140 Inpatient Endocrinology Consult      7/19/2018 1140 Inpatient Cardiology Consult Completed           Pertinent Test Results:    Lab Results   Component Value Date    WBC 7.34 07/23/2018    HGB 10.6 (L) 07/23/2018    HCT 34.3 (L) 07/23/2018    MCV 95.3 07/23/2018     (L) 07/23/2018      Lab Results   Component Value Date    GLUCOSE 111 (H) 07/24/2018    CALCIUM 9.1 07/24/2018     07/24/2018    K 3.8 07/24/2018    CO2 25.0 07/24/2018     07/24/2018    BUN 21 (H) 07/24/2018    CREATININE 0.83 07/24/2018    EGFRIFAFRI 80 06/01/2015    EGFRIFNONA 72 07/24/2018    BCR 25.3 (H) 07/24/2018    ANIONGAP 12.0 07/24/2018     Lab Results   Component Value Date    INR 1.36 (H) 07/20/2018    PROTIME 16.5 (H) 07/20/2018         Condition on Discharge: Stable   Vital Signs  Temp:  [97.6 °F (36.4 °C)-98.4 °F (36.9 °C)] 98.4 °F (36.9 °C)  Heart Rate:  [71-75] 74  Resp:  [16-18] 18  BP: (130-170)/(75-87) 136/75      Discharge Disposition  Home-Health Care Svc    Discharge Medications      Discharge Medications      New Medications      Instructions Start Date   aspirin 81 MG EC tablet   81 mg, Oral, Daily      colesevelam 625 MG tablet  Commonly known as:  WELCHOL   1,875 mg, Oral, 2 Times Daily With Meals      furosemide 40 MG tablet  Commonly known as:  LASIX   40 mg, Oral, Daily      HYDROcodone-acetaminophen 5-325 MG per tablet  Commonly known as:  NORCO  Replaces:  HYDROcodone-acetaminophen  MG per tablet   1 tablet, Oral, Every 4 Hours PRN      potassium chloride 10 MEQ CR capsule  Commonly known as:  MICRO-K   20 mEq, Oral, Daily      povidone-iodine 10 % external solution  Commonly known as:  BETADINE   Topical, 2 Times Daily, Paint sternal incision and chest tube sites twice daily         Changes to Medications      Instructions Start Date   carvedilol 6.25 MG tablet  Commonly known as:  COREG  What changed:  · medication strength  · how much to take   6.25 mg, Oral, Every 12 Hours Scheduled         Continue These Medications      Instructions Start Date   AMBIEN 10 MG tablet  Generic drug:  zolpidem   10 mg, Oral, Nightly PRN      buPROPion  MG 24 hr tablet  Commonly known as:  WELLBUTRIN XL   450 mg, Oral, Daily      clonazePAM 1 MG tablet  Commonly known as:  KlonoPIN   1 mg, Oral, 4 Times Daily PRN      fexofenadine 180 MG tablet  Commonly known as:  ALLEGRA   180 mg, Oral, Daily      gabapentin 600 MG tablet  Commonly known as:  NEURONTIN   600 mg, Oral, 3 Times Daily      insulin lispro 100 UNIT/ML injection  Commonly known as:  humaLOG   Subcutaneous, 2 UNITS ON 4 CARBS BASAL RATE OF 3 PER HOUR. VIA INSULIN PUMP      levothyroxine 200 MCG tablet  Commonly known as:  SYNTHROID, LEVOTHROID   200 mcg, Oral, Daily      mometasone-formoterol 100-5 MCG/ACT inhaler  Commonly known as:  DULERA 100   2 puffs, Inhalation, 2 Times Daily - RT      montelukast 10 MG tablet  Commonly known as:  SINGULAIR   10 mg, Oral, Nightly      omeprazole 40 MG capsule  Commonly known as:   priLOSEC   40 mg, Oral, Daily      QUEtiapine 300 MG tablet  Commonly known as:  SEROquel   200 mg, Oral, Nightly      topiramate 100 MG tablet  Commonly known as:  TOPAMAX   300 mg, Oral, Nightly      TRINTELLIX 5 MG tablet  Generic drug:  Vortioxetine HBr   5 mg, Oral, Daily With Breakfast         Stop These Medications    CHLORHEXIDINE GLUCONATE CLOTH EX     HYDROcodone-acetaminophen  MG per tablet  Commonly known as:  NORCO  Replaced by:  HYDROcodone-acetaminophen 5-325 MG per tablet     ibuprofen 800 MG tablet  Commonly known as:  ADVIL,MOTRIN     mupirocin 2 % ointment  Commonly known as:  BACTROBAN     olmesartan-hydrochlorothiazide 40-12.5 MG per tablet  Commonly known as:  BENICAR HCT     PERIDEX MT     predniSONE 5 MG tablet  Commonly known as:  DELTASONE     SIMPONI ARIA IV            Discharge Diet:   Diet Instructions     Diet: Cardiac       Discharge Diet:  Cardiac          Activity at Discharge:   Activity Instructions     Discharge Activity Restrictions       1) No driving for 2 weeks and no longer taking narcotics, ride in the back seat for 2 weeks.   2) May shower, no tub bathing, no immersion in pools or hot tubs   3) Do not lift / push / pull more then 10 lbs.  4) Walk at least 10 minutes at least 3 times daily          Follow-up Appointments  Future Appointments  Date Time Provider Department Center   8/30/2018 1:00 PM KALYANI Sims [facility] None     Additional Instructions for the Follow-ups that You Need to Schedule     Call MD With Problems / Concerns    As directed      Instructions:  Call office at [phone] for any drainage, increased redness, or fever over 100.5    Order Comments:  Instructions:  Call office at [phone] for any drainage, increased redness, or fever over 100.5          Discharge Follow-up with PCP    As directed      Currently Documented PCP:  Charlotte Johnson MD  PCP Phone Number:  [phone]    Follow Up Details:  in 1 week         Discharge  Follow-up with Specialty: Cardiologist KALYANI/PA; 1 Week    As directed      Specialty:  Cardiologist KALYANI/PA    Follow Up:  1 Week    Follow Up Details:  bring all prescription bottles to appointment, call for appointment         Discharge Follow-up with Specialty: Endocrinology; 3 Weeks    As directed      Specialty:  Endocrinology    Follow Up:  3 Weeks    Follow Up Details:  call for appointment         Discharge Follow-up with Specified Provider: Cardiologist; 1 Month    As directed      To:  Cardiologist    Follow Up:  1 Month    Follow Up Details:  call for appointment, bring all medication bottles to appointment         Discharge Follow-up with Specified Provider: Dr. Tobias's APRN    As directed      To:  Dr. Tobias's APRN    Follow Up Details:  August 30th at 1:00pm, bring all current medications to appointment         Referral to Home Health    As directed      Face to Face Visit Date:  7/24/2018    Follow-up Provider for Plan of Care?:  I will be treating the patient on an ongoing basis.  Please send me the Plan of Care for signature.    Follow-up Provider:  INDIO TOBIAS [[id number]]    Reason/Clinical Findings:  post op open heart    Describe mobility limitations that make leaving home difficult:  weakness    Nursing/Therapeutic Services Requested:  Skilled Nursing    Skilled nursing orders:  Post CABG care (sternal precaution education)    Frequency:  1 Week 1               Test Results Pending at Discharge       KALYANI Sims  07/24/18  10:52 AM

## 2018-07-24 NOTE — THERAPY DISCHARGE NOTE
Acute Care - Physical Therapy Treatment Note/Discharge  [facility]     Patient Name: Angeles Oquendo  : 1965  MRN: [id number]  Today's Date: 2018  Onset of Illness/Injury or Date of Surgery: 18  Date of Referral to PT: 18  Referring Physician: Jatinder    Admit Date: 2018    Visit Dx:    ICD-10-CM ICD-9-CM   1. Coronary artery disease involving native coronary artery of native heart with angina pectoris (CMS/HCC) I25.119 414.01     413.9   2. Coronary artery disease involving native heart without angina pectoris, unspecified vessel or lesion type I25.10 414.01   3. Mitral valve insufficiency, unspecified etiology I34.0 424.0   4. S/P CABG x 4 Z95.1 V45.81     Patient Active Problem List   Diagnosis   • Severe mitral regurgitation   • Coronary artery disease involving native coronary artery of native heart with angina pectoris (CMS/HCC)   • Mitral valve insufficiency   • Coronary artery disease involving native heart without angina pectoris       Physical Therapy Education     Title: PT OT SLP Therapies (Resolved)     Topic: Physical Therapy (Resolved)     Point: Mobility training (Resolved)    Learning Progress Summary     Learner Status Readiness Method Response Comment Documented by    Patient Done Acceptance E,TB VU  EE 18 0948     Done Acceptance E,TB VUJERRI  CW 18 0932     Done Acceptance E,TB JERRI JUAN  CW 18 0915          Point: Home exercise program (Resolved)    Learning Progress Summary     Learner Status Readiness Method Response Comment Documented by    Patient Done Acceptance E,TB VU  EE 18 0948     Done Acceptance E,TB VUJERRI  CW 18 0932     Done Acceptance E,TB VUJERRI  CW 18 0915          Point: Body mechanics (Resolved)    Learning Progress Summary     Learner Status Readiness Method Response Comment Documented by    Patient Done Acceptance E,TB VU  EE 18 0948     Done Acceptance E,TB VU,DU  CW 18 0932     Done Acceptance E,TB  YESSICAJERRI  CW 07/21/18 0915          Point: Precautions (Resolved)    Learning Progress Summary     Learner Status Readiness Method Response Comment Documented by    Patient Done Acceptance E,TB YESSICA  EE 07/24/18 0948     Done Acceptance E YESSICA JONES 07/23/18 0900     Done Acceptance E,TB VU,DU  CW 07/22/18 0932     Done Acceptance E,TB YESSICAJERRI  CW 07/21/18 0915     Done Acceptance E YESSICA JONES 07/20/18 0905                      User Key     Initials Effective Dates Name Provider Type Discipline    EE 04/03/18 -  Madeline Braden, PT Physical Therapist PT    MD 04/03/18 -  Kelly Spears, PT Physical Therapist PT    CW 03/07/18 -  Paulino Kelly, PTA Physical Therapy Assistant PT                    PT Rehab Goals     Row Name 07/24/18 0900             Problem Specific Goal 1 (PT)    Progress/Outcome (Problem Specific Goal 1, PT) goal partially met   only attempted 3 stairs per pt request  -EE        User Key  (r) = Recorded By, (t) = Taken By, (c) = Cosigned By    Initials Name Provider Type Discipline    EE Madeline Braden, PT Physical Therapist PT        Therapy Treatment        Rehabilitation Treatment Summary     Row Name 07/24/18 0930             Treatment Time/Intention    Discipline physical therapist  -EE      Document Type therapy note (daily note);discharge treatment  -EE      Subjective Information no complaints  -EE      Mode of Treatment physical therapy  -EE      Patient/Family Observations Pt sitting up in chair in no acute distress.  -EE      Patient Effort good  -EE      Existing Precautions/Restrictions fall;cardiac;sternal  -EE      Recorded by [EE] Madeline Braden, PT 07/24/18 0948      Row Name 07/24/18 0930             Cognitive Assessment/Intervention- PT/OT    Orientation Status (Cognition) oriented x 3  -EE      Follows Commands (Cognition) WNL  -EE      Personal Safety Interventions fall prevention program maintained;nonskid shoes/slippers when out of bed;supervised activity  -EE      Recorded by [EE] Madeline Braden, PT  07/24/18 0948      Row Name 07/24/18 0930             Sit-Stand Transfer    Sit-Stand [place] (Transfers) contact guard  -EE      Recorded by [EE] Madeline Braden, PT 07/24/18 0948      Row Name 07/24/18 0930             Stand-Sit Transfer    Stand-Sit [place] (Transfers) contact guard  -EE      Recorded by [EE] Madeline Braden, PT 07/24/18 0948      Row Name 07/24/18 0930             Gait/Stairs Assessment/Training    [place] Level (Gait) contact guard  -EE      Distance in Feet (Gait) 200  -EE      Deviations/Abnormal Patterns (Gait) solitario decreased  -EE      Bilateral Gait Deviations heel strike decreased  -EE      [place] Level (Stairs) contact guard  -EE      Handrail Location (Stairs) left side (ascending)  -EE      Number of Steps (Stairs) 3  -EE      Ascending Technique (Stairs) step-to-step  -EE      Descending Technique (Stairs) step-to-step  -EE      Recorded by [EE] Madeline Braden, PT 07/24/18 0948      Row Name 07/24/18 0930             Positioning and Restraints    Pre-Treatment Position sitting in chair/recliner  -EE      Post Treatment Position chair  -EE      In Chair sitting;call light within reach;encouraged to call for assist  -EE      Recorded by [EE] Madeline Braden, PT 07/24/18 0948      Row Name 07/24/18 0930             Pain Scale: Numbers Pre/Post-Treatment    Pain Scale: Numbers, Pretreatment 2/10  -EE      Pain Scale: Numbers, Post-Treatment 2/10  -EE      Pain Location - Orientation incisional  -EE      Pain Location chest  -EE      Pain Intervention(s) Repositioned;Ambulation/increased activity;Medication (See MAR)  -EE      Recorded by [EE] Madeline Braden, PT 07/24/18 0948      Row Name                Wound 07/19/18 0957 chest incision    Wound - Properties Group Date first assessed: 07/19/18 [SR] Time first assessed: 0957 [SR] Location: chest [SR] Type: incision [SR] Recorded by:  [SR] Jackie Dia RN 07/19/18 0957    Row Name                Wound 07/19/18 0957 Right leg incision     Wound - Properties Group Date first assessed: 07/19/18 [SR] Time first assessed: 0957 [SR] Side: Right [SR] Location: leg [SR] Type: incision [SR] Recorded by:  [SR] Jackie Dia RN 07/19/18 0957      User Key  (r) = Recorded By, (t) = Taken By, (c) = Cosigned By    Initials Name Effective Dates Discipline    EE Madeline Braden, PT 04/03/18 -  PT    SR Jackie Dia RN 06/16/16 -  Nurse        Wound 07/19/18 0957 chest incision (Active)   Dressing Appearance open to air 7/24/2018  8:22 AM   Closure Liquid skin adhesive;Open to air 7/24/2018  8:22 AM   Periwound redness 7/24/2018  8:22 AM   Drainage Amount none 7/24/2018  8:22 AM       Wound 07/19/18 0957 Right leg incision (Active)   Dressing Appearance dry;intact 7/24/2018  8:22 AM   Closure Liquid skin adhesive 7/24/2018  8:22 AM   Drainage Amount none 7/24/2018  8:22 AM   Dressing Care, Wound open to air 7/24/2018  8:22 AM       PT Recommendation and Plan     Plan of Care Reviewed With: patient  Progress: improving  Outcome Summary: Pt demonstrates good progress w/PT; demonstrates improved endurance as evidenced by tolerance of increased gait distance and ability to climb 3 stairs. Pt hopeful to DC home later today; no problems anticipated.           Outcome Measures     Row Name 07/24/18 0900 07/23/18 0800 07/22/18 0900       How much help from another person do you currently need...    Turning from your back to your side while in flat bed without using bedrails? 3  -EE 2  -MD 2  -CW    Moving from lying on back to sitting on the side of a flat bed without bedrails? 3  -EE 2  -MD 2  -CW    Moving to and from a bed to a chair (including a wheelchair)? 3  -EE 3  -MD 3  -CW    Standing up from a chair using your arms (e.g., wheelchair, bedside chair)? 3  -EE 3  -MD 3  -CW    Climbing 3-5 steps with a railing? 3  -EE 2  -MD 2  -CW    To walk in hospital room? 3  -EE 3  -MD 3  -CW    AM-PAC 6 Clicks Score 18  -EE 15  -MD 15  -CW       Functional Assessment    Outcome  Measure Options AM-PAC 6 Clicks Basic Mobility (PT)  -EE AM-PAC 6 Clicks Basic Mobility (PT)  -MD AM-PAC 6 Clicks Basic Mobility (PT)  -CW      User Key  (r) = Recorded By, (t) = Taken By, (c) = Cosigned By    Initials Name Provider Type    EE Madeline Braden, PT Physical Therapist    MD Kelly Spears, PT Physical Therapist    CW Paulino Kelly, PTA Physical Therapy Assistant           Time Calculation:         PT Charges     Row Name 07/24/18 0951             Time Calculation    Start Time 0930  -EE      Stop Time 0939  -EE      Time Calculation (min) 9 min  -EE      PT Received On 07/24/18  -EE        User Key  (r) = Recorded By, (t) = Taken By, (c) = Cosigned By    Initials Name Provider Type    LUIGI Braden, SUDHEER Physical Therapist        Therapy Suggested Charges     Code   Minutes Charges    None             Therapy Charges for Today     Code Description Service Date Service Provider Modifiers Qty    21819333484 HC PT THER PROC EA 15 MIN 7/24/2018 Madeline Braden PT GP 1    74303836332 HC PT THER SUPP EA 15 MIN 7/24/2018 Madeline Braden PT GP 1          PT G-Codes  Outcome Measure Options: AM-PAC 6 Clicks Basic Mobility (PT)    PT Discharge Summary  Reason for Discharge: Discharge from facility  Outcomes Achieved: Patient able to partially acheive established goals    Madeline Braden PT  7/24/2018

## 2018-07-25 NOTE — PROGRESS NOTES
Case Management Discharge Note    Final Note: Pt d/c home 7/24/18 with [facility] scheduled to follow.      Destination     No service has been selected for the patient.      Durable Medical Equipment     No service has been selected for the patient.      Dialysis/Infusion     No service has been selected for the patient.      Home Medical Care     Service Request Status Selected Specialties Address Phone Number Fax Number    [facility] Health [facility] [address] [address] [phone]      Social Care     No service has been selected for the patient.        Other:  (private auto)    Final Discharge Disposition Code: 06 - home with home health care

## 2018-08-30 ENCOUNTER — OFFICE VISIT (OUTPATIENT)
Dept: CARDIAC SURGERY | Facility: CLINIC | Age: 53
End: 2018-08-30

## 2018-08-30 VITALS
HEART RATE: 73 BPM | HEIGHT: 64 IN | RESPIRATION RATE: 20 BRPM | DIASTOLIC BLOOD PRESSURE: 85 MMHG | WEIGHT: 198 LBS | BODY MASS INDEX: 33.8 KG/M2 | SYSTOLIC BLOOD PRESSURE: 126 MMHG | OXYGEN SATURATION: 99 % | TEMPERATURE: 98.4 F

## 2018-08-30 DIAGNOSIS — I25.119 CORONARY ARTERY DISEASE INVOLVING NATIVE CORONARY ARTERY OF NATIVE HEART WITH ANGINA PECTORIS (HCC): ICD-10-CM

## 2018-08-30 DIAGNOSIS — Z95.1 S/P CABG X 4: ICD-10-CM

## 2018-08-30 DIAGNOSIS — I34.0 SEVERE MITRAL REGURGITATION: Primary | ICD-10-CM

## 2018-08-30 DIAGNOSIS — Z98.890 S/P MVR (MITRAL VALVE REPAIR): ICD-10-CM

## 2018-08-30 PROCEDURE — 99024 POSTOP FOLLOW-UP VISIT: CPT | Performed by: PHYSICIAN ASSISTANT

## 2018-08-30 RX ORDER — METHOCARBAMOL 750 MG/1
750 TABLET, FILM COATED ORAL 2 TIMES DAILY PRN
COMMUNITY
End: 2021-11-01

## 2018-08-30 RX ORDER — HYDROCODONE BITARTRATE AND ACETAMINOPHEN 10; 325 MG/1; MG/1
1 TABLET ORAL EVERY 6 HOURS PRN
COMMUNITY
End: 2021-11-01

## 2018-08-30 NOTE — PROGRESS NOTES
"CARDIOVASCULAR SURGERY FOLLOW-UP PROGRESS NOTE  Chief Complaint: Post-op Follow Up        HPI:   Dear Dr. Johnson, Charlotte THAO MD and colleagues:    It was nice to see Angeles Oquendo in follow up today after cardiac surgery.  As you know, she is a 53 y.o. female with mitral regurgitation and multivessel coronary artery disease who underwent CABG x 4 and mitral valve repair at [facility] by Dr. Tobias on 7/19/2018. She did well postoperatively and continues to do well. She comes in today with no specific complaints.  Her activity level has been fair, but she states it is improving. She reports being able to tell that she is starting to have more energy and a greater desire to be active than she did prior to surgery. She states she has an appointment with Dr. James tomorrow and is looking forward to starting cardiac rehab. She has an intolerance to statins, but states she is taking WelChol and is also still taking her Aspirin and Coreg. She also reports having an upcoming appointment with her Rheumatologist in 2 weeks and is looking forward to restarting her medications for her Rheumatoid Arthritis.         Physical Exam:         /85 (BP Location: Left arm, Patient Position: Sitting, Cuff Size: Adult)   Pulse 73   Temp 98.4 °F (36.9 °C) (Oral)   Resp 20   Ht 162.6 cm (64\")   Wt 89.8 kg (198 lb)   SpO2 99%   BMI 33.99 kg/m²   Heart:  regular rate and rhythm, S1, S2 normal, no murmur, click, rub or gallop  Lungs:  clear to auscultation bilaterally  Extremities:  no edema  Incision(s):  mid chest healing well, no significant drainage, no dehiscence, no significant erythema, scab present on inferior portion of sternal incision, right leg healing well, no significant drainage, no dehiscence, no significant erythema, sternum stable    Assessment/Plan:     S/P CABG and mitral valve repair. Overall, she is doing well.    No significant post-op complications.    Keep incisions clean and dry.  OK to drive if " not taking narcotic pain medicine.  OK to begin cardiac rehab.  Follow-up as scheduled with cardiology.  Follow-up as scheduled with PCP.  Follow-up with CT surgery prn.    No restrictions of activity.      Thank you for allowing me to participate in the care of your patient.    Regards,  ROXI Camp

## 2018-08-31 ENCOUNTER — OFFICE VISIT (OUTPATIENT)
Dept: CARDIOLOGY | Facility: CLINIC | Age: 53
End: 2018-08-31

## 2018-08-31 VITALS
HEIGHT: 65 IN | DIASTOLIC BLOOD PRESSURE: 72 MMHG | SYSTOLIC BLOOD PRESSURE: 104 MMHG | HEART RATE: 69 BPM | WEIGHT: 205 LBS | BODY MASS INDEX: 34.16 KG/M2

## 2018-08-31 DIAGNOSIS — Z95.1 S/P CABG X 4: ICD-10-CM

## 2018-08-31 DIAGNOSIS — Z98.890 S/P MVR (MITRAL VALVE REPAIR): ICD-10-CM

## 2018-08-31 DIAGNOSIS — I34.0 MITRAL VALVE INSUFFICIENCY, UNSPECIFIED ETIOLOGY: ICD-10-CM

## 2018-08-31 DIAGNOSIS — I25.10 CORONARY ARTERY DISEASE INVOLVING NATIVE HEART WITHOUT ANGINA PECTORIS, UNSPECIFIED VESSEL OR LESION TYPE: Primary | ICD-10-CM

## 2018-08-31 PROCEDURE — 93000 ELECTROCARDIOGRAM COMPLETE: CPT | Performed by: NURSE PRACTITIONER

## 2018-08-31 PROCEDURE — 99214 OFFICE O/P EST MOD 30 MIN: CPT | Performed by: NURSE PRACTITIONER

## 2018-08-31 RX ORDER — CARVEDILOL 6.25 MG/1
6.25 TABLET ORAL EVERY 12 HOURS SCHEDULED
Qty: 180 TABLET | Refills: 3 | Status: SHIPPED | OUTPATIENT
Start: 2018-08-31 | End: 2019-09-20 | Stop reason: SDUPTHER

## 2018-08-31 RX ORDER — COLESEVELAM HYDROCHLORIDE 3.75 G/1
POWDER, FOR SUSPENSION ORAL DAILY
COMMUNITY
End: 2018-08-31 | Stop reason: SDUPTHER

## 2018-08-31 RX ORDER — FUROSEMIDE 40 MG/1
40 TABLET ORAL DAILY
Qty: 90 TABLET | Refills: 3 | Status: SHIPPED | OUTPATIENT
Start: 2018-08-31 | End: 2019-09-20 | Stop reason: SDUPTHER

## 2018-08-31 RX ORDER — COLESEVELAM HYDROCHLORIDE 3.75 G/1
3.75 POWDER, FOR SUSPENSION ORAL 2 TIMES DAILY
Qty: 30 EACH
Start: 2018-08-31 | End: 2021-11-01

## 2018-08-31 RX ORDER — POTASSIUM CHLORIDE 750 MG/1
20 CAPSULE, EXTENDED RELEASE ORAL DAILY
Qty: 180 CAPSULE | Refills: 3 | Status: SHIPPED | OUTPATIENT
Start: 2018-08-31 | End: 2019-11-01 | Stop reason: SDUPTHER

## 2018-09-11 ENCOUNTER — TELEPHONE (OUTPATIENT)
Dept: CARDIAC REHAB | Facility: HOSPITAL | Age: 53
End: 2018-09-11

## 2018-09-11 NOTE — TELEPHONE ENCOUNTER
I called Angeles Oquendo on 9/4/18, she stated she was driving and would call back.  Today I attempted to contact her about Cardiac Rehab phase II.  I left a message with my name and contact information if she is interested in starting Cardiac Rehab.

## 2018-09-12 ENCOUNTER — TELEPHONE (OUTPATIENT)
Dept: CARDIAC REHAB | Facility: HOSPITAL | Age: 53
End: 2018-09-12

## 2018-09-24 ENCOUNTER — OFFICE VISIT (OUTPATIENT)
Dept: CARDIAC REHAB | Facility: HOSPITAL | Age: 53
End: 2018-09-24

## 2018-09-24 VITALS — WEIGHT: 207 LBS | HEIGHT: 65 IN | BODY MASS INDEX: 34.49 KG/M2

## 2018-09-24 DIAGNOSIS — Z98.890 S/P MVR (MITRAL VALVE REPAIR): Primary | ICD-10-CM

## 2018-09-24 DIAGNOSIS — Z95.1 STATUS POST FOUR VESSEL CORONARY ARTERY BYPASS: ICD-10-CM

## 2018-09-24 PROCEDURE — 93797 PHYS/QHP OP CAR RHAB WO ECG: CPT

## 2018-09-24 NOTE — PROGRESS NOTES
Cardiac Rehab Initial Assessment      Name: Angeles Oquendo  :1965 Allergies:Statins and Sulfa antibiotics   MRN: [id number] 53 y.o. Physician: Charlotte Johnson MD   Primary Diagnosis:    Diagnosis Plan   1. S/P MVR (mitral valve repair)     2. Status post four vessel coronary artery bypass      Event Date: 18   Specialist: Dr. James   Secondary Diagnosis: Risk Stratification:Moderate Risk Note Author: Brenna Rodriguez RN     Cardiovascular History: Previous MI per information given to the patient.  Had a stress test done  due to an abnormal EKG which was done in May.       EXERCISE AT HOME  no  na  N/A    EF: 45%      Source: Echo 18          Ambulatory Status:Independent can walk about 1/4 mile beyond might use a cane and a back brace.   Ambulatory Fall Risk Assessed on Initial Visit: yes 6 Minute Walk Pre- Cardiac Rehab:  Distance:693ft      RPE:5  Max. HR: 81       SPO2:94    MET: 2  MPH: 1.3             RPD: 4  Resting BP: 120/64 LA, 114/68 RA    Peak BP: 138/68  Recovery BP: 112/68  Comments: Left hip painful at the end of the walk.  No other complaints.       NUTRITION  Lipids:yes If yes, labs as follows;  Total: No components found for: CHOLESTEROL  HDL:   HDL Cholesterol   Date Value Ref Range Status   2018 46 40 - 60 mg/dL Final    Lipids continued:  LDL:  LDL Cholesterol    Date Value Ref Range Status   2018 99 0 - 100 mg/dL Final     Triglyceride: No components found for: TRIGLYCERIDE   Weight Management:                 Weight: 207  Height: 65                                   BMI: Body mass index is 34.45 kg/m².  Waist Circumference: 47  inches   Alcohol Use: defer Diabetes:Yes,  Monitors BS at home- yes, Frequency: continuous monitor system Dexcom 6   It alarms if abnormal , Random BS: 104 fasting    Last HGBA1C with date if applicable:No components found for: A1C         SOCIAL HISTORY  Social History     Social History   • Marital status:       Social History Main Topics   • Smoking status: Former Smoker     Packs/day: 1.00     Years: 30.00     Types: Cigarettes     Quit date: 3/28/2018   • Smokeless tobacco: Never Used      Comment: caffeine use   • Alcohol use No   • Drug use: No     Other Topics Concern   • Not on file       Educational Level (choose one that applies) post college graduate work or degree Learning Barriers:Ready to Learn    Family Support:yes    Living Arrangement: lives with their family 2 children high school age    Risk Factors: Stress  Yes, Clinical Depression  Yes, Heredity  No If Yes na, Hyperlipidemia  No, Diabetes  Yes If Yes: Do you check blood glucose daily  Yes Today's glucose level 104, Exercise prior to event  No If Yes: Activity none, Minutes per daynone, Days per week none and Obesity  Yes     Tobacco Adjunct: No  Yes quit Feb. 25 2018 smoked for 30 years 1 ppd      Comorbidities: Connective tissue disease, Pulmonary disease, Ulcer disease, Diabetes Mellitus and Previous MI   Rheumatoid arthritis   Asthma  Stomach ulcers first episode at 27 and last was at 50 years lower GI tract  Hypothyroid  Will have a sleep  Apnea test done which has not yet been scheduled.  Used CPAP [id number].     PSYCHOSOCIAL  Clinical Depression: yes    Stress: yes     Assess presence or absence of depression using a valid screening tool: yes      PHYSICAL ASSESSMENT  Influenza vaccine: yes  Pneumococcal vaccine: yes          Angina: no    Describe angina scale of 0 - 4: 0 = none    Today are you having incisional pain? Yes. If, Yes, Scale: 3 mild to moderate tenderness and site itches.  Midline incision healing.  Right leg GSV harvest site well-healed         Today are you having any other pain? Yes. If, Yes, Scale: 5  Arthritis and back from broken coccyx and has disc bulging at neck and lower back.       Diagnosed with Hypertension:yes at age 27    Heart Sounds: no murmur regular rhythm    Lung Sounds: normal air entry, lungs clear to  auscultation diminished breath sounds lower lobes bilaterally         Assessment: Has been off her rheumatoid arthritis medicine for 3 monthes.  Orthopedic Problems: fingers, shoulders, elbows knees feet and toe pain on both sides due to rheumatoid arthritis    Are you being hurt, hit, or frightened by anyone at home or in your life? no    Are you being neglected by a caregiver? NA Shoulder flexibility/Range of motion: Below average     Recommended arm activity: Any willing to try but has discomfort with extension.     Chair sit and reach within: 12 inches   Leg flexibility: Below average    Leg Strength/Balance/Five times sit to stand: 14 seconds.   Had difficulty performing due to the arthritis and the weather  Chose one: Balance Dysfunction    Recommended stretching: seated    Assessment: limited flexibility and balance    Family attends IA: no Time of arrival: 1330  Time of departure: 1445     Patient Goals: To be able walk longer distances to go to her children's band competitions.  To be able to increase stamina.  To be able to sleep better.           9/24/2018  2:59 PM  Brenna Rodriguez RN

## 2018-10-03 ENCOUNTER — TREATMENT (OUTPATIENT)
Dept: CARDIAC REHAB | Facility: HOSPITAL | Age: 53
End: 2018-10-03

## 2018-10-03 DIAGNOSIS — Z98.890 S/P MVR (MITRAL VALVE REPAIR): Primary | ICD-10-CM

## 2018-10-03 PROCEDURE — 93798 PHYS/QHP OP CAR RHAB W/ECG: CPT

## 2018-10-08 ENCOUNTER — TREATMENT (OUTPATIENT)
Dept: CARDIAC REHAB | Facility: HOSPITAL | Age: 53
End: 2018-10-08

## 2018-10-08 DIAGNOSIS — I25.119 CORONARY ARTERY DISEASE INVOLVING NATIVE CORONARY ARTERY OF NATIVE HEART WITH ANGINA PECTORIS (HCC): Primary | ICD-10-CM

## 2018-10-08 PROCEDURE — 93798 PHYS/QHP OP CAR RHAB W/ECG: CPT

## 2018-10-10 ENCOUNTER — TREATMENT (OUTPATIENT)
Dept: CARDIAC REHAB | Facility: HOSPITAL | Age: 53
End: 2018-10-10

## 2018-10-10 DIAGNOSIS — Z98.890 S/P MVR (MITRAL VALVE REPAIR): ICD-10-CM

## 2018-10-10 DIAGNOSIS — Z95.1 STATUS POST FOUR VESSEL CORONARY ARTERY BYPASS: ICD-10-CM

## 2018-10-10 DIAGNOSIS — I25.119 CORONARY ARTERY DISEASE INVOLVING NATIVE CORONARY ARTERY OF NATIVE HEART WITH ANGINA PECTORIS (HCC): Primary | ICD-10-CM

## 2018-10-10 PROCEDURE — 93798 PHYS/QHP OP CAR RHAB W/ECG: CPT

## 2018-10-11 ENCOUNTER — OFFICE VISIT (OUTPATIENT)
Dept: CARDIOLOGY | Facility: CLINIC | Age: 53
End: 2018-10-11

## 2018-10-11 VITALS
RESPIRATION RATE: 24 BRPM | SYSTOLIC BLOOD PRESSURE: 118 MMHG | BODY MASS INDEX: 34.82 KG/M2 | OXYGEN SATURATION: 96 % | DIASTOLIC BLOOD PRESSURE: 78 MMHG | WEIGHT: 209 LBS | HEIGHT: 65 IN | HEART RATE: 80 BPM

## 2018-10-11 DIAGNOSIS — Z98.890 STATUS POST MITRAL VALVE REPAIR: ICD-10-CM

## 2018-10-11 DIAGNOSIS — I25.5 ISCHEMIC CARDIOMYOPATHY: ICD-10-CM

## 2018-10-11 DIAGNOSIS — I05.1 RHEUMATIC MITRAL REGURGITATION: ICD-10-CM

## 2018-10-11 DIAGNOSIS — E10.59 TYPE 1 DIABETES MELLITUS WITH OTHER CIRCULATORY COMPLICATION (HCC): ICD-10-CM

## 2018-10-11 DIAGNOSIS — I25.10 CORONARY ARTERY DISEASE INVOLVING NATIVE CORONARY ARTERY OF NATIVE HEART WITHOUT ANGINA PECTORIS: Primary | ICD-10-CM

## 2018-10-11 PROCEDURE — 99214 OFFICE O/P EST MOD 30 MIN: CPT | Performed by: INTERNAL MEDICINE

## 2018-10-11 PROCEDURE — 93000 ELECTROCARDIOGRAM COMPLETE: CPT | Performed by: INTERNAL MEDICINE

## 2018-10-11 RX ORDER — GOLIMUMAB 50 MG/.5ML
INJECTION, SOLUTION SUBCUTANEOUS
COMMUNITY
Start: 2018-10-09 | End: 2021-11-01

## 2018-10-24 ENCOUNTER — TELEPHONE (OUTPATIENT)
Dept: CARDIOLOGY | Facility: CLINIC | Age: 53
End: 2018-10-24

## 2018-10-26 RX ORDER — COLESEVELAM 180 1/1
TABLET ORAL
Qty: 180 TABLET | Refills: 0 | OUTPATIENT
Start: 2018-10-26

## 2018-10-28 DIAGNOSIS — I34.0 MITRAL VALVE INSUFFICIENCY, UNSPECIFIED ETIOLOGY: ICD-10-CM

## 2018-10-28 NOTE — PROGRESS NOTES
Date of Office Visit: 10/11/2018  Encounter Provider: Jesus James MD  Place of Service: [facility] CARDIOLOGY  Patient Name: Angeles Oquendo  :1965    Chief complaint: Follow-up for coronary artery disease, mitral valve repair, and   ischemic cardiomyopathy.    History of Present Illness:    I again had the pleasure of seeing the patient in cardiology office on 10/11/2018.  She is   a very pleasant 53 year-old white female with a history of rheumatic mitral disease,   complex mitral valve repair, coronary artery disease, ischemic cardiomyopathy, type 1   diabetes on an insulin pump, and rheumatoid arthritis who presents for follow-up.  I   initially saw the patient on 2018.  She was scheduled for hysterectomy, and had an   abnormal preoperative EKG showing a possible old inferior infarct. She was still smoking   one pack of cigarettes per day at that time as well, although she was not having any   symptoms.  An echocardiogram and Lexiscan Myoview stress test were performed on   2018.  The echocardiogram showed severe inferior and inferoseptal wall motion   abnormalities, with an ejection fraction of 40-45%. She also had a rheumatic mitral valve   with severe mitral regurgitation and at least moderate mitral stenosis.  Her nuclear   stress test confirmed a large infarct in the inferior wall.  She underwent a left heart   catheterization on 2018 which showed severe coronary artery disease, including   75-80% mid LAD disease, 50% mid left circumflex disease, and an occluded RCA with   recanalization and collaterals from the left system.  She ultimately underwent a 4 vessel   CABG (LIMA-LAD, SVG-D2, and sequential SVG-PDA and PLV) and complex mitral   valve repair by Dr. Tobias on 2018.  The mitral valve repair consists of a 26 mm   Physio II ring annuloplasty and posteromedial commissuroplasty.  She did fairly well   postoperatively.    The patient  presents today for follow-up.  Unfortunately, she is having severe issues   with her rheumatoid arthritis, and she has been having significant pain.  She did just   start injections for the rheumatoid arthritis yesterday.  She has been in cardiac rehab,   and has not had any issues thus far.  She has still abstained from smoking.  She   denied any chest pain or shortness of breath.  She has largely recovered from the   operation.    Past Medical History:   Diagnosis Date   • Anesthesia complication     WITH LAURO TOOK A  LOT TO GET HER TO SLEEP   • Asthma    • Bipolar I disorder, single manic episode (CMS/HCC)    • Bursitis     LEFT ELBOW   • Cardiomyopathy (CMS/HCC)    • Chronic pain syndrome     SEES DR GODWIN NOLASCO FOR PAIN   • Coronary artery disease     4 vessel CABG 7/19/18 by Dr. Tobias (WEISS-LAD, sequential SVG-PDA and PLV, SVG-D2)   • Diabetes mellitus (CMS/HCC)     TYPE 2. HAS INSULIN PUMP WITH CONT BASAL RATE   • Disc degeneration, lumbar    • Esophageal reflux    • Fibromyalgia    • H/O seasonal allergies    • History of repair of mitral valve     26 mm Physio II ring annuloplasty and posteromedial commisuroplasty by Dr. Tobias on 7/19/18   • History of sleep apnea     2003 SLEEP STUDY. HAD MACHINE THEN. USED 3YRS. SLEEP STUDY REPEATED. WAS OKED NOT TO USE MACHING ANY MORE   • Hyperlipidemia    • Hypertension    • Hypothyroidism    • Mitral regurgitation     Severe rheumatic mitral regurgitation prior to mitral valve repair.   • CARLTON (obstructive sleep apnea)     not on CPAP   • PCOS (polycystic ovarian syndrome)    • Rheumatoid arthritis (CMS/HCC)    • SOB (shortness of breath)    • Statin intolerance     Severe myalgias    • Vitamin D deficiency        Past Surgical History:   Procedure Laterality Date   • CARDIAC CATHETERIZATION N/A 6/29/2018    Procedure: Right and Left Heart Cath and cors;  Surgeon: Stalin Handy MD;  Location: [facility] CATH INVASIVE LOCATION;  Service: Cardiovascular   • CARDIAC  "CATHETERIZATION N/A 2018    Procedure: Coronary angiography;  Surgeon: Stalin Handy MD;  Location: [facility] CATH INVASIVE LOCATION;  Service: Cardiovascular   • CARDIAC CATHETERIZATION N/A 2018    Procedure: Left ventriculography;  Surgeon: Stalin Handy MD;  Location:  [facility] CATH INVASIVE LOCATION;  Service: Cardiovascular   •  SECTION      X2   • CORONARY ARTERY BYPASS GRAFT     • CORONARY ARTERY BYPASS GRAFT WITH MITRAL VALVE REPAIR/REPLACEMENT N/A 2018    Procedure: LAURO STERNOTOMY CORONARY ARTERY BYPASS GRAFT TIMES 4 USING LEFT INTERNAL MAMMARY ARTERY AND RIGHT GREATER SAPHENOUS VEIN GRAFT PER ENDOSCOPIC VEIN HARVESTING, MITRAL VALVE REPAIR AND PRP;  Surgeon: Kevin Tobias MD;  Location: [facility] MAIN OR;  Service: Cardiothoracic   • ENDOMETRIAL ABLATION      X2   • MITRAL VALVE REPAIR/REPLACEMENT     • TONSILLECTOMY     • UTERINE FIBROID SURGERY      Ablasion       Current Outpatient Prescriptions on File Prior to Visit   Medication Sig Dispense Refill   • aspirin 81 MG EC tablet Take 1 tablet by mouth Daily. 100 tablet 99   • buPROPion XL (WELLBUTRIN XL) 150 MG 24 hr tablet Take 450 mg by mouth Daily.     • carvedilol (COREG) 6.25 MG tablet Take 1 tablet by mouth Every 12 (Twelve) Hours. 180 tablet 3   • clonazePAM (KlonoPIN) 1 MG tablet Take 1 mg by mouth 4 (Four) Times a Day As Needed for Anxiety.     • colesevelam (WELCHOL) 3.75 g pack pack Take 1 packet by mouth 2 (Two) Times a Day. (Patient taking differently: Take 3.75 g by mouth 2 (Two) Times a Day. PT STATES IS NOT A \"PACK\" ITS 3 PILLS BID) 30 each    • fexofenadine (ALLEGRA) 180 MG tablet Take 180 mg by mouth Daily.     • furosemide (LASIX) 40 MG tablet Take 1 tablet by mouth Daily. 90 tablet 3   • gabapentin (NEURONTIN) 600 MG tablet Take 600 mg by mouth 3 (Three) Times a Day.     • HYDROcodone-acetaminophen (NORCO)  MG per tablet Take 1 tablet by mouth Every 6 (Six) Hours As Needed for Moderate Pain .     • " insulin lispro (HumaLOG) 100 UNIT/ML injection Inject  under the skin. 2 UNITS ON 4 CARBS BASAL RATE OF 3 PER HOUR. VIA INSULIN PUMP     • levothyroxine (SYNTHROID, LEVOTHROID) 175 MCG tablet Take 175 mcg by mouth Daily.     • methocarbamol (ROBAXIN) 750 MG tablet Take 750 mg by mouth 2 (Two) Times a Day As Needed.     • Misc. Devices (SYMPHONY DOUBLE PUMPING SYSTEM) misc Every 30 (Thirty) Days.     • mometasone-formoterol (DULERA 100) 100-5 MCG/ACT inhaler Inhale 2 puffs 2 (Two) Times a Day.     • montelukast (SINGULAIR) 10 MG tablet Take 10 mg by mouth Every Night.     • omeprazole (priLOSEC) 40 MG capsule Take 40 mg by mouth Daily As Needed.     • potassium chloride (MICRO-K) 10 MEQ CR capsule Take 2 capsules by mouth Daily. 180 capsule 3   • QUEtiapine (SEROquel) 300 MG tablet Take 200 mg by mouth Every Night.     • topiramate (TOPAMAX) 100 MG tablet Take 300 mg by mouth Every Night.     • Vortioxetine HBr (TRINTELLIX) 5 MG tablet Take 5 mg by mouth Daily With Breakfast.     • zolpidem (AMBIEN) 10 MG tablet Take 10 mg by mouth At Night As Needed for Sleep.       No current facility-administered medications on file prior to visit.      Allergies as of 10/11/2018 - Reviewed 10/11/2018   Allergen Reaction Noted   • Statins Myalgia 05/25/2018   • Sulfa antibiotics Rash 12/14/2015     Social History     Social History   • Marital status:      Spouse name: N/A   • Number of children: N/A   • Years of education: N/A     Occupational History   • Not on file.     Social History Main Topics   • Smoking status: Former Smoker     Packs/day: 1.00     Years: 30.00     Types: Cigarettes   • Smokeless tobacco: Never Used      Comment: Quit 6/2018   • Alcohol use No   • Drug use: No   • Sexual activity: Not on file     Other Topics Concern   • Not on file     Social History Narrative   • No narrative on file     Family History   Problem Relation Age of Onset   • Anemia Other    • Arthritis Other    • Asthma Other    •  "Depression Other         with anxiety   • Diabetes Other    • Hypertension Other    • Allergies Other    • Heart disease Other    • Diabetes Father    • Hypertension Father    • Stroke Paternal Grandmother    • Heart disease Paternal Grandmother    • Hypertension Paternal Grandmother    • Heart attack Paternal Grandfather    • Diabetes Paternal Grandfather    • Heart disease Paternal Grandfather    • Hypertension Paternal Grandfather    • Hypertension Mother    • Heart disease Maternal Grandmother    • Hypertension Maternal Grandmother    • Heart disease Maternal Grandfather    • Hypertension Maternal Grandfather    • Malig Hyperthermia Neg Hx        Review of Systems   Constitution: Positive for malaise/fatigue.   Respiratory: Positive for snoring.    Musculoskeletal: Positive for joint pain and joint swelling.   Neurological: Positive for headaches.   All other systems reviewed and are negative.     Objective:     Vitals:    10/11/18 1324   BP: 118/78   Pulse: 80   Resp: 24   SpO2: 96%   Weight: 94.8 kg (209 lb)   Height: 165.1 cm (65\")     Body mass index is 34.78 kg/m².    Physical Exam   Constitutional: She is oriented to person, place, and time. She appears well-developed and well-nourished.   HENT:   Head: Normocephalic and atraumatic.   Eyes: Conjunctivae are normal.   Neck: Neck supple.   Cardiovascular: Normal rate and regular rhythm.  Exam reveals no gallop and no friction rub.    No murmur heard.  Pulmonary/Chest: Effort normal and breath sounds normal.   Abdominal: Soft. There is no tenderness.   Musculoskeletal: She exhibits no edema.   Neurological: She is alert and oriented to person, place, and time.   Skin: Skin is warm.   Psychiatric: She has a normal mood and affect. Her behavior is normal.     Lab Review:     ECG 12 Lead  Date/Time: 10/11/2018 1:38 PM  Performed by: NATHANIEL BRISENO  Authorized by: NATHANIEL BRISENO   Comparison: compared with previous ECG from 7/19/2018  Similar to " previous ECG  Rhythm: sinus rhythm  Rate: normal  BPM: 75  Other findings: LAE  Clinical impression: abnormal ECG  Comments: Inferior infarct, age undetermined  Prolonged QTc interval           Cardiac Procedures:  1. Left heart catheterization on 6/29/2018: The left main was normal.  The LAD had   75-80% mid disease.  There was a large diagonal with 20% proximal disease and   30-40% distal disease.  The left circumflex had 50% mid disease.  The RCA was   occluded with recanalization and collaterals from the left.  2. Status post four-vessel CABG (LIMA-LAD, SVG-D2, and sequential SVG-PDA and   PLV) and complex mitral valve repair by Dr. Tobias on 7/19/2018.  The mitral valve repair   consists of a 26 mm Physio II ring annuloplasty and posteromedial commissuroplasty.  3. Echocardiogram on 7/23/2018: There was moderate hypokinesis of the basal to   mid inferior wall and severe hypokinesis of the basal to mid inferoseptum.  The ejection  fraction was 45%.  The left atrium was moderately dilated.  There was mild aortic   insufficiency.  The mitral valve repair appeared to be intact with elevated gradients   (peak gradient 17 mmHg and mean gradient 8 mmHg).  There was trace mitral   regurgitation.  There was mild tricuspid regurgitation.  The capillary RVSP was 40   mmHg.    Assessment:       Diagnosis Plan   1. Coronary artery disease involving native coronary artery of native heart without angina pectoris  ECG 12 Lead   2. Status post mitral valve repair     3. Rheumatic mitral regurgitation     4. Ischemic cardiomyopathy     5. Type 1 diabetes mellitus with other circulatory complication (CMS/[facility])       Plan:       Again, the patient seems to have recovered well from the surgery.  Her main issue recently   has been severe pain from her rheumatoid arthritis.  She has not had any chest pain or   shortness of breath.  Her postoperative echocardiogram on 7/23/2018 showed an ejection   fraction of 45%, elevated gradients  across her mitral valve complex repair, and only trace   mitral regurgitation.  I suspect that the gradients will improve over time, and this can be   reassessed on another echocardiogram in the future. It appears that she very likely   infarcted portions of her inferior wall and inferoseptum.  I suspect that this will not recover,   even with revascularization given the appearance on the nuclear stress test and Q waves   on the EKG.  However, she should be treated appropriately for her cardiomyopathy.  She   has no signs of heart failure.  She will continue on the Coreg at 6.25 mg every 12 hours.    She is not on an ACE inhibitor as she did have relative hypotension postoperatively.  She   is euvolemic on the Lasix at 40 mg per day.  She will continue on the aspirin for the   coronary artery disease.  She does have a significant intolerance to statins, which is likely   secondary to her rheumatoid arthritis as well.  After her lipid panel was checked in the   future again, if she qualifies, I would consider starting her on Repatha.  She will continue in   cardiac rehab.  She still is abstaining from smoking.  For now, I will plan on seeing her   back in the office in 4 months unless other issues arise.    Coronary Artery Disease  Assessment  • The patient has no angina    Plan  • Lifestyle modifications discussed include adhering to a heart healthy diet, avoidance of tobacco products, maintenance of a healthy weight, medication compliance, regular exercise and regular monitoring of cholesterol and blood pressure    Subjective - Objective  • There is a history of previous coronary artery bypass graft on or around 7/19/2018  • Current antiplatelet therapy includes aspirin 81 mg  • The patient qualifies for cardiac rehabilitation, and is already participating in a cardiac rehab program

## 2018-10-29 RX ORDER — POTASSIUM CHLORIDE 750 MG/1
20 CAPSULE, EXTENDED RELEASE ORAL DAILY
Qty: 60 CAPSULE | Refills: 0 | OUTPATIENT
Start: 2018-10-29

## 2018-11-28 ENCOUNTER — APPOINTMENT (OUTPATIENT)
Dept: CARDIAC REHAB | Facility: HOSPITAL | Age: 53
End: 2018-11-28

## 2018-11-30 ENCOUNTER — APPOINTMENT (OUTPATIENT)
Dept: CARDIAC REHAB | Facility: HOSPITAL | Age: 53
End: 2018-11-30

## 2018-12-03 ENCOUNTER — APPOINTMENT (OUTPATIENT)
Dept: CARDIAC REHAB | Facility: HOSPITAL | Age: 53
End: 2018-12-03

## 2018-12-05 ENCOUNTER — APPOINTMENT (OUTPATIENT)
Dept: CARDIAC REHAB | Facility: HOSPITAL | Age: 53
End: 2018-12-05

## 2018-12-07 ENCOUNTER — APPOINTMENT (OUTPATIENT)
Dept: CARDIAC REHAB | Facility: HOSPITAL | Age: 53
End: 2018-12-07

## 2018-12-10 ENCOUNTER — APPOINTMENT (OUTPATIENT)
Dept: CARDIAC REHAB | Facility: HOSPITAL | Age: 53
End: 2018-12-10

## 2018-12-12 ENCOUNTER — APPOINTMENT (OUTPATIENT)
Dept: CARDIAC REHAB | Facility: HOSPITAL | Age: 53
End: 2018-12-12

## 2018-12-14 ENCOUNTER — APPOINTMENT (OUTPATIENT)
Dept: CARDIAC REHAB | Facility: HOSPITAL | Age: 53
End: 2018-12-14

## 2018-12-17 ENCOUNTER — APPOINTMENT (OUTPATIENT)
Dept: CARDIAC REHAB | Facility: HOSPITAL | Age: 53
End: 2018-12-17

## 2018-12-19 ENCOUNTER — APPOINTMENT (OUTPATIENT)
Dept: CARDIAC REHAB | Facility: HOSPITAL | Age: 53
End: 2018-12-19

## 2018-12-21 ENCOUNTER — APPOINTMENT (OUTPATIENT)
Dept: CARDIAC REHAB | Facility: HOSPITAL | Age: 53
End: 2018-12-21

## 2018-12-24 ENCOUNTER — APPOINTMENT (OUTPATIENT)
Dept: CARDIAC REHAB | Facility: HOSPITAL | Age: 53
End: 2018-12-24

## 2018-12-26 ENCOUNTER — APPOINTMENT (OUTPATIENT)
Dept: CARDIAC REHAB | Facility: HOSPITAL | Age: 53
End: 2018-12-26

## 2018-12-28 ENCOUNTER — APPOINTMENT (OUTPATIENT)
Dept: CARDIAC REHAB | Facility: HOSPITAL | Age: 53
End: 2018-12-28

## 2018-12-31 ENCOUNTER — APPOINTMENT (OUTPATIENT)
Dept: CARDIAC REHAB | Facility: HOSPITAL | Age: 53
End: 2018-12-31

## 2019-02-22 ENCOUNTER — OFFICE VISIT (OUTPATIENT)
Dept: CARDIOLOGY | Facility: CLINIC | Age: 54
End: 2019-02-22

## 2019-02-22 VITALS
WEIGHT: 216 LBS | HEIGHT: 65 IN | BODY MASS INDEX: 35.99 KG/M2 | OXYGEN SATURATION: 98 % | HEART RATE: 72 BPM | SYSTOLIC BLOOD PRESSURE: 124 MMHG | DIASTOLIC BLOOD PRESSURE: 78 MMHG

## 2019-02-22 DIAGNOSIS — I10 ESSENTIAL HYPERTENSION: ICD-10-CM

## 2019-02-22 DIAGNOSIS — I25.10 CORONARY ARTERY DISEASE INVOLVING NATIVE HEART WITHOUT ANGINA PECTORIS, UNSPECIFIED VESSEL OR LESION TYPE: Primary | ICD-10-CM

## 2019-02-22 DIAGNOSIS — Z98.890 H/O MITRAL VALVE REPAIR: ICD-10-CM

## 2019-02-22 DIAGNOSIS — G47.33 OSA (OBSTRUCTIVE SLEEP APNEA): ICD-10-CM

## 2019-02-22 DIAGNOSIS — E78.41 ELEVATED LIPOPROTEIN(A): ICD-10-CM

## 2019-02-22 DIAGNOSIS — I34.0 SEVERE MITRAL REGURGITATION: ICD-10-CM

## 2019-02-22 PROBLEM — I25.119 CORONARY ARTERY DISEASE INVOLVING NATIVE CORONARY ARTERY OF NATIVE HEART WITH ANGINA PECTORIS (HCC): Status: RESOLVED | Noted: 2018-06-30 | Resolved: 2019-02-22

## 2019-02-22 PROBLEM — E78.5 HYPERLIPIDEMIA: Status: ACTIVE | Noted: 2019-02-22

## 2019-02-22 PROCEDURE — 99214 OFFICE O/P EST MOD 30 MIN: CPT | Performed by: NURSE PRACTITIONER

## 2019-02-22 NOTE — PROGRESS NOTES
[facility]      Patient Name: Angeles Oquendo  :1965  Age: 53 y.o.  Primary Cardiologist: Chino James MD  Encounter Provider:  KALYANI Qiu      Chief Complaint:   Chief Complaint   Patient presents with   • Follow-up     4 month         HPI  Angeles Oquendo is a 53 y.o. female with a history significant for coronary artery disease, severe mitral valve regurgitation, hypertension, hyperlipidemia, asthma, obstructive sleep apnea, PCOS, diabetes, hypothyroidism, fibromyalgia, bipolar, statin intolerance, history of mitral valve repair.  Patient presents to day for 4-month follow-up.  Patient is new to me but I reviewed prior medical records.  Patient states that from a cardiovascular standpoint she is doing well over the past 4 months.  She states that she has had some issues with sinus infections and thus had to stop her injections for her rheumatoid arthritis secondary to migraine headaches.  Now as a result of that she is having rheumatoid arthritis flares.  Patient reports some shortness of breath related to sinus infection and nasal congestion.  She also reports some generalized fatigue from this as well.  She denies any chest pain, palpitations, lightheadedness, swelling.  Reports that Dr. Flowers checked her lipids in October and states that they were controlled.  She is requesting refills of carvedilol and WelChol today.      The following portions of the patient's history were reviewed and updated as appropriate: allergies, current medications, past family history, past medical history, past social history, past surgical history and problem list.    Current Outpatient Medications on File Prior to Visit   Medication Sig   • aspirin 81 MG EC tablet Take 1 tablet by mouth Daily.   • buPROPion XL (WELLBUTRIN XL) 150 MG 24 hr tablet Take 450 mg by mouth Daily.   • carvedilol (COREG) 6.25 MG tablet Take 1 tablet by mouth Every 12 (Twelve) Hours.   • clonazePAM  "(KlonoPIN) 1 MG tablet Take 1 mg by mouth 4 (Four) Times a Day As Needed for Anxiety.   • colesevelam (WELCHOL) 3.75 g pack pack Take 1 packet by mouth 2 (Two) Times a Day. (Patient taking differently: Take 3.75 g by mouth 2 (Two) Times a Day. PT STATES IS NOT A \"PACK\" ITS 3 PILLS BID)   • Ergocalciferol (VITAMIN D2 PO) Take 1 capsule by mouth.   • fexofenadine (ALLEGRA) 180 MG tablet Take 180 mg by mouth Daily.   • furosemide (LASIX) 40 MG tablet Take 1 tablet by mouth Daily.   • gabapentin (NEURONTIN) 600 MG tablet Take 600 mg by mouth 3 (Three) Times a Day.   • HYDROcodone-acetaminophen (NORCO)  MG per tablet Take 1 tablet by mouth Every 6 (Six) Hours As Needed for Moderate Pain .   • insulin lispro (HumaLOG) 100 UNIT/ML injection Inject  under the skin. 2 UNITS ON 4 CARBS BASAL RATE OF 3 PER HOUR. VIA INSULIN PUMP   • levothyroxine (SYNTHROID, LEVOTHROID) 175 MCG tablet Take 175 mcg by mouth Daily.   • methocarbamol (ROBAXIN) 750 MG tablet Take 750 mg by mouth 2 (Two) Times a Day As Needed.   • Misc. Devices (SYMPHONY DOUBLE PUMPING SYSTEM) misc Every 30 (Thirty) Days.   • mometasone-formoterol (DULERA 100) 100-5 MCG/ACT inhaler Inhale 2 puffs 2 (Two) Times a Day.   • montelukast (SINGULAIR) 10 MG tablet Take 10 mg by mouth Every Night.   • omeprazole (priLOSEC) 40 MG capsule Take 40 mg by mouth Daily As Needed.   • potassium chloride (MICRO-K) 10 MEQ CR capsule Take 2 capsules by mouth Daily.   • QUEtiapine (SEROquel) 300 MG tablet Take 200 mg by mouth Every Night.   • SIMPONI 50 MG/0.5ML solution auto-injector    • topiramate (TOPAMAX) 100 MG tablet Take 300 mg by mouth Every Night.   • Vortioxetine HBr (TRINTELLIX) 5 MG tablet Take 5 mg by mouth Daily With Breakfast.   • zolpidem (AMBIEN) 10 MG tablet Take 10 mg by mouth At Night As Needed for Sleep.     No current facility-administered medications on file prior to visit.          Review of Systems   Constitution: Positive for malaise/fatigue and weight " "gain.   HENT: Positive for ear pain and sore throat.    Cardiovascular: Negative for chest pain and leg swelling.   Respiratory: Positive for cough and snoring. Negative for shortness of breath.    Skin: Positive for itching and rash.   Musculoskeletal: Positive for joint pain, joint swelling and myalgias.   Gastrointestinal: Positive for diarrhea, nausea and vomiting.   Neurological: Positive for headaches. Negative for light-headedness.   Psychiatric/Behavioral: The patient is nervous/anxious.    All other systems reviewed and are negative.      OBJECTIVE:   Vital Signs  Vitals:    02/22/19 1407   BP: 124/78   Pulse: 72   SpO2: 98%     Estimated body mass index is 35.94 kg/m² as calculated from the following:    Height as of this encounter: 165.1 cm (65\").    Weight as of this encounter: 98 kg (216 lb).    Physical Exam   Constitutional: She is oriented to person, place, and time. Vital signs are normal. She appears well-developed and well-nourished. She is active.   Eyes: Conjunctivae are normal.   Neck: Carotid bruit is not present.   Cardiovascular: Normal rate, regular rhythm and normal heart sounds.   Pulmonary/Chest: Breath sounds normal.   Abdominal: Normal appearance.   Musculoskeletal:   No cyanosis, clubbing, edema  Normal ROM   Neurological: She is alert and oriented to person, place, and time. GCS eye subscore is 4. GCS verbal subscore is 5. GCS motor subscore is 6.   Skin: Skin is warm, dry and intact.   Psychiatric: She has a normal mood and affect. Her speech is normal and behavior is normal. Judgment and thought content normal. Cognition and memory are normal.       Procedures    Cardiac Procedures:  1. Left heart catheterization on 6/29/2018: The left main was normal.  The LAD had 75-80% mid disease.  There was a large diagonal with 20% proximal disease and 30-40% distal disease.  The left circumflex had 50% mid disease.  The RCA was occluded with recanalization and collaterals from the " left.  2. Status post four-vessel CABG (LIMA-LAD, SVG-D2, and sequential SVG-PDA and PLV) and complex mitral valve repair by Dr. Tobias on 7/19/2018.  The mitral valve repair consists of a 26 mm Physio II ring annuloplasty and posteromedial commissuroplasty.  3. Echocardiogram on 7/23/2018: There was moderate hypokinesis of the basal to mid inferior wall and severe hypokinesis of the basal to mid inferoseptum.  The ejection fraction was 45%.  The left atrium was moderately dilated.  There was mild aortic insufficiency.  The mitral valve repair appeared to be intact with elevated gradients (peak gradient 17 mmHg and mean gradient 8 mmHg).  There was trace mitral regurgitation.  There was mild tricuspid regurgitation.  The capillary RVSP was 40 mmHg.      ASSESSMENT:      Diagnosis Plan   1. Coronary artery disease involving native heart without angina pectoris, unspecified vessel or lesion type     2. Severe mitral regurgitation  Adult Transthoracic Echo Complete W/ Cont if Necessary Per Protocol   3. Essential hypertension     4. Elevated lipoprotein(a)     5. CARLTON (obstructive sleep apnea)     6. H/O mitral valve repair  Adult Transthoracic Echo Complete W/ Cont if Necessary Per Protocol         PLAN OF CARE:     1. History of coronary artery disease.  Patient states that she has not had any further episodes of angina.  She is taking aspirin, carvedilol, WelChol for secondary prevention.  2. Mitral valve regurgitation status post mitral valve repair.  Patient has not had any episodes of shortness of breath other than what was associated with her sinus infection.  Her echocardiogram was checked July 2018.  She will need routine surveillance echocardiogram in 6 months.  3. Hypertension.  Patient's blood pressure in office 124/78.  She states that it has been stabilized at home.  She will continue with carvedilol 6.25 mg twice daily, Lasix 40 mg daily.  Will refill carvedilol today.  4. Hyperlipidemia.  Lipids were  controlled at last check with Dr. Johnson in October.  Will refill patient's WelChol.  5. Obstructive sleep apnea.  Compliant with CPAP.  6. Follow up with Dr. James in 6 months with echocardiogram prior to appointment.      Coronary Artery Disease  Assessment  • The patient has no angina    Plan  • Lifestyle modifications discussed include adhering to a heart healthy diet, avoidance of tobacco products, maintenance of a healthy weight, medication compliance, regular exercise and regular monitoring of cholesterol and blood pressure    Subjective - Objective  • There is a history of previous coronary artery bypass graft on or around 7/19/2018  • Current antiplatelet therapy includes aspirin 81 mg        Thank you for allowing me to participate in the care of your patient,      Sincerely,   KALYANI Qiu  [facility]  02/26/19  1:19 PM    **Steff Disclaimer:**  Much of this encounter note is an electronic transcription/translation of spoken language to printed text. The electronic translation of spoken language may permit erroneous, or at times, nonsensical words or phrases to be inadvertently transcribed. Although I have reviewed the note for such errors, some may still exist.

## 2019-09-20 RX ORDER — CARVEDILOL 6.25 MG/1
TABLET ORAL
Qty: 180 TABLET | Refills: 0 | Status: SHIPPED | OUTPATIENT
Start: 2019-09-20 | End: 2019-12-30

## 2019-09-20 RX ORDER — FUROSEMIDE 40 MG/1
40 TABLET ORAL DAILY
Qty: 90 TABLET | Refills: 0 | Status: SHIPPED | OUTPATIENT
Start: 2019-09-20 | End: 2019-12-30

## 2019-09-26 ENCOUNTER — APPOINTMENT (OUTPATIENT)
Dept: CARDIOLOGY | Facility: HOSPITAL | Age: 54
End: 2019-09-26

## 2019-11-01 DIAGNOSIS — I34.0 MITRAL VALVE INSUFFICIENCY, UNSPECIFIED ETIOLOGY: ICD-10-CM

## 2019-11-01 RX ORDER — POTASSIUM CHLORIDE 750 MG/1
20 CAPSULE, EXTENDED RELEASE ORAL DAILY
Qty: 180 CAPSULE | Refills: 0 | Status: SHIPPED | OUTPATIENT
Start: 2019-11-01 | End: 2021-11-01

## 2019-11-04 RX ORDER — CARVEDILOL 6.25 MG/1
TABLET ORAL
Qty: 60 TABLET | Refills: 0 | Status: SHIPPED | OUTPATIENT
Start: 2019-11-04 | End: 2022-02-15 | Stop reason: SDUPTHER

## 2019-12-30 RX ORDER — FUROSEMIDE 40 MG/1
40 TABLET ORAL DAILY
Qty: 90 TABLET | Refills: 0 | Status: SHIPPED | OUTPATIENT
Start: 2019-12-30 | End: 2020-04-06

## 2019-12-30 RX ORDER — CARVEDILOL 6.25 MG/1
TABLET ORAL
Qty: 180 TABLET | Refills: 0 | Status: SHIPPED | OUTPATIENT
Start: 2019-12-30 | End: 2020-04-06

## 2020-04-06 RX ORDER — FUROSEMIDE 40 MG/1
40 TABLET ORAL DAILY
Qty: 90 TABLET | Refills: 3 | Status: SHIPPED | OUTPATIENT
Start: 2020-04-06 | End: 2021-12-28

## 2020-04-06 RX ORDER — CARVEDILOL 6.25 MG/1
TABLET ORAL
Qty: 180 TABLET | Refills: 3 | Status: SHIPPED | OUTPATIENT
Start: 2020-04-06 | End: 2021-06-21

## 2020-05-12 RX ORDER — CARVEDILOL 6.25 MG/1
TABLET ORAL
Qty: 60 TABLET | Refills: 0 | OUTPATIENT
Start: 2020-05-12

## 2020-09-10 ENCOUNTER — OFFICE VISIT (OUTPATIENT)
Dept: CARDIOLOGY | Facility: CLINIC | Age: 55
End: 2020-09-10

## 2020-09-10 VITALS
BODY MASS INDEX: 37.15 KG/M2 | HEIGHT: 64 IN | WEIGHT: 217.6 LBS | DIASTOLIC BLOOD PRESSURE: 74 MMHG | HEART RATE: 73 BPM | SYSTOLIC BLOOD PRESSURE: 128 MMHG

## 2020-09-10 DIAGNOSIS — I25.10 CORONARY ARTERY DISEASE INVOLVING NATIVE CORONARY ARTERY OF NATIVE HEART WITHOUT ANGINA PECTORIS: ICD-10-CM

## 2020-09-10 DIAGNOSIS — I25.5 ISCHEMIC CARDIOMYOPATHY: ICD-10-CM

## 2020-09-10 DIAGNOSIS — I49.3 PVC'S (PREMATURE VENTRICULAR CONTRACTIONS): ICD-10-CM

## 2020-09-10 DIAGNOSIS — Z98.890 STATUS POST MITRAL VALVE REPAIR: Primary | ICD-10-CM

## 2020-09-10 PROCEDURE — 93000 ELECTROCARDIOGRAM COMPLETE: CPT | Performed by: INTERNAL MEDICINE

## 2020-09-10 PROCEDURE — 99214 OFFICE O/P EST MOD 30 MIN: CPT | Performed by: INTERNAL MEDICINE

## 2020-09-28 NOTE — PROGRESS NOTES
Date of Office Visit:  9/10/2020  Encounter Provider: Jesus James MD  Place of Service: [facility] CARDIOLOGY  Patient Name: Angeles Oquendo  :1965    Chief complaint: Follow-up for coronary artery disease, mitral valve repair, and   ischemic cardiomyopathy.    History of Present Illness:    I again had the pleasure of seeing the patient in cardiology office on 9/10/2020.  She is   a very pleasant 55 year-old white female with a history of rheumatic mitral disease,   complex mitral valve repair, coronary artery disease, ischemic cardiomyopathy, type 1   diabetes on an insulin pump, and rheumatoid arthritis who presents for follow-up.  I   initially saw the patient on 2018.  She was scheduled for hysterectomy, and had an   abnormal preoperative EKG showing a possible old inferior infarct. She was still smoking   one pack of cigarettes per day at that time as well, although she was not having any   symptoms.  An echocardiogram and Lexiscan Myoview stress test were performed on   2018.  The echocardiogram showed severe inferior and inferoseptal wall motion   abnormalities, with an ejection fraction of 40-45%. She also had a rheumatic mitral valve   with severe mitral regurgitation and at least moderate mitral stenosis.  Her nuclear   stress test confirmed a large infarct in the inferior wall.  She underwent a left heart   catheterization on 2018 which showed severe coronary artery disease, including   75-80% mid LAD disease, 50% mid left circumflex disease, and an occluded RCA with   recanalization and collaterals from the left system.  She ultimately underwent a 4 vessel   CABG (LIMA-LAD, SVG-D2, and sequential SVG-PDA and PLV) and complex mitral   valve repair by Dr. Tobias on 2018.  The mitral valve repair consists of a 26 mm   Physio II ring annuloplasty and posteromedial commissuroplasty.  She did fairly well   postoperatively.    The patient  presents today for follow-up.  The patient states that she has had issues   with her rheumatoid arthritis for the last 5 to 6 months, and this has really been out of   control.  She has been on prednisone, and has had side effects from this in general.    She gets occasional palpitations, but nothing significant.  She does have frequent PVCs   on her EKG and on her exam today.  She has not had any significant chest discomfort.    Her shortness of breath has been at relative baseline.    Past Medical History:   Diagnosis Date   • Anesthesia complication     WITH LAURO TOOK A  LOT TO GET HER TO SLEEP   • Asthma    • Bipolar I disorder, single manic episode (CMS/HCC)    • Bursitis     LEFT ELBOW   • Cardiomyopathy (CMS/HCC)    • Chronic pain syndrome     SEES DR GODWIN NOLASCO FOR PAIN   • Coronary artery disease     4 vessel CABG 7/19/18 by Dr. Tobias (WEISS-LAD, sequential SVG-PDA and PLV, SVG-D2)   • Diabetes mellitus (CMS/HCC)     TYPE 2. HAS INSULIN PUMP WITH CONT BASAL RATE   • Disc degeneration, lumbar    • Esophageal reflux    • Fibromyalgia    • H/O seasonal allergies    • History of repair of mitral valve     26 mm Physio II ring annuloplasty and posteromedial commisuroplasty by Dr. Tobias on 7/19/18   • History of sleep apnea     2003 SLEEP STUDY. HAD MACHINE THEN. USED 3YRS. SLEEP STUDY REPEATED. WAS OKED NOT TO USE MACHING ANY MORE   • Hyperlipidemia    • Hypertension    • Hypothyroidism    • Mitral regurgitation     Severe rheumatic mitral regurgitation prior to mitral valve repair.   • CARLTON (obstructive sleep apnea)     not on CPAP   • PCOS (polycystic ovarian syndrome)    • Rheumatoid arthritis (CMS/HCC)    • SOB (shortness of breath)    • Statin intolerance     Severe myalgias    • Vitamin D deficiency        Past Surgical History:   Procedure Laterality Date   • CARDIAC CATHETERIZATION N/A 6/29/2018    Procedure: Right and Left Heart Cath and cors;  Surgeon: Stalin Handy MD;  Location: [facility] INVASIVE  "LOCATION;  Service: Cardiovascular   • CARDIAC CATHETERIZATION N/A 2018    Procedure: Coronary angiography;  Surgeon: Stalin Handy MD;  Location:  [facility] CATH INVASIVE LOCATION;  Service: Cardiovascular   • CARDIAC CATHETERIZATION N/A 2018    Procedure: Left ventriculography;  Surgeon: Stalin Handy MD;  Location:  [facility] CATH INVASIVE LOCATION;  Service: Cardiovascular   •  SECTION      X2   • CORONARY ARTERY BYPASS GRAFT     • CORONARY ARTERY BYPASS GRAFT WITH MITRAL VALVE REPAIR/REPLACEMENT N/A 2018    Procedure: LAURO STERNOTOMY CORONARY ARTERY BYPASS GRAFT TIMES 4 USING LEFT INTERNAL MAMMARY ARTERY AND RIGHT GREATER SAPHENOUS VEIN GRAFT PER ENDOSCOPIC VEIN HARVESTING, MITRAL VALVE REPAIR AND PRP;  Surgeon: Kevin Tobias MD;  Location: [facility] OR;  Service: Cardiothoracic   • ENDOMETRIAL ABLATION      X2   • MITRAL VALVE REPAIR/REPLACEMENT     • TONSILLECTOMY     • UTERINE FIBROID SURGERY      Ablasion       Current Outpatient Medications on File Prior to Visit   Medication Sig Dispense Refill   • aspirin 81 MG EC tablet Take 1 tablet by mouth Daily. 100 tablet 99   • buPROPion XL (WELLBUTRIN XL) 150 MG 24 hr tablet Take 450 mg by mouth Daily.     • carvedilol (COREG) 6.25 MG tablet TAKE 1 TABLET BY MOUTH EVERY 12 HOURS 60 tablet 0   • carvedilol (COREG) 6.25 MG tablet TAKE 1 TABLET BY MOUTH EVERY 12 HOURS 180 tablet 3   • clonazePAM (KlonoPIN) 1 MG tablet Take 1 mg by mouth 4 (Four) Times a Day As Needed for Anxiety.     • colesevelam (WELCHOL) 3.75 g pack pack Take 1 packet by mouth 2 (Two) Times a Day. (Patient taking differently: Take 3.75 g by mouth 2 (Two) Times a Day. PT STATES IS NOT A \"PACK\" ITS 3 PILLS BID) 30 each    • Ergocalciferol (VITAMIN D2 PO) Take 1 capsule by mouth.     • fexofenadine (ALLEGRA) 180 MG tablet Take 180 mg by mouth Daily.     • furosemide (LASIX) 40 MG tablet TAKE 1 TABLET BY MOUTH DAILY 90 tablet 3   • gabapentin (NEURONTIN) 600 MG tablet " Take 600 mg by mouth 3 (Three) Times a Day.     • HYDROcodone-acetaminophen (NORCO)  MG per tablet Take 1 tablet by mouth Every 6 (Six) Hours As Needed for Moderate Pain .     • insulin lispro (HumaLOG) 100 UNIT/ML injection Inject  under the skin. 2 UNITS ON 4 CARBS BASAL RATE OF 3 PER HOUR. VIA INSULIN PUMP     • levothyroxine (SYNTHROID, LEVOTHROID) 175 MCG tablet Take 175 mcg by mouth Daily.     • methocarbamol (ROBAXIN) 750 MG tablet Take 750 mg by mouth 2 (Two) Times a Day As Needed.     • Misc. Devices (SYMPHONY DOUBLE PUMPING SYSTEM) misc Every 30 (Thirty) Days.     • mometasone-formoterol (DULERA 100) 100-5 MCG/ACT inhaler Inhale 2 puffs 2 (Two) Times a Day.     • montelukast (SINGULAIR) 10 MG tablet Take 10 mg by mouth Every Night.     • omeprazole (priLOSEC) 40 MG capsule Take 40 mg by mouth Daily As Needed.     • potassium chloride (MICRO-K) 10 MEQ CR capsule TAKE 2 CAPSULES BY MOUTH DAILY 180 capsule 0   • QUEtiapine (SEROquel) 300 MG tablet Take 200 mg by mouth Every Night.     • SIMPONI 50 MG/0.5ML solution auto-injector      • topiramate (TOPAMAX) 100 MG tablet Take 300 mg by mouth Every Night.     • Vortioxetine HBr (TRINTELLIX) 5 MG tablet Take 5 mg by mouth Daily With Breakfast.     • zolpidem (AMBIEN) 10 MG tablet Take 10 mg by mouth At Night As Needed for Sleep.       No current facility-administered medications on file prior to visit.      Allergies as of 09/10/2020 - Reviewed 09/10/2020   Allergen Reaction Noted   • Statins Myalgia 05/25/2018   • Sulfa antibiotics Rash 12/14/2015     Social History     Socioeconomic History   • Marital status:      Spouse name: Not on file   • Number of children: Not on file   • Years of education: Not on file   • Highest education level: Not on file   Tobacco Use   • Smoking status: Former Smoker     Packs/day: 1.00     Years: 30.00     Pack years: 30.00     Types: Cigarettes   • Smokeless tobacco: Never Used   • Tobacco comment: Quit 6/2018  "  Substance and Sexual Activity   • Alcohol use: No   • Drug use: No     Family History   Problem Relation Age of Onset   • Anemia Other    • Arthritis Other    • Asthma Other    • Depression Other         with anxiety   • Diabetes Other    • Hypertension Other    • Allergies Other    • Heart disease Other    • Diabetes Father    • Hypertension Father    • Stroke Paternal Grandmother    • Heart disease Paternal Grandmother    • Hypertension Paternal Grandmother    • Heart attack Paternal Grandfather    • Diabetes Paternal Grandfather    • Heart disease Paternal Grandfather    • Hypertension Paternal Grandfather    • Hypertension Mother    • Heart disease Maternal Grandmother    • Hypertension Maternal Grandmother    • Heart disease Maternal Grandfather    • Hypertension Maternal Grandfather    • Malig Hyperthermia Neg Hx        Review of Systems   Constitution: Positive for malaise/fatigue.   Endocrine: Positive for cold intolerance.   Musculoskeletal: Positive for joint pain, joint swelling and myalgias.   Gastrointestinal: Positive for abdominal pain, diarrhea and nausea.   Genitourinary: Positive for frequency.   Neurological: Positive for headaches.   All other systems reviewed and are negative.     Objective:     Vitals:    09/10/20 1443   BP: 128/74   Pulse: 73   Weight: 98.7 kg (217 lb 9.6 oz)   Height: 162.6 cm (64\")     Body mass index is 37.35 kg/m².    Physical Exam   Constitutional: She is oriented to person, place, and time. She appears well-developed and well-nourished.   HENT:   Head: Normocephalic and atraumatic.   Eyes: Conjunctivae are normal.   Neck: Neck supple.   Cardiovascular: Normal rate and regular rhythm. Exam reveals no gallop and no friction rub.   No murmur heard.  Pulmonary/Chest: Effort normal and breath sounds normal.   Abdominal: Soft. There is no abdominal tenderness.   Musculoskeletal:         General: No edema.   Neurological: She is alert and oriented to person, place, and time. "   Skin: Skin is warm.   Psychiatric: She has a normal mood and affect. Her behavior is normal.     Lab Review:     ECG 12 Lead    Date/Time: 9/10/2020 2:49 PM  Performed by: Jesus James MD  Authorized by: Jesus James MD   Comparison: compared with previous ECG from 10/11/2018  Similar to previous ECG  Rhythm: sinus rhythm  Ectopy: unifocal PVCs  Rate: normal  BPM: 73  Other findings: T wave abnormality, left atrial abnormality and prolonged QTc interval    Clinical impression: abnormal EKG  Comments: Inferior infarct, age undetermined          Cardiac Procedures:  1. Left heart catheterization on 6/29/2018: The left main was normal.  The LAD had   75-80% mid disease.  There was a large diagonal with 20% proximal disease and   30-40% distal disease.  The left circumflex had 50% mid disease.  The RCA was   occluded with recanalization and collaterals from the left.  2. Status post four-vessel CABG (LIMA-LAD, SVG-D2, and sequential SVG-PDA and   PLV) and complex mitral valve repair by Dr. Tobias on 7/19/2018.  The mitral valve repair   consists of a 26 mm Physio II ring annuloplasty and posteromedial commissuroplasty.  3. Echocardiogram on 7/23/2018: There was moderate hypokinesis of the basal to   mid inferior wall and severe hypokinesis of the basal to mid inferoseptum.  The ejection  fraction was 45%.  The left atrium was moderately dilated.  There was mild aortic   insufficiency.  The mitral valve repair appeared to be intact with elevated gradients   (peak gradient 17 mmHg and mean gradient 8 mmHg).  There was trace mitral   regurgitation.  There was mild tricuspid regurgitation.  The capillary RVSP was 40   mmHg.    Assessment:       Diagnosis Plan   1. Status post mitral valve repair  Adult Transthoracic Echo Complete W/ Cont if Necessary Per Protocol   2. PVC's (premature ventricular contractions)  Holter Monitor - 24 Hour   3. Ischemic cardiomyopathy     4. Coronary artery disease involving  native coronary artery of native heart without angina pectoris       Plan:       She does have multiple PVCs noted on her EKG today.  She has had only occasional palpitations, although I am going to check a Holter monitor to evaluate her PVC burden over 24 hours.  She has not had significant episodes of PVCs noted in the past.  This could also be a response to her rheumatoid arthritis, which has been significantly uncontrolled in the last several months.  She has been on prednisone intermittently multiple times.    I am going to recheck an echocardiogram at this point.  Her postoperative echocardiogram showed an ejection fraction of 45% with inferior and inferoseptal hypokinesis.  I suspect this may not improve as this is likely from her coronary artery disease and a prior infarct.  Her mitral valve repair also needs to be reevaluated.  I did remind her that she does need prophylactic antibiotics before any dental appointments.      She will continue on the aspirin at 81 mg/day, carvedilol 6.25 mg twice a day, and Lasix 40 mg/day.  She appears to be euvolemic.  I need her most recent labs to reevaluate her renal function.  If possible it may be beneficial to place her on an ACE inhibitor or ARB.  She was not on an ACE inhibitor or ARB postoperatively secondary to hypotension.  She has been intolerant to multiple statins secondary to myalgias and worsening of her rheumatoid arthritis.  She might be a candidate for either Repatha or Praluent.  Again, I will need to review her lipid panel.    I will see her back in 6 months unless other issues arise.

## 2020-09-29 ENCOUNTER — HOSPITAL ENCOUNTER (OUTPATIENT)
Dept: CARDIOLOGY | Facility: HOSPITAL | Age: 55
Discharge: HOME | End: 2020-09-29
Admitting: INTERNAL MEDICINE

## 2020-09-29 VITALS
HEART RATE: 84 BPM | DIASTOLIC BLOOD PRESSURE: 72 MMHG | BODY MASS INDEX: 37.05 KG/M2 | WEIGHT: 217 LBS | HEIGHT: 64 IN | SYSTOLIC BLOOD PRESSURE: 120 MMHG

## 2020-09-29 DIAGNOSIS — Z98.890 STATUS POST MITRAL VALVE REPAIR: ICD-10-CM

## 2020-09-29 PROCEDURE — 93306 TTE W/DOPPLER COMPLETE: CPT

## 2020-09-29 PROCEDURE — 25010000002 PERFLUTREN (DEFINITY) 8.476 MG IN SODIUM CHLORIDE 0.9 % 10 ML INJECTION: Performed by: INTERNAL MEDICINE

## 2020-09-29 PROCEDURE — 93306 TTE W/DOPPLER COMPLETE: CPT | Performed by: INTERNAL MEDICINE

## 2020-09-29 RX ADMIN — PERFLUTREN 1.5 ML: 6.52 INJECTION, SUSPENSION INTRAVENOUS at 13:55

## 2020-09-30 LAB
AORTIC ARCH: 2.3 CM
ASCENDING AORTA: 0.5 CM
BH CV ECHO MEAS - ACS: 2 CM
BH CV ECHO MEAS - AI DEC SLOPE: 173.4 CM/SEC^2
BH CV ECHO MEAS - AI MAX PG: 41.2 MMHG
BH CV ECHO MEAS - AI MAX VEL: 320.8 CM/SEC
BH CV ECHO MEAS - AI P1/2T: 541.9 MSEC
BH CV ECHO MEAS - AO MAX PG (FULL): 5.9 MMHG
BH CV ECHO MEAS - AO MAX PG: 9.7 MMHG
BH CV ECHO MEAS - AO MEAN PG (FULL): 3.7 MMHG
BH CV ECHO MEAS - AO MEAN PG: 5.8 MMHG
BH CV ECHO MEAS - AO ROOT AREA (BSA CORRECTED): 1.7
BH CV ECHO MEAS - AO ROOT AREA: 9.1 CM^2
BH CV ECHO MEAS - AO ROOT DIAM: 3.4 CM
BH CV ECHO MEAS - AO V2 MAX: 155.5 CM/SEC
BH CV ECHO MEAS - AO V2 MEAN: 111.4 CM/SEC
BH CV ECHO MEAS - AO V2 VTI: 29.7 CM
BH CV ECHO MEAS - ASC AORTA: 3.5 CM
BH CV ECHO MEAS - AVA(I,A): 1.6 CM^2
BH CV ECHO MEAS - AVA(I,D): 1.6 CM^2
BH CV ECHO MEAS - AVA(V,A): 1.8 CM^2
BH CV ECHO MEAS - AVA(V,D): 1.8 CM^2
BH CV ECHO MEAS - BSA(HAYCOCK): 2.2 M^2
BH CV ECHO MEAS - BSA: 2 M^2
BH CV ECHO MEAS - BZI_BMI: 37.2 KILOGRAMS/M^2
BH CV ECHO MEAS - BZI_METRIC_HEIGHT: 162.6 CM
BH CV ECHO MEAS - BZI_METRIC_WEIGHT: 98.4 KG
BH CV ECHO MEAS - EDV(MOD-SP2): 149 ML
BH CV ECHO MEAS - EDV(MOD-SP4): 116 ML
BH CV ECHO MEAS - EDV(TEICH): 164.4 ML
BH CV ECHO MEAS - EF(CUBED): 27.7 %
BH CV ECHO MEAS - EF(MOD-BP): 43 %
BH CV ECHO MEAS - EF(MOD-SP2): 37.6 %
BH CV ECHO MEAS - EF(MOD-SP4): 46.6 %
BH CV ECHO MEAS - EF(TEICH): 22.1 %
BH CV ECHO MEAS - ESV(MOD-SP2): 93 ML
BH CV ECHO MEAS - ESV(MOD-SP4): 62 ML
BH CV ECHO MEAS - ESV(TEICH): 128.1 ML
BH CV ECHO MEAS - FS: 10.3 %
BH CV ECHO MEAS - IVS/LVPW: 1.1
BH CV ECHO MEAS - IVSD: 1.1 CM
BH CV ECHO MEAS - LAT PEAK E' VEL: 2.9 CM/SEC
BH CV ECHO MEAS - LV DIASTOLIC VOL/BSA (35-75): 57.3 ML/M^2
BH CV ECHO MEAS - LV MASS(C)D: 241 GRAMS
BH CV ECHO MEAS - LV MASS(C)DI: 119 GRAMS/M^2
BH CV ECHO MEAS - LV MAX PG: 3.7 MMHG
BH CV ECHO MEAS - LV MEAN PG: 2.1 MMHG
BH CV ECHO MEAS - LV SYSTOLIC VOL/BSA (12-30): 30.6 ML/M^2
BH CV ECHO MEAS - LV V1 MAX: 96.7 CM/SEC
BH CV ECHO MEAS - LV V1 MEAN: 66.5 CM/SEC
BH CV ECHO MEAS - LV V1 VTI: 16.8 CM
BH CV ECHO MEAS - LVIDD: 5.8 CM
BH CV ECHO MEAS - LVIDS: 5.2 CM
BH CV ECHO MEAS - LVLD AP2: 7.5 CM
BH CV ECHO MEAS - LVLD AP4: 7.2 CM
BH CV ECHO MEAS - LVLS AP2: 6.7 CM
BH CV ECHO MEAS - LVLS AP4: 6.5 CM
BH CV ECHO MEAS - LVOT AREA (M): 2.8 CM^2
BH CV ECHO MEAS - LVOT AREA: 2.9 CM^2
BH CV ECHO MEAS - LVOT DIAM: 1.9 CM
BH CV ECHO MEAS - LVPWD: 0.98 CM
BH CV ECHO MEAS - MED PEAK E' VEL: 2.9 CM/SEC
BH CV ECHO MEAS - MR MAX PG: 54.8 MMHG
BH CV ECHO MEAS - MR MAX VEL: 370.2 CM/SEC
BH CV ECHO MEAS - MV A DUR: 0.12 SEC
BH CV ECHO MEAS - MV A MAX VEL: 153 CM/SEC
BH CV ECHO MEAS - MV DEC SLOPE: 439.4 CM/SEC^2
BH CV ECHO MEAS - MV DEC TIME: 0.43 SEC
BH CV ECHO MEAS - MV E MAX VEL: 154 CM/SEC
BH CV ECHO MEAS - MV E/A: 1
BH CV ECHO MEAS - MV MAX PG: 8.8 MMHG
BH CV ECHO MEAS - MV MEAN PG: 5.5 MMHG
BH CV ECHO MEAS - MV P1/2T MAX VEL: 146.6 CM/SEC
BH CV ECHO MEAS - MV P1/2T: 97.7 MSEC
BH CV ECHO MEAS - MV V2 MAX: 148 CM/SEC
BH CV ECHO MEAS - MV V2 MEAN: 113 CM/SEC
BH CV ECHO MEAS - MV V2 VTI: 43.6 CM
BH CV ECHO MEAS - MVA P1/2T LCG: 1.5 CM^2
BH CV ECHO MEAS - MVA(P1/2T): 2.3 CM^2
BH CV ECHO MEAS - MVA(VTI): 1.1 CM^2
BH CV ECHO MEAS - PA ACC TIME: 0.08 SEC
BH CV ECHO MEAS - PA MAX PG (FULL): 0.64 MMHG
BH CV ECHO MEAS - PA MAX PG: 3.2 MMHG
BH CV ECHO MEAS - PA PR(ACCEL): 43.3 MMHG
BH CV ECHO MEAS - PA V2 MAX: 89.6 CM/SEC
BH CV ECHO MEAS - PULM A REVS DUR: 0.08 SEC
BH CV ECHO MEAS - PULM A REVS VEL: 21.9 CM/SEC
BH CV ECHO MEAS - PULM DIAS VEL: 41.4 CM/SEC
BH CV ECHO MEAS - PULM S/D: 1
BH CV ECHO MEAS - PULM SYS VEL: 42.4 CM/SEC
BH CV ECHO MEAS - PVA(V,A): 3.2 CM^2
BH CV ECHO MEAS - PVA(V,D): 3.2 CM^2
BH CV ECHO MEAS - QP/QS: 1.3
BH CV ECHO MEAS - RAP SYSTOLE: 3 MMHG
BH CV ECHO MEAS - RV MAX PG: 2.6 MMHG
BH CV ECHO MEAS - RV MEAN PG: 1.6 MMHG
BH CV ECHO MEAS - RV V1 MAX: 80.1 CM/SEC
BH CV ECHO MEAS - RV V1 MEAN: 61.1 CM/SEC
BH CV ECHO MEAS - RV V1 VTI: 18.2 CM
BH CV ECHO MEAS - RVOT AREA: 3.5 CM^2
BH CV ECHO MEAS - RVOT DIAM: 2.1 CM
BH CV ECHO MEAS - RVSP: 19 MMHG
BH CV ECHO MEAS - SI(AO): 134 ML/M^2
BH CV ECHO MEAS - SI(CUBED): 26.3 ML/M^2
BH CV ECHO MEAS - SI(LVOT): 23.8 ML/M^2
BH CV ECHO MEAS - SI(MOD-SP2): 27.7 ML/M^2
BH CV ECHO MEAS - SI(MOD-SP4): 26.7 ML/M^2
BH CV ECHO MEAS - SI(TEICH): 17.9 ML/M^2
BH CV ECHO MEAS - SUP REN AO DIAM: 2.4 CM
BH CV ECHO MEAS - SV(AO): 271.4 ML
BH CV ECHO MEAS - SV(CUBED): 53.2 ML
BH CV ECHO MEAS - SV(LVOT): 48.2 ML
BH CV ECHO MEAS - SV(MOD-SP2): 56 ML
BH CV ECHO MEAS - SV(MOD-SP4): 54 ML
BH CV ECHO MEAS - SV(RVOT): 64.3 ML
BH CV ECHO MEAS - SV(TEICH): 36.3 ML
BH CV ECHO MEAS - TAPSE (>1.6): 1.7 CM
BH CV ECHO MEAS - TR MAX VEL: 202.1 CM/SEC
BH CV ECHO MEASUREMENTS AVERAGE E/E' RATIO: 53.1
BH CV XLRA - RV BASE: 3.2 CM
BH CV XLRA - RV LENGTH: 7.5 CM
BH CV XLRA - RV MID: 3.5 CM
BH CV XLRA - TDI S': 4.9 CM/SEC
LEFT ATRIUM VOLUME INDEX: 32 ML/M2
MAXIMAL PREDICTED HEART RATE: 167 BPM
SINUS: 3.1 CM
STJ: 3.3 CM
STRESS TARGET HR: 142 BPM

## 2021-03-24 ENCOUNTER — BULK ORDERING (OUTPATIENT)
Dept: CASE MANAGEMENT | Facility: OTHER | Age: 56
End: 2021-03-24

## 2021-03-24 DIAGNOSIS — Z23 IMMUNIZATION DUE: ICD-10-CM

## 2021-06-21 RX ORDER — CARVEDILOL 6.25 MG/1
TABLET ORAL
Qty: 180 TABLET | Refills: 3 | Status: SHIPPED | OUTPATIENT
Start: 2021-06-21 | End: 2021-11-01 | Stop reason: SDUPTHER

## 2021-11-01 ENCOUNTER — OFFICE VISIT (OUTPATIENT)
Dept: CARDIOLOGY | Facility: CLINIC | Age: 56
End: 2021-11-01

## 2021-11-01 VITALS
HEART RATE: 79 BPM | OXYGEN SATURATION: 98 % | WEIGHT: 223 LBS | HEIGHT: 65 IN | BODY MASS INDEX: 37.15 KG/M2 | SYSTOLIC BLOOD PRESSURE: 154 MMHG | DIASTOLIC BLOOD PRESSURE: 104 MMHG

## 2021-11-01 DIAGNOSIS — Z98.890 STATUS POST MITRAL VALVE REPAIR: ICD-10-CM

## 2021-11-01 DIAGNOSIS — Z78.9 STATIN INTOLERANCE: ICD-10-CM

## 2021-11-01 DIAGNOSIS — E78.5 HYPERLIPIDEMIA, UNSPECIFIED HYPERLIPIDEMIA TYPE: ICD-10-CM

## 2021-11-01 DIAGNOSIS — I25.10 CORONARY ARTERY DISEASE INVOLVING NATIVE CORONARY ARTERY OF NATIVE HEART WITHOUT ANGINA PECTORIS: Primary | ICD-10-CM

## 2021-11-01 DIAGNOSIS — I25.5 ISCHEMIC CARDIOMYOPATHY: ICD-10-CM

## 2021-11-01 DIAGNOSIS — M06.9 RHEUMATOID ARTHRITIS, INVOLVING UNSPECIFIED SITE, UNSPECIFIED WHETHER RHEUMATOID FACTOR PRESENT (HCC): ICD-10-CM

## 2021-11-01 PROCEDURE — 99214 OFFICE O/P EST MOD 30 MIN: CPT | Performed by: NURSE PRACTITIONER

## 2021-11-01 PROCEDURE — 93000 ELECTROCARDIOGRAM COMPLETE: CPT | Performed by: NURSE PRACTITIONER

## 2021-11-01 RX ORDER — TRAMADOL HYDROCHLORIDE 50 MG/1
50 TABLET ORAL EVERY 6 HOURS PRN
COMMUNITY
Start: 2021-10-26

## 2021-11-01 RX ORDER — UPADACITINIB 15 MG/1
TABLET, EXTENDED RELEASE ORAL
Status: ON HOLD | COMMUNITY
Start: 2021-09-07 | End: 2022-07-26

## 2021-11-01 NOTE — PROGRESS NOTES
CARDIOLOGY        Patient Name: Angeles Oquendo  :1965  Age: 56 y.o.  Primary Cardiologist: Chino James MD  Encounter Provider:  KALYANI Qiu    Date of Service: 21          CHIEF COMPLAINT / REASON FOR OFFICE VISIT     Coronary Artery Disease (6 month f/u)      HISTORY OF PRESENT ILLNESS       HPI  Angeles Oquendo is a 56 y.o. female who presents today for semiannual evaluation.     Pt has a  history significant for CAD, cardiomyopathy, mitral valve regurgitation, hyperlipidemia, bipolar disorder, rheumatoid arthritis, statin intolerance (has tried atorvastatin and rosuvastatin).    Patient presents today for semiannual follow-up.  She states that she has been having a difficult time with keeping pain controlled from her rheumatoid arthritis.  From cardiovascular standpoint she is doing well.  She currently denies angina, dyspnea with exertion, palpitations, lightheadedness.  She has generalized fatigue which is stable for her.  She also complains of shortness of breath secondary to seasonal allergies.  She also notes that her insurance company recently stopped paying for her Singulair and this has made her shortness of breath worse.  She states that she has been having trouble with keeping blood glucose controlled and her PCP is monitoring.  She is currently in the process of obtaining a new endocrinologist.  She does not routinely monitor her blood pressure at home but states that at her physician's office 2 weeks ago blood pressure was very well controlled.  She states that her PCP is also trying to seek approval for PSK 9 inhibitors to get her LDL at goal.  LDL was elevated and in this patient with history of CABG and statin intolerance PSK 9 inhibitors are only option.  Patient states that she has tried atorvastatin and rosuvastatin in the past and states that her pain was so debilitating that she ended up on narcotic pain medication.  As soon as she stopped the statin drugs  "she was able to come off of all narcotic pain.    The following portions of the patient's history were reviewed and updated as appropriate: allergies, current medications, past family history, past medical history, past social history, past surgical history and problem list.      VITAL SIGNS     Visit Vitals  BP (!) 154/104 (BP Location: Right arm, Patient Position: Sitting, Cuff Size: Large Adult)   Pulse 79   Ht 165.1 cm (65\")   Wt 101 kg (223 lb)   SpO2 98%   BMI 37.11 kg/m²         Wt Readings from Last 3 Encounters:   11/01/21 101 kg (223 lb)   09/29/20 98.4 kg (217 lb)   09/10/20 98.7 kg (217 lb 9.6 oz)     Body mass index is 37.11 kg/m².      REVIEW OF SYSTEMS   Review of Systems   Constitutional: Positive for malaise/fatigue. Negative for chills, fever, weight gain and weight loss.   Cardiovascular: Negative for leg swelling.   Respiratory: Positive for shortness of breath. Negative for cough, snoring and wheezing.    Hematologic/Lymphatic: Negative for bleeding problem. Does not bruise/bleed easily.   Skin: Negative for color change.   Musculoskeletal: Positive for myalgias. Negative for falls and joint pain.   Gastrointestinal: Negative for melena.   Genitourinary: Negative for hematuria.   Neurological: Negative for excessive daytime sleepiness.   Psychiatric/Behavioral: Negative for depression. The patient is not nervous/anxious.            PHYSICAL EXAMINATION     Vitals and nursing note reviewed.   Constitutional:       Appearance: Normal appearance. Well-developed.   Eyes:      Conjunctiva/sclera: Conjunctivae normal.   Neck:      Vascular: No carotid bruit.   Pulmonary:      Breath sounds: Normal breath sounds.   Cardiovascular:      Normal rate. Regular rhythm. Normal S1 with normal intensity. Normal S2 with normal intensity.      Murmurs: There is no murmur.      No gallop. No click. No rub.   Musculoskeletal: Normal range of motion. Skin:     General: Skin is warm and dry.   Neurological:      " Mental Status: Alert and oriented to person, place, and time.      GCS: GCS eye subscore is 4. GCS verbal subscore is 5. GCS motor subscore is 6.   Psychiatric:         Speech: Speech normal.         Behavior: Behavior normal.         Thought Content: Thought content normal.         Judgment: Judgment normal.           REVIEWED DATA       ECG 12 Lead    Date/Time: 11/1/2021 10:43 AM  Performed by: Ayesha Wright APRN  Authorized by: Ayesha Wright APRN   Comparison: compared with previous ECG from 9/10/2020  Rhythm: sinus rhythm  Ectopy: unifocal PVCs  Rate: normal  BPM: 78  Conduction: conduction normal  ST Segments: ST segments normal  T Waves: T waves normal  QRS axis: normal  Other findings: left ventricular hypertrophy    Clinical impression: abnormal EKG  Comments: Difficult to obtain ECG as patient has on body oils that were unable to be removed even with alcohol pads.            Cardiac Procedures:  1. Left heart catheterization on 6/29/2018: The left main was normal.  The LAD had 75-80% mid disease.  There was a large diagonal with 20% proximal disease and 30-40% distal disease.  The left circumflex had 50% mid disease.  The RCA was occluded with recanalization and collaterals from the left.  2. Status post four-vessel CABG (LIMA-LAD, SVG-D2, and sequential SVG-PDA and PLV) and complex mitral valve repair by Dr. Tobias on 7/19/2018.  The mitral valve repair consists of a 26 mm Physio II ring annuloplasty and posteromedial commissuroplasty.  3. Echocardiogram on 7/23/2018: There was moderate hypokinesis of the basal to mid inferior wall and severe hypokinesis of the basal to mid inferoseptum.  The ejection fraction was 45%.  The left atrium was moderately dilated.  There was mild aortic insufficiency.  The mitral valve repair appeared to be intact with elevated gradients (peak gradient 17 mmHg and mean gradient 8 mmHg).  There was trace mitral regurgitation.  There was mild tricuspid regurgitation.   The capillary RVSP was 40 mmHg.  4. Echocardiogram 9/29/2020.  LVEF 43%.  LV cavity is mildly dilated.  Mild LVH.  Normal RV cavity size and systolic function.  LAE.  Mild aortic valve regurgitation.  Mitral valve ring present and gradients across the mitral valve repair are normal.  No evidence of pericardial effusion.  5. 24-hour monitor 10/7/2020.  PVCs occurred frequently.             ASSESSMENT & PLAN      Diagnosis Plan   1. Coronary artery disease involving native coronary artery of native heart without angina pectoris  ECG 12 Lead   2. Ischemic cardiomyopathy     3. Status post mitral valve repair     4. Hyperlipidemia, unspecified hyperlipidemia type     5. Statin intolerance     6. Rheumatoid arthritis, involving unspecified site, unspecified whether rheumatoid factor present ([facility])           SUMMARY/DISCUSSION  1. CAD with prior CABG.  Patient currently denies angina, dyspnea with exertion or lightheadedness.  She has history of statin intolerance that was debilitating.  No ischemic changes on ECG today.  Continue GDMT with aspirin, carvedilol.  2. Ischemic cardiomyopathy.  Most recent LVEF 43%.  Patient currently euvolemic.  Denies dyspnea, dyspnea with exertion or edema.  Continue carvedilol.  3. History of mitral valve repair.  Mitral valve repair ring was stable on echocardiogram September 2020.  4. Hyperlipidemia with statin intolerance.  Patient reports that LDL is currently not at goal.  She has tried atorvastatin and rosuvastatin in the past and her myalgias were so debilitating that she ended up on narcotic pain medicine.  Once statin drug was stopped she was able to discontinue narcotic pain medicines.  With patient's history of CAD with prior CABG she would greatly benefit from PSK 9 inhibitor such as Praluent or Repatha.  She states that PCP is attempting to obtain insurance approval, I do agree with the PCPs recommendation and think that this would improve patient's risk of further  cardiovascular episodes.  5. Rheumatoid arthritis  6. Hypertension blood pressure is elevated in office today in the 150s/90s.  She does not routinely monitor blood pressure at home however at a appointment 2 weeks ago blood pressure was controlled in the 120s/80s.  Patient will keep a blood pressure diary over the next week, if blood pressure averages greater than 140 we will uptitrate her carvedilol or possibly add ACE/ARB to get goal blood pressure.  7. Patient will call me in 1 week with blood pressure readings.  Follow-up with Dr. James for routine evaluation in 6 months.        MEDICATIONS         Discharge Medications          Accurate as of November 1, 2021 10:58 AM. If you have any questions, ask your nurse or doctor.            Continue These Medications      Instructions Start Date   aspirin 81 MG EC tablet   81 mg, Oral, Daily      buPROPion  MG 24 hr tablet  Commonly known as: WELLBUTRIN XL   450 mg, Oral, Daily      carvedilol 6.25 MG tablet  Commonly known as: COREG   TAKE 1 TABLET BY MOUTH EVERY 12 HOURS      clonazePAM 1 MG tablet  Commonly known as: KlonoPIN   1 mg, Oral, 4 Times Daily PRN      fexofenadine 180 MG tablet  Commonly known as: ALLEGRA   180 mg, Oral, Daily      furosemide 40 MG tablet  Commonly known as: LASIX   40 mg, Oral, Daily      HYDROcodone-acetaminophen  MG per tablet  Commonly known as: NORCO   1 tablet, Oral, Every 6 Hours PRN      insulin lispro 100 UNIT/ML injection  Commonly known as: humaLOG   Subcutaneous, 2 UNITS ON 4 CARBS BASAL RATE OF 3 PER HOUR. VIA INSULIN PUMP      levothyroxine 175 MCG tablet  Commonly known as: SYNTHROID, LEVOTHROID   175 mcg, Oral, Daily      mometasone-formoterol 100-5 MCG/ACT inhaler  Commonly known as: DULERA 100   2 puffs, Inhalation, 2 Times Daily - RT      omeprazole 40 MG capsule  Commonly known as: priLOSEC   40 mg, Oral, Daily PRN      QUEtiapine 300 MG tablet  Commonly known as: SEROquel   200 mg, Oral, Nightly       Rinvoq 15 MG tablet sustained-release 24 hour  Generic drug: Upadacitinib ER   No dose, route, or frequency recorded.      [employer] misc   Does not apply, Every 30 Days      topiramate 100 MG tablet  Commonly known as: TOPAMAX   300 mg, Oral, Nightly      traMADol 50 MG tablet  Commonly known as: ULTRAM   No dose, route, or frequency recorded.      Trintellix 5 MG tablet  Generic drug: Vortioxetine HBr   5 mg, Oral, Daily With Breakfast      VITAMIN D2 PO   1 capsule, Oral         Stop These Medications    Ambien 10 MG tablet  Generic drug: zolpidem  Stopped by: KALYANI Qiu     colesevelam 3.75 g pack pack  Commonly known as: WELCHOL  Stopped by: KALYANI Qiu     gabapentin 600 MG tablet  Commonly known as: NEURONTIN  Stopped by: KALYANI Qiu     methocarbamol 750 MG tablet  Commonly known as: ROBAXIN  Stopped by: KALYANI Qiu     montelukast 10 MG tablet  Commonly known as: SINGULAIR  Stopped by: KALYANI Qiu     potassium chloride 10 MEQ CR capsule  Commonly known as: MICRO-K  Stopped by: KALYANI Qiu     Simponi 50 MG/0.5ML solution auto-injector  Generic drug: Golimumab  Stopped by: KALYANI Qiu                **Dragon Disclaimer:   Much of this encounter note is an electronic transcription/translation of spoken language to printed text. The electronic translation of spoken language may permit erroneous, or at times, nonsensical words or phrases to be inadvertently transcribed. Although I have reviewed the note for such errors, some may still exist.

## 2021-11-10 DIAGNOSIS — I10 PRIMARY HYPERTENSION: Primary | ICD-10-CM

## 2021-11-10 RX ORDER — LOSARTAN POTASSIUM 25 MG/1
25 TABLET ORAL DAILY
Qty: 30 TABLET | Refills: 11 | Status: SHIPPED | OUTPATIENT
Start: 2021-11-10 | End: 2022-02-14 | Stop reason: SDUPTHER

## 2021-12-28 RX ORDER — FUROSEMIDE 40 MG/1
40 TABLET ORAL DAILY
Qty: 90 TABLET | Refills: 3 | Status: SHIPPED | OUTPATIENT
Start: 2021-12-28 | End: 2022-02-14 | Stop reason: SDUPTHER

## 2022-02-14 RX ORDER — FUROSEMIDE 40 MG/1
40 TABLET ORAL DAILY
Qty: 90 TABLET | Refills: 1 | Status: ON HOLD | OUTPATIENT
Start: 2022-02-14 | End: 2022-07-26

## 2022-02-14 RX ORDER — LOSARTAN POTASSIUM 25 MG/1
25 TABLET ORAL DAILY
Qty: 90 TABLET | Refills: 1 | Status: SHIPPED | OUTPATIENT
Start: 2022-02-14 | End: 2022-07-29 | Stop reason: HOSPADM

## 2022-02-15 RX ORDER — CARVEDILOL 6.25 MG/1
6.25 TABLET ORAL EVERY 12 HOURS
Qty: 180 TABLET | Refills: 3 | Status: SHIPPED | OUTPATIENT
Start: 2022-02-15 | End: 2022-07-29 | Stop reason: HOSPADM

## 2022-07-26 ENCOUNTER — HOSPITAL ENCOUNTER (INPATIENT)
Facility: HOSPITAL | Age: 57
LOS: 3 days | Discharge: HOME | End: 2022-07-29
Attending: EMERGENCY MEDICINE | Admitting: INTERNAL MEDICINE

## 2022-07-26 ENCOUNTER — APPOINTMENT (OUTPATIENT)
Dept: GENERAL RADIOLOGY | Facility: HOSPITAL | Age: 57
End: 2022-07-26

## 2022-07-26 DIAGNOSIS — I50.9 ACUTE ON CHRONIC CONGESTIVE HEART FAILURE, UNSPECIFIED HEART FAILURE TYPE: Primary | ICD-10-CM

## 2022-07-26 DIAGNOSIS — I42.9 CARDIOMYOPATHY, UNSPECIFIED TYPE: ICD-10-CM

## 2022-07-26 DIAGNOSIS — I16.1 HYPERTENSIVE EMERGENCY: ICD-10-CM

## 2022-07-26 LAB
ALBUMIN SERPL-MCNC: 4 G/DL (ref 3.5–5.2)
ALBUMIN/GLOB SERPL: 1.7 G/DL
ALP SERPL-CCNC: 67 U/L (ref 39–117)
ALT SERPL W P-5'-P-CCNC: 26 U/L (ref 1–33)
ANION GAP SERPL CALCULATED.3IONS-SCNC: 8.5 MMOL/L (ref 5–15)
AST SERPL-CCNC: 30 U/L (ref 1–32)
B PARAPERT DNA SPEC QL NAA+PROBE: NOT DETECTED
B PERT DNA SPEC QL NAA+PROBE: NOT DETECTED
BACTERIA UR QL AUTO: ABNORMAL /HPF
BASOPHILS # BLD AUTO: 0.03 10*3/MM3 (ref 0–0.2)
BASOPHILS NFR BLD AUTO: 0.5 % (ref 0–1.5)
BILIRUB SERPL-MCNC: 0.6 MG/DL (ref 0–1.2)
BILIRUB UR QL STRIP: NEGATIVE
BUN SERPL-MCNC: 11 MG/DL (ref 6–20)
BUN/CREAT SERPL: 10.2 (ref 7–25)
C PNEUM DNA NPH QL NAA+NON-PROBE: NOT DETECTED
CALCIUM SPEC-SCNC: 8.9 MG/DL (ref 8.6–10.5)
CHLORIDE SERPL-SCNC: 105 MMOL/L (ref 98–107)
CLARITY UR: CLEAR
CO2 SERPL-SCNC: 31.5 MMOL/L (ref 22–29)
COLOR UR: YELLOW
CREAT SERPL-MCNC: 1.08 MG/DL (ref 0.57–1)
DEPRECATED RDW RBC AUTO: 45.7 FL (ref 37–54)
EGFRCR SERPLBLD CKD-EPI 2021: 60.8 ML/MIN/1.73
EOSINOPHIL # BLD AUTO: 0.19 10*3/MM3 (ref 0–0.4)
EOSINOPHIL NFR BLD AUTO: 2.9 % (ref 0.3–6.2)
ERYTHROCYTE [DISTWIDTH] IN BLOOD BY AUTOMATED COUNT: 13 % (ref 12.3–15.4)
FLUAV SUBTYP SPEC NAA+PROBE: NOT DETECTED
FLUBV RNA ISLT QL NAA+PROBE: NOT DETECTED
GLOBULIN UR ELPH-MCNC: 2.3 GM/DL
GLUCOSE BLDC GLUCOMTR-MCNC: 156 MG/DL (ref 70–130)
GLUCOSE SERPL-MCNC: 155 MG/DL (ref 65–99)
GLUCOSE UR STRIP-MCNC: NEGATIVE MG/DL
HADV DNA SPEC NAA+PROBE: NOT DETECTED
HCOV 229E RNA SPEC QL NAA+PROBE: NOT DETECTED
HCOV HKU1 RNA SPEC QL NAA+PROBE: NOT DETECTED
HCOV NL63 RNA SPEC QL NAA+PROBE: NOT DETECTED
HCOV OC43 RNA SPEC QL NAA+PROBE: NOT DETECTED
HCT VFR BLD AUTO: 45.3 % (ref 34–46.6)
HGB BLD-MCNC: 15.3 G/DL (ref 12–15.9)
HGB UR QL STRIP.AUTO: ABNORMAL
HMPV RNA NPH QL NAA+NON-PROBE: NOT DETECTED
HPIV1 RNA ISLT QL NAA+PROBE: NOT DETECTED
HPIV2 RNA SPEC QL NAA+PROBE: NOT DETECTED
HPIV3 RNA NPH QL NAA+PROBE: NOT DETECTED
HPIV4 P GENE NPH QL NAA+PROBE: NOT DETECTED
HYALINE CASTS UR QL AUTO: ABNORMAL /LPF
IMM GRANULOCYTES # BLD AUTO: 0.02 10*3/MM3 (ref 0–0.05)
IMM GRANULOCYTES NFR BLD AUTO: 0.3 % (ref 0–0.5)
INR PPP: 1.23 (ref 0.9–1.1)
KETONES UR QL STRIP: NEGATIVE
LEUKOCYTE ESTERASE UR QL STRIP.AUTO: ABNORMAL
LYMPHOCYTES # BLD AUTO: 2.09 10*3/MM3 (ref 0.7–3.1)
LYMPHOCYTES NFR BLD AUTO: 31.4 % (ref 19.6–45.3)
M PNEUMO IGG SER IA-ACNC: NOT DETECTED
MAGNESIUM SERPL-MCNC: 1.7 MG/DL (ref 1.6–2.6)
MCH RBC QN AUTO: 32.6 PG (ref 26.6–33)
MCHC RBC AUTO-ENTMCNC: 33.8 G/DL (ref 31.5–35.7)
MCV RBC AUTO: 96.4 FL (ref 79–97)
MONOCYTES # BLD AUTO: 0.39 10*3/MM3 (ref 0.1–0.9)
MONOCYTES NFR BLD AUTO: 5.9 % (ref 5–12)
NEUTROPHILS NFR BLD AUTO: 3.94 10*3/MM3 (ref 1.7–7)
NEUTROPHILS NFR BLD AUTO: 59 % (ref 42.7–76)
NITRITE UR QL STRIP: NEGATIVE
NRBC BLD AUTO-RTO: 0 /100 WBC (ref 0–0.2)
NT-PROBNP SERPL-MCNC: 1548 PG/ML (ref 0–900)
PH UR STRIP.AUTO: 7 [PH] (ref 5–8)
PLATELET # BLD AUTO: 126 10*3/MM3 (ref 140–450)
PMV BLD AUTO: 12.4 FL (ref 6–12)
POTASSIUM SERPL-SCNC: 4.3 MMOL/L (ref 3.5–5.2)
PROCALCITONIN SERPL-MCNC: <0.02 NG/ML (ref 0–0.25)
PROT SERPL-MCNC: 6.3 G/DL (ref 6–8.5)
PROT UR QL STRIP: ABNORMAL
PROTHROMBIN TIME: 15.4 SECONDS (ref 11.7–14.2)
QT INTERVAL: 382 MS
RBC # BLD AUTO: 4.7 10*6/MM3 (ref 3.77–5.28)
RBC # UR STRIP: ABNORMAL /HPF
REF LAB TEST METHOD: ABNORMAL
RHINOVIRUS RNA SPEC NAA+PROBE: NOT DETECTED
RSV RNA NPH QL NAA+NON-PROBE: NOT DETECTED
SARS-COV-2 RNA NPH QL NAA+NON-PROBE: NOT DETECTED
SODIUM SERPL-SCNC: 145 MMOL/L (ref 136–145)
SP GR UR STRIP: 1.01 (ref 1–1.03)
SQUAMOUS #/AREA URNS HPF: ABNORMAL /HPF
TROPONIN T SERPL-MCNC: <0.01 NG/ML (ref 0–0.03)
UROBILINOGEN UR QL STRIP: ABNORMAL
WBC # UR STRIP: ABNORMAL /HPF
WBC NRBC COR # BLD: 6.66 10*3/MM3 (ref 3.4–10.8)

## 2022-07-26 PROCEDURE — 81001 URINALYSIS AUTO W/SCOPE: CPT | Performed by: EMERGENCY MEDICINE

## 2022-07-26 PROCEDURE — 83735 ASSAY OF MAGNESIUM: CPT | Performed by: EMERGENCY MEDICINE

## 2022-07-26 PROCEDURE — 99222 1ST HOSP IP/OBS MODERATE 55: CPT | Performed by: INTERNAL MEDICINE

## 2022-07-26 PROCEDURE — 71046 X-RAY EXAM CHEST 2 VIEWS: CPT

## 2022-07-26 PROCEDURE — 93005 ELECTROCARDIOGRAM TRACING: CPT

## 2022-07-26 PROCEDURE — 0202U NFCT DS 22 TRGT SARS-COV-2: CPT | Performed by: EMERGENCY MEDICINE

## 2022-07-26 PROCEDURE — 83880 ASSAY OF NATRIURETIC PEPTIDE: CPT | Performed by: EMERGENCY MEDICINE

## 2022-07-26 PROCEDURE — 84484 ASSAY OF TROPONIN QUANT: CPT | Performed by: EMERGENCY MEDICINE

## 2022-07-26 PROCEDURE — 85025 COMPLETE CBC W/AUTO DIFF WBC: CPT | Performed by: EMERGENCY MEDICINE

## 2022-07-26 PROCEDURE — 25010000002 FUROSEMIDE PER 20 MG: Performed by: EMERGENCY MEDICINE

## 2022-07-26 PROCEDURE — 84145 PROCALCITONIN (PCT): CPT | Performed by: EMERGENCY MEDICINE

## 2022-07-26 PROCEDURE — 99285 EMERGENCY DEPT VISIT HI MDM: CPT

## 2022-07-26 PROCEDURE — 85610 PROTHROMBIN TIME: CPT | Performed by: EMERGENCY MEDICINE

## 2022-07-26 PROCEDURE — 80053 COMPREHEN METABOLIC PANEL: CPT | Performed by: EMERGENCY MEDICINE

## 2022-07-26 PROCEDURE — 82962 GLUCOSE BLOOD TEST: CPT

## 2022-07-26 PROCEDURE — 25010000002 METHYLPREDNISOLONE PER 125 MG: Performed by: EMERGENCY MEDICINE

## 2022-07-26 RX ORDER — FUROSEMIDE 10 MG/ML
40 INJECTION INTRAMUSCULAR; INTRAVENOUS EVERY 12 HOURS
Status: DISCONTINUED | OUTPATIENT
Start: 2022-07-26 | End: 2022-07-27

## 2022-07-26 RX ORDER — LOSARTAN POTASSIUM 50 MG/1
50 TABLET ORAL DAILY
Status: DISCONTINUED | OUTPATIENT
Start: 2022-07-27 | End: 2022-07-29 | Stop reason: HOSPADM

## 2022-07-26 RX ORDER — ACETAMINOPHEN 325 MG/1
650 TABLET ORAL EVERY 4 HOURS PRN
Status: DISCONTINUED | OUTPATIENT
Start: 2022-07-26 | End: 2022-07-29 | Stop reason: HOSPADM

## 2022-07-26 RX ORDER — ACETAMINOPHEN 160 MG/5ML
650 SOLUTION ORAL EVERY 4 HOURS PRN
Status: DISCONTINUED | OUTPATIENT
Start: 2022-07-26 | End: 2022-07-29 | Stop reason: HOSPADM

## 2022-07-26 RX ORDER — CLONAZEPAM 1 MG/1
1 TABLET ORAL 4 TIMES DAILY PRN
Status: DISCONTINUED | OUTPATIENT
Start: 2022-07-26 | End: 2022-07-29 | Stop reason: HOSPADM

## 2022-07-26 RX ORDER — CARVEDILOL 12.5 MG/1
12.5 TABLET ORAL 2 TIMES DAILY
Status: DISCONTINUED | OUTPATIENT
Start: 2022-07-26 | End: 2022-07-29 | Stop reason: HOSPADM

## 2022-07-26 RX ORDER — SODIUM CHLORIDE 0.9 % (FLUSH) 0.9 %
10 SYRINGE (ML) INJECTION AS NEEDED
Status: DISCONTINUED | OUTPATIENT
Start: 2022-07-26 | End: 2022-07-29 | Stop reason: HOSPADM

## 2022-07-26 RX ORDER — QUETIAPINE FUMARATE 200 MG/1
200 TABLET, FILM COATED ORAL NIGHTLY
Status: DISCONTINUED | OUTPATIENT
Start: 2022-07-26 | End: 2022-07-29 | Stop reason: HOSPADM

## 2022-07-26 RX ORDER — INSULIN LISPRO 100 [IU]/ML
0-9 INJECTION, SOLUTION INTRAVENOUS; SUBCUTANEOUS
Status: DISCONTINUED | OUTPATIENT
Start: 2022-07-27 | End: 2022-07-28 | Stop reason: DRUGHIGH

## 2022-07-26 RX ORDER — TOPIRAMATE 100 MG/1
300 TABLET, FILM COATED ORAL NIGHTLY
Status: DISCONTINUED | OUTPATIENT
Start: 2022-07-26 | End: 2022-07-29 | Stop reason: HOSPADM

## 2022-07-26 RX ORDER — PANTOPRAZOLE SODIUM 40 MG/1
40 TABLET, DELAYED RELEASE ORAL EVERY MORNING
Status: DISCONTINUED | OUTPATIENT
Start: 2022-07-27 | End: 2022-07-29 | Stop reason: HOSPADM

## 2022-07-26 RX ORDER — FUROSEMIDE 10 MG/ML
80 INJECTION INTRAMUSCULAR; INTRAVENOUS ONCE
Status: COMPLETED | OUTPATIENT
Start: 2022-07-26 | End: 2022-07-26

## 2022-07-26 RX ORDER — ASPIRIN 81 MG/1
81 TABLET ORAL DAILY
Status: DISCONTINUED | OUTPATIENT
Start: 2022-07-27 | End: 2022-07-29 | Stop reason: HOSPADM

## 2022-07-26 RX ORDER — LEVOTHYROXINE SODIUM 175 UG/1
175 TABLET ORAL
Status: DISCONTINUED | OUTPATIENT
Start: 2022-07-27 | End: 2022-07-29 | Stop reason: HOSPADM

## 2022-07-26 RX ORDER — DEXTROSE MONOHYDRATE 25 G/50ML
25 INJECTION, SOLUTION INTRAVENOUS
Status: DISCONTINUED | OUTPATIENT
Start: 2022-07-26 | End: 2022-07-29 | Stop reason: HOSPADM

## 2022-07-26 RX ORDER — NICOTINE POLACRILEX 4 MG
15 LOZENGE BUCCAL
Status: DISCONTINUED | OUTPATIENT
Start: 2022-07-26 | End: 2022-07-28

## 2022-07-26 RX ORDER — TRAMADOL HYDROCHLORIDE 50 MG/1
50 TABLET ORAL EVERY 6 HOURS PRN
Status: DISCONTINUED | OUTPATIENT
Start: 2022-07-26 | End: 2022-07-29 | Stop reason: HOSPADM

## 2022-07-26 RX ORDER — ONDANSETRON 2 MG/ML
4 INJECTION INTRAMUSCULAR; INTRAVENOUS EVERY 6 HOURS PRN
Status: DISCONTINUED | OUTPATIENT
Start: 2022-07-26 | End: 2022-07-29 | Stop reason: HOSPADM

## 2022-07-26 RX ORDER — ACETAMINOPHEN 650 MG/1
650 SUPPOSITORY RECTAL EVERY 4 HOURS PRN
Status: DISCONTINUED | OUTPATIENT
Start: 2022-07-26 | End: 2022-07-29 | Stop reason: HOSPADM

## 2022-07-26 RX ORDER — METHYLPREDNISOLONE SODIUM SUCCINATE 125 MG/2ML
125 INJECTION, POWDER, LYOPHILIZED, FOR SOLUTION INTRAMUSCULAR; INTRAVENOUS ONCE
Status: COMPLETED | OUTPATIENT
Start: 2022-07-26 | End: 2022-07-26

## 2022-07-26 RX ADMIN — FUROSEMIDE 80 MG: 10 INJECTION, SOLUTION INTRAMUSCULAR; INTRAVENOUS at 15:54

## 2022-07-26 RX ADMIN — METOPROLOL TARTRATE 5 MG: 5 INJECTION INTRAVENOUS at 15:11

## 2022-07-26 RX ADMIN — QUETIAPINE FUMARATE 200 MG: 200 TABLET ORAL at 22:58

## 2022-07-26 RX ADMIN — METHYLPREDNISOLONE SODIUM SUCCINATE 125 MG: 125 INJECTION, POWDER, FOR SOLUTION INTRAMUSCULAR; INTRAVENOUS at 14:30

## 2022-07-26 RX ADMIN — CARVEDILOL 12.5 MG: 12.5 TABLET, FILM COATED ORAL at 22:58

## 2022-07-26 RX ADMIN — METOPROLOL TARTRATE 5 MG: 1 INJECTION, SOLUTION INTRAVENOUS at 14:28

## 2022-07-26 RX ADMIN — TOPIRAMATE 300 MG: 100 TABLET, FILM COATED ORAL at 23:18

## 2022-07-26 RX ADMIN — ACETAMINOPHEN 650 MG: 325 TABLET, FILM COATED ORAL at 22:52

## 2022-07-26 RX ADMIN — BUPROPION HYDROCHLORIDE 450 MG: 300 TABLET, EXTENDED RELEASE ORAL at 22:58

## 2022-07-26 RX ADMIN — SODIUM CHLORIDE 5 MG/HR: 9 INJECTION, SOLUTION INTRAVENOUS at 15:56

## 2022-07-26 RX ADMIN — METOPROLOL TARTRATE 25 MG: 25 TABLET, FILM COATED ORAL at 14:27

## 2022-07-27 ENCOUNTER — APPOINTMENT (OUTPATIENT)
Dept: CARDIOLOGY | Facility: HOSPITAL | Age: 57
End: 2022-07-27

## 2022-07-27 LAB
ANION GAP SERPL CALCULATED.3IONS-SCNC: 11 MMOL/L (ref 5–15)
BASOPHILS # BLD AUTO: 0.02 10*3/MM3 (ref 0–0.2)
BASOPHILS NFR BLD AUTO: 0.2 % (ref 0–1.5)
BH CV ECHO MEAS - ACS: 1.58 CM
BH CV ECHO MEAS - AI P1/2T: 511.6 MSEC
BH CV ECHO MEAS - AO MAX PG: 11.9 MMHG
BH CV ECHO MEAS - AO MEAN PG: 5.5 MMHG
BH CV ECHO MEAS - AO ROOT DIAM: 2.9 CM
BH CV ECHO MEAS - AO V2 MAX: 172.5 CM/SEC
BH CV ECHO MEAS - AO V2 VTI: 34.7 CM
BH CV ECHO MEAS - AVA(I,D): 1.92 CM2
BH CV ECHO MEAS - EDV(CUBED): 135.9 ML
BH CV ECHO MEAS - EDV(MOD-SP2): 188 ML
BH CV ECHO MEAS - EDV(MOD-SP4): 155 ML
BH CV ECHO MEAS - EF(MOD-BP): 52 %
BH CV ECHO MEAS - EF(MOD-SP2): 55.3 %
BH CV ECHO MEAS - EF(MOD-SP4): 50.3 %
BH CV ECHO MEAS - ESV(CUBED): 56.7 ML
BH CV ECHO MEAS - ESV(MOD-SP2): 84 ML
BH CV ECHO MEAS - ESV(MOD-SP4): 77 ML
BH CV ECHO MEAS - FS: 25.3 %
BH CV ECHO MEAS - IVS/LVPW: 1.01 CM
BH CV ECHO MEAS - IVSD: 1.43 CM
BH CV ECHO MEAS - LV DIASTOLIC VOL/BSA (35-75): 76.2 CM2
BH CV ECHO MEAS - LV MASS(C)D: 313.9 GRAMS
BH CV ECHO MEAS - LV MAX PG: 4.7 MMHG
BH CV ECHO MEAS - LV MEAN PG: 2.7 MMHG
BH CV ECHO MEAS - LV SYSTOLIC VOL/BSA (12-30): 37.9 CM2
BH CV ECHO MEAS - LV V1 MAX: 108 CM/SEC
BH CV ECHO MEAS - LV V1 VTI: 26.4 CM
BH CV ECHO MEAS - LVIDD: 5.1 CM
BH CV ECHO MEAS - LVIDS: 3.8 CM
BH CV ECHO MEAS - LVOT AREA: 2.5 CM2
BH CV ECHO MEAS - LVOT DIAM: 1.79 CM
BH CV ECHO MEAS - LVPWD: 1.43 CM
BH CV ECHO MEAS - MR MAX PG: 69.5 MMHG
BH CV ECHO MEAS - MR MAX VEL: 408.1 CM/SEC
BH CV ECHO MEAS - MV A DUR: 0.15 SEC
BH CV ECHO MEAS - MV A MAX VEL: 96 CM/SEC
BH CV ECHO MEAS - MV DEC SLOPE: 761.5 CM/SEC2
BH CV ECHO MEAS - MV DEC TIME: 415 MSEC
BH CV ECHO MEAS - MV E MAX VEL: 150.6 CM/SEC
BH CV ECHO MEAS - MV E/A: 1.57
BH CV ECHO MEAS - MV MEAN PG: 14 MMHG
BH CV ECHO MEAS - MV P1/2T: 113.8 MSEC
BH CV ECHO MEAS - MV V2 VTI: 82.3 CM
BH CV ECHO MEAS - MVA(P1/2T): 1.93 CM2
BH CV ECHO MEAS - MVA(VTI): 0.81 CM2
BH CV ECHO MEAS - PA ACC SLOPE: 11.7 CM/SEC2
BH CV ECHO MEAS - PA ACC TIME: 0.12 SEC
BH CV ECHO MEAS - PA PR(ACCEL): 26.7 MMHG
BH CV ECHO MEAS - PA V2 MAX: 96.7 CM/SEC
BH CV ECHO MEAS - PULM A REVS DUR: 0.2 SEC
BH CV ECHO MEAS - PULM A REVS VEL: 35.9 CM/SEC
BH CV ECHO MEAS - PULM DIAS VEL: 43.3 CM/SEC
BH CV ECHO MEAS - PULM S/D: 1.35
BH CV ECHO MEAS - PULM SYS VEL: 58.4 CM/SEC
BH CV ECHO MEAS - QP/QS: 1.84
BH CV ECHO MEAS - RV MAX PG: 3.7 MMHG
BH CV ECHO MEAS - RV V1 MAX: 96.3 CM/SEC
BH CV ECHO MEAS - RV V1 VTI: 20.7 CM
BH CV ECHO MEAS - RVOT DIAM: 2.7 CM
BH CV ECHO MEAS - SI(MOD-SP2): 51.1 ML/M2
BH CV ECHO MEAS - SI(MOD-SP4): 38.3 ML/M2
BH CV ECHO MEAS - SV(LVOT): 66.6 ML
BH CV ECHO MEAS - SV(MOD-SP2): 104 ML
BH CV ECHO MEAS - SV(MOD-SP4): 78 ML
BH CV ECHO MEAS - SV(RVOT): 122.4 ML
BH CV ECHO MEAS - TAPSE (>1.6): 1.2 CM
BH CV ECHO MEAS - TR MAX PG: 32.2 MMHG
BH CV ECHO MEAS - TR MAX VEL: 283.5 CM/SEC
BH CV XLRA - RV BASE: 3.8 CM
BH CV XLRA - RV LENGTH: 7.9 CM
BH CV XLRA - RV MID: 3.7 CM
BUN SERPL-MCNC: 19 MG/DL (ref 6–20)
BUN/CREAT SERPL: 15.2 (ref 7–25)
CALCIUM SPEC-SCNC: 8.8 MG/DL (ref 8.6–10.5)
CHLORIDE SERPL-SCNC: 99 MMOL/L (ref 98–107)
CO2 SERPL-SCNC: 29 MMOL/L (ref 22–29)
CREAT SERPL-MCNC: 1.25 MG/DL (ref 0.57–1)
DEPRECATED RDW RBC AUTO: 44.3 FL (ref 37–54)
EGFRCR SERPLBLD CKD-EPI 2021: 51 ML/MIN/1.73
EOSINOPHIL # BLD AUTO: 0 10*3/MM3 (ref 0–0.4)
EOSINOPHIL NFR BLD AUTO: 0 % (ref 0.3–6.2)
ERYTHROCYTE [DISTWIDTH] IN BLOOD BY AUTOMATED COUNT: 12.6 % (ref 12.3–15.4)
GLUCOSE BLDC GLUCOMTR-MCNC: 323 MG/DL (ref 70–130)
GLUCOSE BLDC GLUCOMTR-MCNC: 347 MG/DL (ref 70–130)
GLUCOSE SERPL-MCNC: 320 MG/DL (ref 65–99)
HBA1C MFR BLD: 6.8 % (ref 4.8–5.6)
HCT VFR BLD AUTO: 44.9 % (ref 34–46.6)
HGB BLD-MCNC: 15 G/DL (ref 12–15.9)
IMM GRANULOCYTES # BLD AUTO: 0.04 10*3/MM3 (ref 0–0.05)
IMM GRANULOCYTES NFR BLD AUTO: 0.4 % (ref 0–0.5)
LEFT ATRIUM VOLUME INDEX: 43.8 ML/M2
LYMPHOCYTES # BLD AUTO: 1.04 10*3/MM3 (ref 0.7–3.1)
LYMPHOCYTES NFR BLD AUTO: 9.9 % (ref 19.6–45.3)
MAXIMAL PREDICTED HEART RATE: 165 BPM
MCH RBC QN AUTO: 32.3 PG (ref 26.6–33)
MCHC RBC AUTO-ENTMCNC: 33.4 G/DL (ref 31.5–35.7)
MCV RBC AUTO: 96.6 FL (ref 79–97)
MONOCYTES # BLD AUTO: 0.13 10*3/MM3 (ref 0.1–0.9)
MONOCYTES NFR BLD AUTO: 1.2 % (ref 5–12)
NEUTROPHILS NFR BLD AUTO: 88.3 % (ref 42.7–76)
NEUTROPHILS NFR BLD AUTO: 9.27 10*3/MM3 (ref 1.7–7)
NRBC BLD AUTO-RTO: 0 /100 WBC (ref 0–0.2)
PLATELET # BLD AUTO: 125 10*3/MM3 (ref 140–450)
PMV BLD AUTO: 13 FL (ref 6–12)
POTASSIUM SERPL-SCNC: 4.4 MMOL/L (ref 3.5–5.2)
RBC # BLD AUTO: 4.65 10*6/MM3 (ref 3.77–5.28)
SODIUM SERPL-SCNC: 139 MMOL/L (ref 136–145)
STRESS TARGET HR: 140 BPM
WBC NRBC COR # BLD: 10.5 10*3/MM3 (ref 3.4–10.8)

## 2022-07-27 PROCEDURE — 25010000002 PERFLUTREN (DEFINITY) 8.476 MG IN SODIUM CHLORIDE (PF) 0.9 % 10 ML INJECTION: Performed by: INTERNAL MEDICINE

## 2022-07-27 PROCEDURE — 93306 TTE W/DOPPLER COMPLETE: CPT | Performed by: INTERNAL MEDICINE

## 2022-07-27 PROCEDURE — 83036 HEMOGLOBIN GLYCOSYLATED A1C: CPT | Performed by: INTERNAL MEDICINE

## 2022-07-27 PROCEDURE — 85025 COMPLETE CBC W/AUTO DIFF WBC: CPT | Performed by: INTERNAL MEDICINE

## 2022-07-27 PROCEDURE — 99232 SBSQ HOSP IP/OBS MODERATE 35: CPT | Performed by: INTERNAL MEDICINE

## 2022-07-27 PROCEDURE — 80048 BASIC METABOLIC PNL TOTAL CA: CPT | Performed by: INTERNAL MEDICINE

## 2022-07-27 PROCEDURE — 63710000001 INSULIN LISPRO (HUMAN) PER 5 UNITS: Performed by: INTERNAL MEDICINE

## 2022-07-27 PROCEDURE — 93306 TTE W/DOPPLER COMPLETE: CPT

## 2022-07-27 PROCEDURE — 25010000002 FUROSEMIDE PER 20 MG: Performed by: INTERNAL MEDICINE

## 2022-07-27 PROCEDURE — 82962 GLUCOSE BLOOD TEST: CPT

## 2022-07-27 PROCEDURE — 36415 COLL VENOUS BLD VENIPUNCTURE: CPT | Performed by: INTERNAL MEDICINE

## 2022-07-27 RX ORDER — NITROGLYCERIN 0.4 MG/1
0.4 TABLET SUBLINGUAL
Status: DISCONTINUED | OUTPATIENT
Start: 2022-07-27 | End: 2022-07-29 | Stop reason: HOSPADM

## 2022-07-27 RX ORDER — TORSEMIDE 20 MG/1
20 TABLET ORAL DAILY
Status: DISCONTINUED | OUTPATIENT
Start: 2022-07-27 | End: 2022-07-28

## 2022-07-27 RX ADMIN — CARVEDILOL 12.5 MG: 12.5 TABLET, FILM COATED ORAL at 20:36

## 2022-07-27 RX ADMIN — SODIUM CHLORIDE 2.5 MG/HR: 9 INJECTION, SOLUTION INTRAVENOUS at 03:48

## 2022-07-27 RX ADMIN — INSULIN LISPRO 7 UNITS: 100 INJECTION, SOLUTION INTRAVENOUS; SUBCUTANEOUS at 06:30

## 2022-07-27 RX ADMIN — LOSARTAN POTASSIUM 50 MG: 50 TABLET, FILM COATED ORAL at 08:49

## 2022-07-27 RX ADMIN — INSULIN LISPRO 4 UNITS: 100 INJECTION, SOLUTION INTRAVENOUS; SUBCUTANEOUS at 16:36

## 2022-07-27 RX ADMIN — BUPROPION HYDROCHLORIDE 450 MG: 300 TABLET, EXTENDED RELEASE ORAL at 20:36

## 2022-07-27 RX ADMIN — ASPIRIN 81 MG: 81 TABLET, COATED ORAL at 08:49

## 2022-07-27 RX ADMIN — INSULIN LISPRO 4 UNITS: 100 INJECTION, SOLUTION INTRAVENOUS; SUBCUTANEOUS at 13:14

## 2022-07-27 RX ADMIN — TOPIRAMATE 300 MG: 100 TABLET, FILM COATED ORAL at 20:36

## 2022-07-27 RX ADMIN — PERFLUTREN 3 ML: 6.52 INJECTION, SUSPENSION INTRAVENOUS at 12:37

## 2022-07-27 RX ADMIN — PANTOPRAZOLE SODIUM 40 MG: 40 TABLET, DELAYED RELEASE ORAL at 06:27

## 2022-07-27 RX ADMIN — CARVEDILOL 12.5 MG: 12.5 TABLET, FILM COATED ORAL at 08:49

## 2022-07-27 RX ADMIN — TORSEMIDE 20 MG: 20 TABLET ORAL at 16:36

## 2022-07-27 RX ADMIN — FUROSEMIDE 40 MG: 10 INJECTION, SOLUTION INTRAMUSCULAR; INTRAVENOUS at 04:53

## 2022-07-27 RX ADMIN — LEVOTHYROXINE SODIUM 175 MCG: 0.17 TABLET ORAL at 06:26

## 2022-07-27 RX ADMIN — QUETIAPINE FUMARATE 200 MG: 200 TABLET ORAL at 20:36

## 2022-07-28 ENCOUNTER — APPOINTMENT (OUTPATIENT)
Dept: CARDIOLOGY | Facility: HOSPITAL | Age: 57
End: 2022-07-28

## 2022-07-28 LAB
ANION GAP SERPL CALCULATED.3IONS-SCNC: 7.7 MMOL/L (ref 5–15)
BH CV ECHO MEAS - MR MAX PG: 80.5 MMHG
BH CV ECHO MEAS - MR MAX VEL: 448.6 CM/SEC
BH CV ECHO MEAS - MV MAX PG: 18.9 MMHG
BH CV ECHO MEAS - MV MEAN PG: 8.8 MMHG
BH CV ECHO MEAS - MV V2 VTI: 78.3 CM
BH CV ECHO MEAS - RVSP: 33 MMHG
BH CV ECHO MEAS - TR MAX PG: 29.8 MMHG
BH CV ECHO MEAS - TR MAX VEL: 273.1 CM/SEC
BUN SERPL-MCNC: 23 MG/DL (ref 6–20)
BUN/CREAT SERPL: 18 (ref 7–25)
CALCIUM SPEC-SCNC: 8.5 MG/DL (ref 8.6–10.5)
CHLORIDE SERPL-SCNC: 100 MMOL/L (ref 98–107)
CO2 SERPL-SCNC: 32.3 MMOL/L (ref 22–29)
CREAT SERPL-MCNC: 1.28 MG/DL (ref 0.57–1)
DEPRECATED RDW RBC AUTO: 43.9 FL (ref 37–54)
EGFRCR SERPLBLD CKD-EPI 2021: 49.6 ML/MIN/1.73
ERYTHROCYTE [DISTWIDTH] IN BLOOD BY AUTOMATED COUNT: 12.5 % (ref 12.3–15.4)
GLUCOSE BLDC GLUCOMTR-MCNC: 314 MG/DL (ref 70–130)
GLUCOSE BLDC GLUCOMTR-MCNC: 453 MG/DL (ref 70–130)
GLUCOSE BLDC GLUCOMTR-MCNC: 73 MG/DL (ref 70–130)
GLUCOSE SERPL-MCNC: 368 MG/DL (ref 65–99)
HCT VFR BLD AUTO: 43.8 % (ref 34–46.6)
HGB BLD-MCNC: 14.3 G/DL (ref 12–15.9)
MAXIMAL PREDICTED HEART RATE: 165 BPM
MCH RBC QN AUTO: 31.8 PG (ref 26.6–33)
MCHC RBC AUTO-ENTMCNC: 32.6 G/DL (ref 31.5–35.7)
MCV RBC AUTO: 97.6 FL (ref 79–97)
PLATELET # BLD AUTO: 116 10*3/MM3 (ref 140–450)
PMV BLD AUTO: 12.4 FL (ref 6–12)
POTASSIUM SERPL-SCNC: 4.1 MMOL/L (ref 3.5–5.2)
RBC # BLD AUTO: 4.49 10*6/MM3 (ref 3.77–5.28)
SODIUM SERPL-SCNC: 140 MMOL/L (ref 136–145)
STRESS TARGET HR: 140 BPM
TSH SERPL DL<=0.05 MIU/L-ACNC: 0.57 UIU/ML (ref 0.27–4.2)
WBC NRBC COR # BLD: 7.3 10*3/MM3 (ref 3.4–10.8)

## 2022-07-28 PROCEDURE — 93320 DOPPLER ECHO COMPLETE: CPT

## 2022-07-28 PROCEDURE — 93325 DOPPLER ECHO COLOR FLOW MAPG: CPT

## 2022-07-28 PROCEDURE — 99232 SBSQ HOSP IP/OBS MODERATE 35: CPT | Performed by: INTERNAL MEDICINE

## 2022-07-28 PROCEDURE — 99153 MOD SED SAME PHYS/QHP EA: CPT

## 2022-07-28 PROCEDURE — 93312 ECHO TRANSESOPHAGEAL: CPT

## 2022-07-28 PROCEDURE — 25010000002 MIDAZOLAM PER 1 MG: Performed by: INTERNAL MEDICINE

## 2022-07-28 PROCEDURE — 63710000001 INSULIN LISPRO (HUMAN) PER 5 UNITS: Performed by: INTERNAL MEDICINE

## 2022-07-28 PROCEDURE — 80048 BASIC METABOLIC PNL TOTAL CA: CPT | Performed by: INTERNAL MEDICINE

## 2022-07-28 PROCEDURE — 84443 ASSAY THYROID STIM HORMONE: CPT | Performed by: INTERNAL MEDICINE

## 2022-07-28 PROCEDURE — 93325 DOPPLER ECHO COLOR FLOW MAPG: CPT | Performed by: INTERNAL MEDICINE

## 2022-07-28 PROCEDURE — 99222 1ST HOSP IP/OBS MODERATE 55: CPT | Performed by: THORACIC SURGERY (CARDIOTHORACIC VASCULAR SURGERY)

## 2022-07-28 PROCEDURE — 85027 COMPLETE CBC AUTOMATED: CPT | Performed by: INTERNAL MEDICINE

## 2022-07-28 PROCEDURE — 82962 GLUCOSE BLOOD TEST: CPT

## 2022-07-28 PROCEDURE — 25010000002 FUROSEMIDE PER 20 MG: Performed by: INTERNAL MEDICINE

## 2022-07-28 PROCEDURE — 25010000002 FENTANYL CITRATE (PF) 50 MCG/ML SOLUTION: Performed by: INTERNAL MEDICINE

## 2022-07-28 PROCEDURE — 93312 ECHO TRANSESOPHAGEAL: CPT | Performed by: INTERNAL MEDICINE

## 2022-07-28 PROCEDURE — 93320 DOPPLER ECHO COMPLETE: CPT | Performed by: INTERNAL MEDICINE

## 2022-07-28 PROCEDURE — 99152 MOD SED SAME PHYS/QHP 5/>YRS: CPT

## 2022-07-28 RX ORDER — HYDRALAZINE HYDROCHLORIDE 25 MG/1
25 TABLET, FILM COATED ORAL EVERY 12 HOURS SCHEDULED
Status: DISCONTINUED | OUTPATIENT
Start: 2022-07-28 | End: 2022-07-29

## 2022-07-28 RX ORDER — INSULIN LISPRO 100 [IU]/ML
0-24 INJECTION, SOLUTION INTRAVENOUS; SUBCUTANEOUS
Status: DISCONTINUED | OUTPATIENT
Start: 2022-07-28 | End: 2022-07-29 | Stop reason: HOSPADM

## 2022-07-28 RX ORDER — SODIUM CHLORIDE 9 MG/ML
INJECTION, SOLUTION INTRAVENOUS
Status: COMPLETED | OUTPATIENT
Start: 2022-07-28 | End: 2022-07-28

## 2022-07-28 RX ORDER — DEXTROSE MONOHYDRATE 25 G/50ML
25 INJECTION, SOLUTION INTRAVENOUS
Status: DISCONTINUED | OUTPATIENT
Start: 2022-07-28 | End: 2022-07-29 | Stop reason: HOSPADM

## 2022-07-28 RX ORDER — MIDAZOLAM HYDROCHLORIDE 1 MG/ML
INJECTION INTRAMUSCULAR; INTRAVENOUS
Status: COMPLETED | OUTPATIENT
Start: 2022-07-28 | End: 2022-07-28

## 2022-07-28 RX ORDER — FENTANYL CITRATE 50 UG/ML
INJECTION, SOLUTION INTRAMUSCULAR; INTRAVENOUS
Status: COMPLETED | OUTPATIENT
Start: 2022-07-28 | End: 2022-07-28

## 2022-07-28 RX ORDER — FUROSEMIDE 10 MG/ML
40 INJECTION INTRAMUSCULAR; INTRAVENOUS EVERY 8 HOURS
Status: COMPLETED | OUTPATIENT
Start: 2022-07-28 | End: 2022-07-29

## 2022-07-28 RX ORDER — NICOTINE POLACRILEX 4 MG
15 LOZENGE BUCCAL
Status: DISCONTINUED | OUTPATIENT
Start: 2022-07-28 | End: 2022-07-29 | Stop reason: HOSPADM

## 2022-07-28 RX ORDER — LIDOCAINE HYDROCHLORIDE 20 MG/ML
SOLUTION OROPHARYNGEAL
Status: COMPLETED | OUTPATIENT
Start: 2022-07-28 | End: 2022-07-28

## 2022-07-28 RX ADMIN — INSULIN LISPRO 6 UNITS: 100 INJECTION, SOLUTION INTRAVENOUS; SUBCUTANEOUS at 11:53

## 2022-07-28 RX ADMIN — INSULIN LISPRO 16 UNITS: 100 INJECTION, SOLUTION INTRAVENOUS; SUBCUTANEOUS at 18:10

## 2022-07-28 RX ADMIN — MIDAZOLAM 2 MG: 1 INJECTION INTRAMUSCULAR; INTRAVENOUS at 08:58

## 2022-07-28 RX ADMIN — CARVEDILOL 12.5 MG: 12.5 TABLET, FILM COATED ORAL at 20:22

## 2022-07-28 RX ADMIN — SODIUM CHLORIDE 50 ML/HR: 9 INJECTION, SOLUTION INTRAVENOUS at 08:40

## 2022-07-28 RX ADMIN — FENTANYL CITRATE 25 MCG: 50 INJECTION INTRAMUSCULAR; INTRAVENOUS at 08:51

## 2022-07-28 RX ADMIN — PANTOPRAZOLE SODIUM 40 MG: 40 TABLET, DELAYED RELEASE ORAL at 06:34

## 2022-07-28 RX ADMIN — FENTANYL CITRATE 25 MCG: 50 INJECTION INTRAMUSCULAR; INTRAVENOUS at 08:56

## 2022-07-28 RX ADMIN — INSULIN LISPRO 8 UNITS: 100 INJECTION, SOLUTION INTRAVENOUS; SUBCUTANEOUS at 11:26

## 2022-07-28 RX ADMIN — LEVOTHYROXINE SODIUM 175 MCG: 0.17 TABLET ORAL at 06:35

## 2022-07-28 RX ADMIN — HYDRALAZINE HYDROCHLORIDE 25 MG: 25 TABLET, FILM COATED ORAL at 20:21

## 2022-07-28 RX ADMIN — MIDAZOLAM 2 MG: 1 INJECTION INTRAMUSCULAR; INTRAVENOUS at 08:55

## 2022-07-28 RX ADMIN — QUETIAPINE FUMARATE 200 MG: 200 TABLET ORAL at 20:22

## 2022-07-28 RX ADMIN — MIDAZOLAM 2 MG: 1 INJECTION INTRAMUSCULAR; INTRAVENOUS at 08:50

## 2022-07-28 RX ADMIN — FUROSEMIDE 40 MG: 10 INJECTION, SOLUTION INTRAMUSCULAR; INTRAVENOUS at 10:45

## 2022-07-28 RX ADMIN — BUPROPION HYDROCHLORIDE 450 MG: 300 TABLET, EXTENDED RELEASE ORAL at 20:21

## 2022-07-28 RX ADMIN — LIDOCAINE HYDROCHLORIDE 10 ML: 20 SOLUTION ORAL; TOPICAL at 08:39

## 2022-07-28 RX ADMIN — FENTANYL CITRATE 25 MCG: 50 INJECTION INTRAMUSCULAR; INTRAVENOUS at 08:52

## 2022-07-28 RX ADMIN — MIDAZOLAM 2 MG: 1 INJECTION INTRAMUSCULAR; INTRAVENOUS at 08:52

## 2022-07-28 RX ADMIN — TOPIRAMATE 300 MG: 100 TABLET, FILM COATED ORAL at 20:21

## 2022-07-28 RX ADMIN — ASPIRIN 81 MG: 81 TABLET, COATED ORAL at 10:42

## 2022-07-28 RX ADMIN — INSULIN LISPRO 9 UNITS: 100 INJECTION, SOLUTION INTRAVENOUS; SUBCUTANEOUS at 16:37

## 2022-07-28 RX ADMIN — CARVEDILOL 12.5 MG: 12.5 TABLET, FILM COATED ORAL at 10:44

## 2022-07-28 RX ADMIN — LOSARTAN POTASSIUM 50 MG: 50 TABLET, FILM COATED ORAL at 10:45

## 2022-07-28 RX ADMIN — FUROSEMIDE 40 MG: 10 INJECTION, SOLUTION INTRAMUSCULAR; INTRAVENOUS at 18:03

## 2022-07-29 ENCOUNTER — READMISSION MANAGEMENT (OUTPATIENT)
Dept: CALL CENTER | Facility: HOSPITAL | Age: 57
End: 2022-07-29

## 2022-07-29 VITALS
HEART RATE: 68 BPM | RESPIRATION RATE: 18 BRPM | DIASTOLIC BLOOD PRESSURE: 88 MMHG | WEIGHT: 207.7 LBS | HEIGHT: 65 IN | SYSTOLIC BLOOD PRESSURE: 153 MMHG | TEMPERATURE: 98.1 F | BODY MASS INDEX: 34.6 KG/M2 | OXYGEN SATURATION: 96 %

## 2022-07-29 DIAGNOSIS — I34.0 MITRAL VALVE INSUFFICIENCY, UNSPECIFIED ETIOLOGY: Primary | ICD-10-CM

## 2022-07-29 LAB
ALBUMIN SERPL-MCNC: 3.8 G/DL (ref 3.5–5.2)
ANION GAP SERPL CALCULATED.3IONS-SCNC: 11 MMOL/L (ref 5–15)
BUN SERPL-MCNC: 28 MG/DL (ref 6–20)
BUN/CREAT SERPL: 20.6 (ref 7–25)
CALCIUM SPEC-SCNC: 8.9 MG/DL (ref 8.6–10.5)
CHLORIDE SERPL-SCNC: 100 MMOL/L (ref 98–107)
CO2 SERPL-SCNC: 30 MMOL/L (ref 22–29)
CREAT SERPL-MCNC: 1.36 MG/DL (ref 0.57–1)
EGFRCR SERPLBLD CKD-EPI 2021: 46.1 ML/MIN/1.73
GLUCOSE BLDC GLUCOMTR-MCNC: 162 MG/DL (ref 70–130)
GLUCOSE BLDC GLUCOMTR-MCNC: 292 MG/DL (ref 70–130)
GLUCOSE BLDC GLUCOMTR-MCNC: 349 MG/DL (ref 70–130)
GLUCOSE SERPL-MCNC: 335 MG/DL (ref 65–99)
PHOSPHATE SERPL-MCNC: 4.4 MG/DL (ref 2.5–4.5)
POTASSIUM SERPL-SCNC: 3.7 MMOL/L (ref 3.5–5.2)
SODIUM SERPL-SCNC: 141 MMOL/L (ref 136–145)

## 2022-07-29 PROCEDURE — 99232 SBSQ HOSP IP/OBS MODERATE 35: CPT | Performed by: NURSE PRACTITIONER

## 2022-07-29 PROCEDURE — 63710000001 INSULIN LISPRO (HUMAN) PER 5 UNITS: Performed by: INTERNAL MEDICINE

## 2022-07-29 PROCEDURE — 82962 GLUCOSE BLOOD TEST: CPT

## 2022-07-29 PROCEDURE — 80069 RENAL FUNCTION PANEL: CPT | Performed by: INTERNAL MEDICINE

## 2022-07-29 PROCEDURE — 94618 PULMONARY STRESS TESTING: CPT

## 2022-07-29 PROCEDURE — 25010000002 FUROSEMIDE PER 20 MG: Performed by: INTERNAL MEDICINE

## 2022-07-29 RX ORDER — HYDRALAZINE HYDROCHLORIDE 25 MG/1
25 TABLET, FILM COATED ORAL EVERY 8 HOURS SCHEDULED
Status: DISCONTINUED | OUTPATIENT
Start: 2022-07-29 | End: 2022-07-29 | Stop reason: HOSPADM

## 2022-07-29 RX ORDER — CARVEDILOL 12.5 MG/1
12.5 TABLET ORAL 2 TIMES DAILY
Qty: 60 TABLET | Refills: 0 | Status: SHIPPED | OUTPATIENT
Start: 2022-07-29 | End: 2022-08-22 | Stop reason: SDUPTHER

## 2022-07-29 RX ORDER — TORSEMIDE 20 MG/1
40 TABLET ORAL DAILY
Status: DISCONTINUED | OUTPATIENT
Start: 2022-07-29 | End: 2022-07-29 | Stop reason: HOSPADM

## 2022-07-29 RX ORDER — HYDRALAZINE HYDROCHLORIDE 25 MG/1
25 TABLET, FILM COATED ORAL EVERY 8 HOURS SCHEDULED
Qty: 90 TABLET | Refills: 0 | Status: SHIPPED | OUTPATIENT
Start: 2022-07-29 | End: 2022-08-22 | Stop reason: SDUPTHER

## 2022-07-29 RX ORDER — LOSARTAN POTASSIUM 50 MG/1
50 TABLET ORAL DAILY
Qty: 30 TABLET | Refills: 0 | Status: SHIPPED | OUTPATIENT
Start: 2022-07-30 | End: 2022-08-22 | Stop reason: SDUPTHER

## 2022-07-29 RX ADMIN — CARVEDILOL 12.5 MG: 12.5 TABLET, FILM COATED ORAL at 08:28

## 2022-07-29 RX ADMIN — FUROSEMIDE 40 MG: 10 INJECTION, SOLUTION INTRAMUSCULAR; INTRAVENOUS at 03:00

## 2022-07-29 RX ADMIN — LOSARTAN POTASSIUM 50 MG: 50 TABLET, FILM COATED ORAL at 08:28

## 2022-07-29 RX ADMIN — HYDRALAZINE HYDROCHLORIDE 25 MG: 25 TABLET, FILM COATED ORAL at 08:28

## 2022-07-29 RX ADMIN — LEVOTHYROXINE SODIUM 175 MCG: 0.17 TABLET ORAL at 06:06

## 2022-07-29 RX ADMIN — INSULIN GLARGINE-YFGN 10 UNITS: 100 INJECTION, SOLUTION SUBCUTANEOUS at 02:57

## 2022-07-29 RX ADMIN — INSULIN LISPRO 12 UNITS: 100 INJECTION, SOLUTION INTRAVENOUS; SUBCUTANEOUS at 07:00

## 2022-07-29 RX ADMIN — PANTOPRAZOLE SODIUM 40 MG: 40 TABLET, DELAYED RELEASE ORAL at 06:06

## 2022-07-29 RX ADMIN — ACETAMINOPHEN 650 MG: 325 TABLET, FILM COATED ORAL at 06:06

## 2022-07-29 RX ADMIN — INSULIN LISPRO 4 UNITS: 100 INJECTION, SOLUTION INTRAVENOUS; SUBCUTANEOUS at 12:03

## 2022-07-29 RX ADMIN — ASPIRIN 81 MG: 81 TABLET, COATED ORAL at 08:28

## 2022-07-29 RX ADMIN — TORSEMIDE 40 MG: 20 TABLET ORAL at 12:03

## 2022-07-30 NOTE — OUTREACH NOTE
Prep Survey    Flowsheet Row Responses   Rel[facility] facility patient discharged from? [facility]   Is LACE score < 7 ? No   Emergency Room discharge w/ pulse ox? No   Eligibility Readm Mgmt   Discharge diagnosis Hypertensive urgency,    Acute on chronic systolic congestive heart failure   Does the patient have one of the following disease processes/diagnoses(primary or secondary)? CHF   Does the patient have Home health ordered? No   Is there a DME ordered? Yes   What DME was ordered? [place] [facility] to provide oxygen   Prep survey completed? Yes          DEVIN CONNELLY - Registered Nurse

## 2022-08-03 ENCOUNTER — READMISSION MANAGEMENT (OUTPATIENT)
Dept: CALL CENTER | Facility: HOSPITAL | Age: 57
End: 2022-08-03

## 2022-08-03 NOTE — OUTREACH NOTE
CHF Week 1 Survey    Flowsheet Row Responses   [facility] patient discharged from? [place]   Does the patient have one of the following disease processes/diagnoses(primary or secondary)? CHF   CHF Week 1 attempt successful? Yes   Call start time 1146   Call end time 1148   Discharge diagnosis Hypertensive urgency,    Acute on chronic systolic congestive heart failure   Meds reviewed with patient/caregiver? Yes   Is the patient having any side effects they believe may be caused by any medication additions or changes? No   Does the patient have all medications ordered at discharge? Yes   Is the patient taking all medications as directed (includes completed medication regime)? Yes   Does the patient have a primary care provider?  Yes   Does the patient have an appointment with their PCP within 7 days of discharge? Yes   Has the patient kept scheduled appointments due by today? N/A   What DME was ordered? [facility] to provide oxygen   Has all DME been delivered? Yes   DME comments does not wear often   Pulse Ox monitoring None   Comments Denies SOA/edema, has HA   Did the patient receive a copy of their discharge instructions? Yes   Nursing interventions Reviewed instructions with patient   What is the patient's perception of their health status since discharge? Returned to baseline/stable   Nursing interventions Nurse provided patient education   Is the patient weighing daily? Yes   Does the patient have scales? Yes   Daily weight interventions Education provided on importance of daily weight   Is the patient able to teach back Heart Failure diet management? Yes    CHF Week 1 call completed? Yes   Revoked No further contact(revokes)-requires comment   Is the patient interested in additional calls from an ambulatory ?  NOTE:  applies to high risk patients requiring additional follow-up. No   Graduated/Revoked comments fritz [facility] - Registered Nurse

## 2022-08-08 ENCOUNTER — TELEPHONE (OUTPATIENT)
Dept: CARDIOLOGY | Facility: CLINIC | Age: 57
End: 2022-08-08

## 2022-08-08 NOTE — TELEPHONE ENCOUNTER
Please have her take an extra torsemide today or tomorrow morning, and see if this helps.  Thanks     ALETHA

## 2022-08-08 NOTE — TELEPHONE ENCOUNTER
I called spoke with pt and gave her instructions on medication.   She will call with update after increase medication for 2 days.  Kyaw beltre

## 2022-08-08 NOTE — TELEPHONE ENCOUNTER
08/08/22   Pt called  She was told to call office if she had a 2 lb weight gain in a day or 5 lbs in a week.   She is calling because she states she has had a little under 5 lb  weight gain. She states she is not having any symptoms.No chest pain , no Soa and she states she does not look swollen.  Please advise    Pt's call back# [phone]  Thanks Kyaw GERBER

## 2022-08-15 ENCOUNTER — TRANSCRIBE ORDERS (OUTPATIENT)
Dept: ADMINISTRATIVE | Facility: HOSPITAL | Age: 57
End: 2022-08-15

## 2022-08-15 DIAGNOSIS — I65.29 OCCLUSION OF CAROTID ARTERY, UNSPECIFIED LATERALITY: ICD-10-CM

## 2022-08-15 DIAGNOSIS — Z12.31 VISIT FOR SCREENING MAMMOGRAM: Primary | ICD-10-CM

## 2022-08-15 DIAGNOSIS — Z78.0 POSTMENOPAUSAL: ICD-10-CM

## 2022-08-22 RX ORDER — LOSARTAN POTASSIUM 50 MG/1
50 TABLET ORAL DAILY
Qty: 30 TABLET | Refills: 0 | Status: SHIPPED | OUTPATIENT
Start: 2022-08-22

## 2022-08-22 RX ORDER — HYDRALAZINE HYDROCHLORIDE 25 MG/1
25 TABLET, FILM COATED ORAL EVERY 8 HOURS SCHEDULED
Qty: 90 TABLET | Refills: 0 | Status: SHIPPED | OUTPATIENT
Start: 2022-08-22

## 2022-08-22 RX ORDER — CARVEDILOL 12.5 MG/1
12.5 TABLET ORAL 2 TIMES DAILY
Qty: 60 TABLET | Refills: 0 | Status: SHIPPED | OUTPATIENT
Start: 2022-08-22

## 2022-08-22 NOTE — TELEPHONE ENCOUNTER
Pt called for refills.   She missed appt last wee due to being at another Dr Appt that went over.   She needs Hydralazine,Losartan, Torsemide and Carvedilol.   #30.  Kyaw GERBER

## 2022-08-29 ENCOUNTER — HOSPITAL ENCOUNTER (OUTPATIENT)
Dept: BONE DENSITY | Facility: HOSPITAL | Age: 57
End: 2022-08-29

## 2022-08-31 ENCOUNTER — OFFICE VISIT (OUTPATIENT)
Dept: CARDIOLOGY | Facility: CLINIC | Age: 57
End: 2022-08-31

## 2022-08-31 VITALS
OXYGEN SATURATION: 94 % | DIASTOLIC BLOOD PRESSURE: 80 MMHG | BODY MASS INDEX: 37.05 KG/M2 | HEIGHT: 65 IN | SYSTOLIC BLOOD PRESSURE: 122 MMHG | HEART RATE: 63 BPM | WEIGHT: 222.4 LBS

## 2022-08-31 DIAGNOSIS — I10 PRIMARY HYPERTENSION: ICD-10-CM

## 2022-08-31 DIAGNOSIS — Z98.890 STATUS POST MITRAL VALVE REPAIR: ICD-10-CM

## 2022-08-31 DIAGNOSIS — I25.5 ISCHEMIC CARDIOMYOPATHY: ICD-10-CM

## 2022-08-31 DIAGNOSIS — I25.10 CORONARY ARTERY DISEASE INVOLVING NATIVE CORONARY ARTERY OF NATIVE HEART WITHOUT ANGINA PECTORIS: Primary | ICD-10-CM

## 2022-08-31 PROCEDURE — 99214 OFFICE O/P EST MOD 30 MIN: CPT | Performed by: NURSE PRACTITIONER

## 2022-08-31 RX ORDER — EVOLOCUMAB 140 MG/ML
INJECTION, SOLUTION SUBCUTANEOUS
COMMUNITY

## 2022-08-31 RX ORDER — LEVALBUTEROL TARTRATE 45 UG/1
1-2 AEROSOL, METERED ORAL AS NEEDED
COMMUNITY
Start: 2022-08-10

## 2022-08-31 RX ORDER — SARILUMAB 200 MG/1.14ML
INJECTION, SOLUTION SUBCUTANEOUS
COMMUNITY
Start: 2022-08-09

## 2022-08-31 RX ORDER — CALCIUM/MAGNESIUM/ZINC 333-133 MG
1 TABLET ORAL 3 TIMES DAILY
COMMUNITY

## 2022-08-31 RX ORDER — GLUCAGON INJECTION, SOLUTION 1 MG/.2ML
INJECTION, SOLUTION SUBCUTANEOUS
COMMUNITY
Start: 2022-03-28

## 2022-08-31 RX ORDER — LEVALBUTEROL INHALATION SOLUTION 0.63 MG/3ML
SOLUTION RESPIRATORY (INHALATION)
COMMUNITY
Start: 2022-08-15

## 2022-08-31 RX ORDER — GAUZE BANDAGE 4" X 4"
BANDAGE TOPICAL 4 TIMES DAILY
COMMUNITY

## 2022-08-31 RX ORDER — MULTIVIT WITH MINERALS/LUTEIN
1000 TABLET ORAL
COMMUNITY

## 2022-08-31 RX ORDER — LEVOTHYROXINE SODIUM 200 MCG
TABLET ORAL
COMMUNITY
Start: 2022-08-23 | End: 2022-10-31

## 2022-08-31 NOTE — PROGRESS NOTES
"    CARDIOLOGY        Patient Name: Angeles Oquendo  :1965  Age: 57 y.o.  Primary Cardiologist: Chino James MD  Encounter Provider:  KALYANI Qiu    Date of Service: 21          CHIEF COMPLAINT / REASON FOR OFFICE VISIT     Congestive Heart Failure (BHE 1 month f/u)      HISTORY OF PRESENT ILLNESS       Congestive Heart Failure  Pertinent negatives include no shortness of breath.     Angeles Oquendo is a 57 y.o. female who presents today for hospital reevaluation    Pt has a  history significant for CAD, cardiomyopathy, mitral valve regurgitation, hyperlipidemia, bipolar disorder, rheumatoid arthritis, statin intolerance (has tried atorvastatin and rosuvastatin).    Review of medical records reveals patient hospitalized with discharge date 2022.  Patient presented with shortness of breath and cough.  O2 sats were in the 80s when she was originally presented to the ED.  She also had elevated blood pressures and was placed on Lopressor as well as a Cardene drip.  Patient had echocardiogram which revealed LVEF 41% and severely increased gradient across the mitral valve annuloplasty ring.  She subsequently had LAURO which revealed mitral valve disease.  CT surgery was consulted recommend follow-up echocardiogram in 6 to 8 weeks and reevaluation in their clinic.  At time of discharge patient's breathing was stable.  Echocardiogram and follow-up with CT surgery have been scheduled.    Patient reports that she has felt better since discharge from the hospital.  She reports that her dyspnea is more controlled.  She has noted some intermittent and brief heart palpitations as well as \"chest twinges\"  None of these symptoms sustain.  She reports that since increasing torsemide her volume status has been controlled.  She has routine laboratory studies scheduled for tomorrow that were ordered by her nephrologist and endocrinologist.      The following portions of the patient's history were " "reviewed and updated as appropriate: allergies, current medications, past family history, past medical history, past social history, past surgical history and problem list.      VITAL SIGNS     Visit Vitals  /80 (BP Location: Left arm, Patient Position: Sitting, Cuff Size: Large Adult)   Pulse 63   Ht 165.1 cm (65\")   Wt 101 kg (222 lb 6.4 oz)   SpO2 94%   BMI 37.01 kg/m²         Wt Readings from Last 3 Encounters:   08/31/22 101 kg (222 lb 6.4 oz)   07/29/22 94.2 kg (207 lb 11.2 oz)   11/01/21 101 kg (223 lb)     Body mass index is 37.01 kg/m².      REVIEW OF SYSTEMS   Review of Systems   Constitutional: Positive for malaise/fatigue. Negative for chills, fever, weight gain and weight loss.   Cardiovascular: Negative for leg swelling.   Respiratory: Negative for cough, shortness of breath, snoring and wheezing.    Hematologic/Lymphatic: Negative for bleeding problem. Does not bruise/bleed easily.   Skin: Negative for color change.   Musculoskeletal: Negative for falls, joint pain and myalgias.   Gastrointestinal: Negative for melena.   Genitourinary: Negative for hematuria.   Neurological: Negative for excessive daytime sleepiness.   Psychiatric/Behavioral: Negative for depression. The patient is not nervous/anxious.            PHYSICAL EXAMINATION     Vitals and nursing note reviewed.   Constitutional:       Appearance: Normal appearance. Well-developed.   Eyes:      Conjunctiva/sclera: Conjunctivae normal.   Neck:      Vascular: No carotid bruit.   Pulmonary:      Breath sounds: Wheezing present.   Cardiovascular:      Normal rate. Regular rhythm. Normal S1 with normal intensity. Normal S2 with normal intensity.      Murmurs: There is no murmur.      No gallop. No click. No rub.   Edema:     Peripheral edema absent.   Musculoskeletal: Normal range of motion. Skin:     General: Skin is warm and dry.   Neurological:      Mental Status: Alert and oriented to person, place, and time.      GCS: GCS eye subscore " is 4. GCS verbal subscore is 5. GCS motor subscore is 6.   Psychiatric:         Speech: Speech normal.         Behavior: Behavior normal.         Thought Content: Thought content normal.         Judgment: Judgment normal.           REVIEWED DATA     Procedures    Cardiac Procedures:  1. Left heart catheterization on 6/29/2018: The left main was normal.  The LAD had 75-80% mid disease.  There was a large diagonal with 20% proximal disease and 30-40% distal disease.  The left circumflex had 50% mid disease.  The RCA was occluded with recanalization and collaterals from the left.  2. Status post four-vessel CABG (LIMA-LAD, SVG-D2, and sequential SVG-PDA and PLV) and complex mitral valve repair by Dr. Tobias on 7/19/2018.  The mitral valve repair consists of a 26 mm Physio II ring annuloplasty and posteromedial commissuroplasty.  3. Echocardiogram on 7/23/2018: There was moderate hypokinesis of the basal to mid inferior wall and severe hypokinesis of the basal to mid inferoseptum.  The ejection fraction was 45%.  The left atrium was moderately dilated.  There was mild aortic insufficiency.  The mitral valve repair appeared to be intact with elevated gradients (peak gradient 17 mmHg and mean gradient 8 mmHg).  There was trace mitral regurgitation.  There was mild tricuspid regurgitation.  The capillary RVSP was 40 mmHg.  4. Echocardiogram 9/29/2020.  LVEF 43%.  LV cavity is mildly dilated.  Mild LVH.  Normal RV cavity size and systolic function.  LAE.  Mild aortic valve regurgitation.  Mitral valve ring present and gradients across the mitral valve repair are normal.  No evidence of pericardial effusion.  5. 24-hour monitor 10/7/2020.  PVCs occurred frequently.  6. Echocardiogram 7/27/2022.  LVEF 41-45%.  Moderate hypertrophy.  RV cavity is mildly dilated.  Annuloplasty ring in mitral valve position gradients across the readings are severely elevated with peak 36 mmHg and mean 14 mmHg.  Mitral valve regurgitation is  difficult to quantify from the study.  7. LAURO 7/28/2022.  LVEF 41-45%.  RV cavity is mildly dilated with normal RV systolic function.  Small to moderate PFO on agitated study.  Mitral valve is status postrepair with annuloplasty ring.  Peak gradient 18 mmHg, mean gradient 8.8 mmHg.  There is an eccentric anterior jet of mitral valve regurgitation.  When patient was sedated mitral valve was moderate when patient systolic blood pressure was in the 120s mitral valve regurgitation was severe.             ASSESSMENT & PLAN     Diagnoses and all orders for this visit:    1. Coronary artery disease involving native coronary artery of native heart without angina pectoris (Primary)  · History of four-vessel CABG  · Currently denies angina or worsening dyspnea  · Continue GDMT with carvedilol, losartan, Repatha, aspirin.    2. Ischemic cardiomyopathy  · Denies dyspnea or orthopnea.  · Reports improvement of volume status with torsemide 40 mg in the morning and 20 mg in the evening.  We will get repeat BMP drawn tomorrow as already ordered by her nephrologist    3. Primary hypertension  · BP more controlled at 122/80  · Continue carvedilol, hydralazine, losartan, torsemide    4. Status post mitral valve repair  · Severe mitral valve regurgitation 3 mitral valve repair on LAURO during hospitalization.  Volume status currently more controlled, blood pressure more normotensive.  · Repeat echocardiogram as scheduled 9/19  · Keep previously scheduled appointment with CT surgery for further arrangements  · Call the office for worsening symptoms          MEDICATIONS         Discharge Medications          Accurate as of August 31, 2022 10:09 AM. If you have any questions, ask your nurse or doctor.            Changes to Medications      Instructions Start Date   Torsemide 40 MG tablet  What changed: additional instructions   40 mg, Oral, Daily         Continue These Medications      Instructions Start Date   ascorbic acid 1000 MG  tablet  Commonly known as: VITAMIN C   1,000 mg, Oral      aspirin 81 MG EC tablet   81 mg, Oral, Daily      buPROPion  MG 24 hr tablet  Commonly known as: WELLBUTRIN XL   450 mg, Oral, Daily      Calcium-Magnesium-Zinc 333-133-8.3 MG tablet   1 tablet, Oral, 3 Times Daily      carvedilol 12.5 MG tablet  Commonly known as: COREG   12.5 mg, Oral, 2 Times Daily      clonazePAM 1 MG tablet  Commonly known as: KlonoPIN   1 mg, Oral, 4 Times Daily PRN      Dulera 100-5 MCG/ACT inhaler  Generic drug: mometasone-formoterol   No dose, route, or frequency recorded.      fexofenadine 180 MG tablet  Commonly known as: ALLEGRA   180 mg, Oral, Daily      Fish Oil 435 MG capsule   Oral, 4 Times Daily      Gvoke PFS 1 MG/0.2ML solution prefilled syringe  Generic drug: Glucagon   INJECT 1 EACH UNDER THE SKIN IF NEEDED(FAMILY TO USE ON PATIENT FOR SEVERE HYPROGLYCEMAI).      hydrALAZINE 25 MG tablet  Commonly known as: APRESOLINE   25 mg, Oral, Every 8 Hours Scheduled      insulin lispro 100 UNIT/ML injection  Commonly known as: humaLOG   Subcutaneous, 2 UNITS ON 4 CARBS BASAL RATE OF 3 PER HOUR. VIA INSULIN PUMP      Kevzara 200 MG/1.14ML solution auto-injector  Generic drug: Sarilumab   No dose, route, or frequency recorded.      levalbuterol 0.63 MG/3ML nebulizer solution  Commonly known as: XOPENEX   USE 3 ML VIA NEBULIZER EVERY 8 HOURS AS NEEDED      levalbuterol 45 MCG/ACT inhaler  Commonly known as: XOPENEX HFA   1-2 puffs, Inhalation, As Needed, every 4 to 6 hours      levothyroxine 175 MCG tablet  Commonly known as: SYNTHROID, LEVOTHROID   175 mcg, Oral, Daily      Synthroid 200 MCG tablet  Generic drug: levothyroxine   No dose, route, or frequency recorded.      losartan 50 MG tablet  Commonly known as: COZAAR   50 mg, Oral, Daily      omeprazole 40 MG capsule  Commonly known as: priLOSEC   40 mg, Oral, Daily PRN      QUEtiapine 300 MG tablet  Commonly known as: SEROquel   200 mg, Oral, Nightly      Repatha solution  prefilled syringe injection  Generic drug: Evolocumab   Subcutaneous      [employer] misc   Does not apply, Every 30 Days      topiramate 100 MG tablet  Commonly known as: TOPAMAX   300 mg, Oral, Nightly      traMADol 50 MG tablet  Commonly known as: ULTRAM   50 mg, Every 6 Hours PRN      VITAMIN D2 PO   1 capsule, Oral, Weekly, Takes q sunday      vitamin E 1000 UNIT capsule   1,000 Units, Oral      Vortioxetine HBr 5 MG tablet   10 mg, Oral, Daily With Breakfast                 **Dragon Disclaimer:   Much of this encounter note is an electronic transcription/translation of spoken language to printed text. The electronic translation of spoken language may permit erroneous, or at times, nonsensical words or phrases to be inadvertently transcribed. Although I have reviewed the note for such errors, some may still exist.

## 2022-09-08 ENCOUNTER — TELEPHONE (OUTPATIENT)
Dept: CARDIOLOGY | Facility: CLINIC | Age: 57
End: 2022-09-08

## 2022-09-08 NOTE — TELEPHONE ENCOUNTER
I would like for her to double her diuretic the next 2 days and see if this alleviates the fluid.  Thanks     ALETHA

## 2022-09-08 NOTE — TELEPHONE ENCOUNTER
09/8/22   Pt called she has had a 5.5 lb weight gain again.   She was instructed to call office with 5 lb weight gain.   Please advise   Pt's call back # [phone]    Thanks Kyaw GERBER

## 2022-09-08 NOTE — TELEPHONE ENCOUNTER
I called spoke with pt and she will double her Torsemide starting tomorrow. She will keep us update with how it worked.   Kyaw GERBER

## 2022-09-19 ENCOUNTER — HOSPITAL ENCOUNTER (OUTPATIENT)
Dept: CARDIOLOGY | Facility: HOSPITAL | Age: 57
Discharge: HOME | End: 2022-09-19
Admitting: NURSE PRACTITIONER

## 2022-09-19 VITALS
DIASTOLIC BLOOD PRESSURE: 73 MMHG | HEIGHT: 65 IN | SYSTOLIC BLOOD PRESSURE: 118 MMHG | HEART RATE: 60 BPM | BODY MASS INDEX: 36.99 KG/M2 | WEIGHT: 222 LBS | OXYGEN SATURATION: 100 %

## 2022-09-19 DIAGNOSIS — I34.0 MITRAL VALVE INSUFFICIENCY, UNSPECIFIED ETIOLOGY: ICD-10-CM

## 2022-09-19 LAB
AORTIC ARCH: 2.2 CM
ASCENDING AORTA: 3.1 CM
BH CV ECHO MEAS - ACS: 1.79 CM
BH CV ECHO MEAS - AO MAX PG: 10.1 MMHG
BH CV ECHO MEAS - AO MEAN PG: 4.9 MMHG
BH CV ECHO MEAS - AO ROOT DIAM: 3.1 CM
BH CV ECHO MEAS - AO V2 MAX: 159 CM/SEC
BH CV ECHO MEAS - AO V2 VTI: 29.1 CM
BH CV ECHO MEAS - AVA(I,D): 1.55 CM2
BH CV ECHO MEAS - EDV(CUBED): 151.5 ML
BH CV ECHO MEAS - EDV(MOD-SP2): 123 ML
BH CV ECHO MEAS - EDV(MOD-SP4): 165 ML
BH CV ECHO MEAS - EF(MOD-BP): 44 %
BH CV ECHO MEAS - EF(MOD-SP2): 43.1 %
BH CV ECHO MEAS - EF(MOD-SP4): 44.8 %
BH CV ECHO MEAS - ESV(CUBED): 51.8 ML
BH CV ECHO MEAS - ESV(MOD-SP2): 70 ML
BH CV ECHO MEAS - ESV(MOD-SP4): 91 ML
BH CV ECHO MEAS - FS: 30.1 %
BH CV ECHO MEAS - IVS/LVPW: 0.96 CM
BH CV ECHO MEAS - IVSD: 0.96 CM
BH CV ECHO MEAS - LV DIASTOLIC VOL/BSA (35-75): 79.8 CM2
BH CV ECHO MEAS - LV MASS(C)D: 197 GRAMS
BH CV ECHO MEAS - LV MAX PG: 3.7 MMHG
BH CV ECHO MEAS - LV MEAN PG: 2.07 MMHG
BH CV ECHO MEAS - LV SYSTOLIC VOL/BSA (12-30): 44 CM2
BH CV ECHO MEAS - LV V1 MAX: 96.7 CM/SEC
BH CV ECHO MEAS - LV V1 VTI: 19.4 CM
BH CV ECHO MEAS - LVIDD: 5.3 CM
BH CV ECHO MEAS - LVIDS: 3.7 CM
BH CV ECHO MEAS - LVOT AREA: 2.31 CM2
BH CV ECHO MEAS - LVOT DIAM: 1.72 CM
BH CV ECHO MEAS - LVPWD: 1 CM
BH CV ECHO MEAS - MV A DUR: 0.12 SEC
BH CV ECHO MEAS - MV A MAX VEL: 164.5 CM/SEC
BH CV ECHO MEAS - MV DEC SLOPE: 372 CM/SEC2
BH CV ECHO MEAS - MV DEC TIME: 0.55 MSEC
BH CV ECHO MEAS - MV E MAX VEL: 183.7 CM/SEC
BH CV ECHO MEAS - MV E/A: 1.12
BH CV ECHO MEAS - MV MEAN PG: 7.8 MMHG
BH CV ECHO MEAS - MV P1/2T: 149.1 MSEC
BH CV ECHO MEAS - MV V2 VTI: 74.1 CM
BH CV ECHO MEAS - MVA(P1/2T): 1.48 CM2
BH CV ECHO MEAS - MVA(VTI): 0.61 CM2
BH CV ECHO MEAS - PA ACC SLOPE: 2.5 CM/SEC2
BH CV ECHO MEAS - PA ACC TIME: 0.19 SEC
BH CV ECHO MEAS - PA PR(ACCEL): -8.1 MMHG
BH CV ECHO MEAS - PA V2 MAX: 103.2 CM/SEC
BH CV ECHO MEAS - PULM A REVS DUR: 0.13 SEC
BH CV ECHO MEAS - PULM A REVS VEL: 22.5 CM/SEC
BH CV ECHO MEAS - PULM DIAS VEL: 59.3 CM/SEC
BH CV ECHO MEAS - PULM S/D: 1.03
BH CV ECHO MEAS - PULM SYS VEL: 61.1 CM/SEC
BH CV ECHO MEAS - QP/QS: 1.24
BH CV ECHO MEAS - RAP SYSTOLE: 3 MMHG
BH CV ECHO MEAS - RV MAX PG: 3.2 MMHG
BH CV ECHO MEAS - RV V1 MAX: 89.8 CM/SEC
BH CV ECHO MEAS - RV V1 VTI: 23 CM
BH CV ECHO MEAS - RVOT DIAM: 1.75 CM
BH CV ECHO MEAS - RVSP: 31 MMHG
BH CV ECHO MEAS - SI(MOD-SP2): 25.6 ML/M2
BH CV ECHO MEAS - SI(MOD-SP4): 35.8 ML/M2
BH CV ECHO MEAS - SUP REN AO DIAM: 2.2 CM
BH CV ECHO MEAS - SV(LVOT): 45 ML
BH CV ECHO MEAS - SV(MOD-SP2): 53 ML
BH CV ECHO MEAS - SV(MOD-SP4): 74 ML
BH CV ECHO MEAS - SV(RVOT): 55.6 ML
BH CV ECHO MEAS - TR MAX PG: 29.2 MMHG
BH CV ECHO MEAS - TR MAX VEL: 270.1 CM/SEC
BH CV XLRA - RV BASE: 4.2 CM
BH CV XLRA - RV LENGTH: 6.5 CM
BH CV XLRA - RV MID: 3.4 CM
LEFT ATRIUM VOLUME INDEX: 33.8 ML/M2
MAXIMAL PREDICTED HEART RATE: 165 BPM
STRESS TARGET HR: 140 BPM

## 2022-09-19 PROCEDURE — 25010000002 PERFLUTREN (DEFINITY) 8.476 MG IN SODIUM CHLORIDE (PF) 0.9 % 10 ML INJECTION: Performed by: NURSE PRACTITIONER

## 2022-09-19 PROCEDURE — 93306 TTE W/DOPPLER COMPLETE: CPT | Performed by: INTERNAL MEDICINE

## 2022-09-19 PROCEDURE — 93306 TTE W/DOPPLER COMPLETE: CPT

## 2022-09-19 RX ADMIN — SODIUM CHLORIDE 2 ML: 9 INJECTION INTRAMUSCULAR; INTRAVENOUS; SUBCUTANEOUS at 09:50

## 2022-10-31 ENCOUNTER — OFFICE VISIT (OUTPATIENT)
Dept: CARDIAC SURGERY | Facility: CLINIC | Age: 57
End: 2022-10-31

## 2022-10-31 VITALS
BODY MASS INDEX: 37.22 KG/M2 | SYSTOLIC BLOOD PRESSURE: 127 MMHG | DIASTOLIC BLOOD PRESSURE: 80 MMHG | OXYGEN SATURATION: 94 % | HEART RATE: 62 BPM | WEIGHT: 218 LBS | RESPIRATION RATE: 20 BRPM | TEMPERATURE: 97.1 F | HEIGHT: 64 IN

## 2022-10-31 DIAGNOSIS — I50.43 ACUTE ON CHRONIC COMBINED SYSTOLIC AND DIASTOLIC CONGESTIVE HEART FAILURE: ICD-10-CM

## 2022-10-31 DIAGNOSIS — I10 PRIMARY HYPERTENSION: ICD-10-CM

## 2022-10-31 DIAGNOSIS — I05.1 RHEUMATIC MITRAL REGURGITATION: Primary | ICD-10-CM

## 2022-10-31 PROCEDURE — 99024 POSTOP FOLLOW-UP VISIT: CPT | Performed by: THORACIC SURGERY (CARDIOTHORACIC VASCULAR SURGERY)

## 2022-10-31 NOTE — PROGRESS NOTES
[facility] note  I saw Mrs. Oquendo in my office in follow-up for her mitral regurgitation.  I saw her in the hospital in last July when she came with congestive heart failure, low ejection fraction and hypertension.  She had a 2D echo [facility] that showed moderate mitral regurgitation but also read as severe regurgitation.  She has a previous mitral valve repair without a restrictive ring.  She was treated medically and she is doing much better.  Blood pressure is better controlled.  She had a repeat echocardiogram that showed an ejection fraction of 40% with no MR and questionable mild to moderate mitral stenosis.  Based on the size of the ring I will doubt that there is significant mitral stenosis.  Overall she is doing much better.  I recommend continue medical treatment to optimize hemodynamics and prevent functional MR.  See her in our surgical office as needed.

## 2023-05-25 ENCOUNTER — TELEPHONE (OUTPATIENT)
Dept: CARDIOLOGY | Facility: CLINIC | Age: 58
End: 2023-05-25
Payer: MEDICARE

## 2023-05-25 NOTE — TELEPHONE ENCOUNTER
It would probably be just is easy to put her with a follow-up with me and then I can help transition with whoever makes sense as we talked through things.

## 2023-05-25 NOTE — TELEPHONE ENCOUNTER
DELETE AFTER REVIEWING: Telephone encounter to be sent to the clinical pool     Caller: Angeles Oquendo    Relationship to patient: Self    Best call back number: [phone]    Chief complaint: PATIENT WAS SEEN LAST FALL AND WAS TO ESTABLISH WITH A NEW DOCTOR SINCE [facility] IS ONLY WORKING IN ED. PATIENT SAYS SHE NEVER GOT ESTABLISHED WITH A NEW PROVIDER    Type of visit: FOLLOW UP    Requested date: ASAP         Additional notes:PATIENT HAS NOT BEEN SEEN SINCE 2022

## 2023-05-25 NOTE — TELEPHONE ENCOUNTER
Scheduling, please see above message. Please schedule patient for f/u with ISAURO at her convenience. // HG

## 2023-09-19 ENCOUNTER — OFFICE VISIT (OUTPATIENT)
Dept: CARDIOLOGY | Facility: CLINIC | Age: 58
End: 2023-09-19
Payer: MEDICARE

## 2023-09-19 VITALS
WEIGHT: 222 LBS | SYSTOLIC BLOOD PRESSURE: 124 MMHG | BODY MASS INDEX: 37.9 KG/M2 | HEIGHT: 64 IN | DIASTOLIC BLOOD PRESSURE: 82 MMHG | HEART RATE: 64 BPM

## 2023-09-19 DIAGNOSIS — I25.5 ISCHEMIC CARDIOMYOPATHY: ICD-10-CM

## 2023-09-19 DIAGNOSIS — Z98.890 STATUS POST MITRAL VALVE REPAIR: ICD-10-CM

## 2023-09-19 DIAGNOSIS — I10 PRIMARY HYPERTENSION: ICD-10-CM

## 2023-09-19 DIAGNOSIS — I25.10 CORONARY ARTERY DISEASE INVOLVING NATIVE CORONARY ARTERY OF NATIVE HEART WITHOUT ANGINA PECTORIS: Primary | ICD-10-CM

## 2023-09-19 DIAGNOSIS — I34.0 MITRAL VALVE INSUFFICIENCY, UNSPECIFIED ETIOLOGY: ICD-10-CM

## 2023-09-19 PROCEDURE — 99214 OFFICE O/P EST MOD 30 MIN: CPT | Performed by: NURSE PRACTITIONER

## 2023-09-19 PROCEDURE — 1160F RVW MEDS BY RX/DR IN RCRD: CPT | Performed by: NURSE PRACTITIONER

## 2023-09-19 PROCEDURE — 93000 ELECTROCARDIOGRAM COMPLETE: CPT | Performed by: NURSE PRACTITIONER

## 2023-09-19 PROCEDURE — 1159F MED LIST DOCD IN RCRD: CPT | Performed by: NURSE PRACTITIONER

## 2023-09-19 PROCEDURE — 3074F SYST BP LT 130 MM HG: CPT | Performed by: NURSE PRACTITIONER

## 2023-09-19 PROCEDURE — 3079F DIAST BP 80-89 MM HG: CPT | Performed by: NURSE PRACTITIONER

## 2023-09-19 RX ORDER — CARVEDILOL 12.5 MG/1
12.5 TABLET ORAL 2 TIMES DAILY
Qty: 180 TABLET | Refills: 3 | Status: SHIPPED | OUTPATIENT
Start: 2023-09-19

## 2023-09-19 RX ORDER — LOSARTAN POTASSIUM 50 MG/1
50 TABLET ORAL DAILY
Qty: 90 TABLET | Refills: 3 | Status: SHIPPED | OUTPATIENT
Start: 2023-09-19

## 2023-09-19 RX ORDER — HYDRALAZINE HYDROCHLORIDE 25 MG/1
25 TABLET, FILM COATED ORAL EVERY 8 HOURS SCHEDULED
Qty: 270 TABLET | Refills: 3 | Status: SHIPPED | OUTPATIENT
Start: 2023-09-19

## 2023-09-19 NOTE — PROGRESS NOTES
"    CARDIOLOGY        Patient Name: Angeles Oquendo  :1965  Age: 58 y.o.  Primary Cardiologist: Chino James MD  Encounter Provider:  KALYANI Qiu    Date of Service: 23      CHIEF COMPLAINT / REASON FOR OFFICE VISIT     Coronary Artery Disease      HISTORY OF PRESENT ILLNESS       Congestive Heart Failure  Pertinent negatives include no shortness of breath. Her past medical history is significant for CAD.   Coronary Artery Disease  Pertinent negatives include no leg swelling, shortness of breath or weight gain. Her past medical history is significant for CHF.       Angeles Oquendo is a 58 y.o. female who presents today for annual evaluation.    Pt has a  history significant for CAD, cardiomyopathy, mitral valve regurgitation, hyperlipidemia, bipolar disorder, rheumatoid arthritis, statin intolerance (has tried atorvastatin and rosuvastatin).    Patient reports today for annual evaluation.  She has been struggling with increased back pain and generalized fatigue.  She reports that she has been stable from a cardiovascular standpoint.  She denies chest pain, shortness of breath, palpitations, lightheadedness.  She does not PRETTY.  Denies volume overload.  She is tolerating all medications without adverse effects,     The following portions of the patient's history were reviewed and updated as appropriate: allergies, current medications, past family history, past medical history, past social history, past surgical history and problem list.      VITAL SIGNS     Visit Vitals  /82   Pulse 64   Ht 162.6 cm (64\")   Wt 101 kg (222 lb)   BMI 38.11 kg/m²         Wt Readings from Last 3 Encounters:   23 101 kg (222 lb)   10/31/22 98.9 kg (218 lb)   22 101 kg (222 lb)     Body mass index is 38.11 kg/m².      REVIEW OF SYSTEMS   Review of Systems   Constitutional: Positive for malaise/fatigue. Negative for chills, fever, weight gain and weight loss.   Cardiovascular:  Negative for leg " swelling.   Respiratory:  Negative for cough, shortness of breath, snoring and wheezing.    Hematologic/Lymphatic: Negative for bleeding problem. Does not bruise/bleed easily.   Skin:  Negative for color change.   Musculoskeletal:  Negative for falls, joint pain and myalgias.   Gastrointestinal:  Negative for melena.   Genitourinary:  Negative for hematuria.   Neurological:  Negative for excessive daytime sleepiness.   Psychiatric/Behavioral:  Negative for depression. The patient is not nervous/anxious.          PHYSICAL EXAMINATION     Vitals and nursing note reviewed.   Constitutional:       Appearance: Normal appearance. Well-developed.   Eyes:      Conjunctiva/sclera: Conjunctivae normal.   Neck:      Vascular: No carotid bruit.   Pulmonary:      Breath sounds: Wheezing present.   Cardiovascular:      Normal rate. Regular rhythm. Normal S1 with normal intensity. Normal S2 with normal intensity.       Murmurs: There is no murmur.      No gallop.  No click. No rub.   Edema:     Peripheral edema absent.   Musculoskeletal: Normal range of motion. Skin:     General: Skin is warm and dry.   Neurological:      Mental Status: Alert and oriented to person, place, and time.      GCS: GCS eye subscore is 4. GCS verbal subscore is 5. GCS motor subscore is 6.   Psychiatric:         Speech: Speech normal.         Behavior: Behavior normal.         Thought Content: Thought content normal.         Judgment: Judgment normal.         REVIEWED DATA       ECG 12 Lead    Date/Time: 9/19/2023 2:32 PM  Performed by: Ayesha Wright APRN  Authorized by: Ayesha Wright APRN   Comparison: compared with previous ECG from 7/26/2022  Rhythm: sinus rhythm  Rate: normal  BPM: 64  Conduction: conduction normal  ST Segments: ST segments normal  T Waves: T waves normal  QRS axis: normal    Clinical impression: normal ECG        Cardiac Procedures:  Left heart catheterization on 6/29/2018: The left main was normal.  The LAD had 75-80% mid  disease.  There was a large diagonal with 20% proximal disease and 30-40% distal disease.  The left circumflex had 50% mid disease.  The RCA was occluded with recanalization and collaterals from the left.  Status post four-vessel CABG (LIMA-LAD, SVG-D2, and sequential SVG-PDA and PLV) and complex mitral valve repair by Dr. Tobias on 7/19/2018.  The mitral valve repair consists of a 26 mm Physio II ring annuloplasty and posteromedial commissuroplasty.  Echocardiogram on 7/23/2018: There was moderate hypokinesis of the basal to mid inferior wall and severe hypokinesis of the basal to mid inferoseptum.  The ejection fraction was 45%.  The left atrium was moderately dilated.  There was mild aortic insufficiency.  The mitral valve repair appeared to be intact with elevated gradients (peak gradient 17 mmHg and mean gradient 8 mmHg).  There was trace mitral regurgitation.  There was mild tricuspid regurgitation.  The capillary RVSP was 40 mmHg.  Echocardiogram 9/29/2020.  LVEF 43%.  LV cavity is mildly dilated.  Mild LVH.  Normal RV cavity size and systolic function.  LAE.  Mild aortic valve regurgitation.  Mitral valve ring present and gradients across the mitral valve repair are normal.  No evidence of pericardial effusion.  24-hour monitor 10/7/2020.  PVCs occurred frequently.  Echocardiogram 7/27/2022.  LVEF 41-45%.  Moderate hypertrophy.  RV cavity is mildly dilated.  Annuloplasty ring in mitral valve position gradients across the readings are severely elevated with peak 36 mmHg and mean 14 mmHg.  Mitral valve regurgitation is difficult to quantify from the study.  LAURO 7/28/2022.  LVEF 41-45%.  RV cavity is mildly dilated with normal RV systolic function.  Small to moderate PFO on agitated study.  Mitral valve is status postrepair with annuloplasty ring.  Peak gradient 18 mmHg, mean gradient 8.8 mmHg.  There is an eccentric anterior jet of mitral valve regurgitation.  When patient was sedated mitral valve was moderate  when patient systolic blood pressure was in the 120s mitral valve regurgitation was severe.             ASSESSMENT & PLAN     Diagnoses and all orders for this visit:    1. Coronary artery disease involving native coronary artery of native heart without angina pectoris (Primary)  History of four-vessel CABG  Denies angina, dyspnea  Continue GDMT with carvedilol, losartan, Repatha, aspirin  -     ECG 12 Lead    2. Mitral valve insufficiency, unspecified etiology  3. Status post mitral valve repair  Currently denies dyspnea, volume overload, lightheadedness  Continue torsemide    4. Ischemic cardiomyopathy  Denies dyspnea, dyspnea with exertion, orthopnea, volume overload  Continue carvedilol, losartan, torsemide    5. Primary hypertension  BP controlled in clinic today  Continue carvedilol, hydralazine, losartan    Other orders  -     carvedilol (COREG) 12.5 MG tablet; Take 1 tablet by mouth 2 (Two) Times a Day.  Dispense: 180 tablet; Refill: 3  -     hydrALAZINE (APRESOLINE) 25 MG tablet; Take 1 tablet by mouth Every 8 (Eight) Hours.  Dispense: 270 tablet; Refill: 3  -     losartan (COZAAR) 50 MG tablet; Take 1 tablet by mouth Daily.  Dispense: 90 tablet; Refill: 3        Return in about 1 year (around 9/19/2024) for Dr. Jimenez-transitioning from Dr. James.    Future Appointments         Provider Department Center    9/23/2024 11:00 AM Silver Jimenez MD [facility] CARDIOLOGY [facility]                  MEDICATIONS         Discharge Medications            Accurate as of September 19, 2023  2:50 PM. If you have any questions, ask your nurse or doctor.                Changes to Medications        Instructions Start Date   Torsemide 40 MG tablet  What changed: additional instructions   40 mg, Oral, Daily             Continue These Medications        Instructions Start Date   ascorbic acid 1000 MG tablet  Commonly known as: VITAMIN C   1,000 mg, Oral      aspirin 81 MG EC tablet   81 mg, Oral, Daily       buPROPion  MG 24 hr tablet  Commonly known as: WELLBUTRIN XL   450 mg, Oral, Daily      Calcium-Magnesium-Zinc 333-133-8.3 MG tablet   1 tablet, Oral, 3 Times Daily      carvedilol 12.5 MG tablet  Commonly known as: COREG   12.5 mg, Oral, 2 Times Daily      clonazePAM 1 MG tablet  Commonly known as: KlonoPIN   1 mg, Oral, 4 Times Daily PRN      Dulera 100-5 MCG/ACT inhaler  Generic drug: mometasone-formoterol   No dose, route, or frequency recorded.      fexofenadine 180 MG tablet  Commonly known as: ALLEGRA   180 mg, Oral, Daily      Fish Oil 435 MG capsule   Oral, 4 Times Daily      Gvoke PFS 1 MG/0.2ML solution prefilled syringe  Generic drug: Glucagon   INJECT 1 EACH UNDER THE SKIN IF NEEDED(FAMILY TO USE ON PATIENT FOR SEVERE HYPROGLYCEMAI).      hydrALAZINE 25 MG tablet  Commonly known as: APRESOLINE   25 mg, Oral, Every 8 Hours Scheduled      insulin lispro 100 UNIT/ML injection  Commonly known as: humaLOG   Subcutaneous, 2 UNITS ON 4 CARBS BASAL RATE OF 3 PER HOUR. VIA INSULIN PUMP      Kevzara 200 MG/1.14ML solution auto-injector  Generic drug: Sarilumab   No dose, route, or frequency recorded.      levalbuterol 0.63 MG/3ML nebulizer solution  Commonly known as: XOPENEX   USE 3 ML VIA NEBULIZER EVERY 8 HOURS AS NEEDED      levalbuterol 45 MCG/ACT inhaler  Commonly known as: XOPENEX HFA   1-2 puffs, Inhalation, As Needed, every 4 to 6 hours      levothyroxine 175 MCG tablet  Commonly known as: SYNTHROID, LEVOTHROID   175 mcg, Oral, Daily      losartan 50 MG tablet  Commonly known as: COZAAR   50 mg, Oral, Daily      omeprazole 40 MG capsule  Commonly known as: priLOSEC   40 mg, Oral, Daily PRN      QUEtiapine 300 MG tablet  Commonly known as: SEROquel   200 mg, Oral, Nightly      Repatha solution prefilled syringe injection  Generic drug: Evolocumab   Subcutaneous      topiramate 100 MG tablet  Commonly known as: TOPAMAX   300 mg, Oral, Nightly      traMADol 50 MG tablet  Commonly known as: ULTRAM    50 mg, Every 6 Hours PRN      VITAMIN D2 PO   1 capsule, Oral, Weekly, Takes q sunday      vitamin E 1000 UNIT capsule   1,000 Units, Oral      Vortioxetine HBr 5 MG tablet tablet  Commonly known as: TRINTELLIX   10 mg, Oral, Daily With Breakfast                   **Dragon Disclaimer:   Much of this encounter note is an electronic transcription/translation of spoken language to printed text. The electronic translation of spoken language may permit erroneous, or at times, nonsensical words or phrases to be inadvertently transcribed. Although I have reviewed the note for such errors, some may still exist.

## 2023-10-09 RX ORDER — HYDRALAZINE HYDROCHLORIDE 25 MG/1
25 TABLET, FILM COATED ORAL EVERY 8 HOURS SCHEDULED
Qty: 270 TABLET | Refills: 0 | Status: SHIPPED | OUTPATIENT
Start: 2023-10-09

## 2023-11-10 ENCOUNTER — TRANSCRIBE ORDERS (OUTPATIENT)
Dept: ADMINISTRATIVE | Facility: HOSPITAL | Age: 58
End: 2023-11-10
Payer: MEDICARE

## 2023-11-10 DIAGNOSIS — I65.23 CAROTID STENOSIS, BILATERAL: Primary | ICD-10-CM

## 2023-11-14 ENCOUNTER — TELEPHONE (OUTPATIENT)
Dept: CARDIOLOGY | Facility: CLINIC | Age: 58
End: 2023-11-14
Payer: MEDICARE

## 2023-11-14 NOTE — TELEPHONE ENCOUNTER
Alexa with Dr. Johnson office called.     is wanting to know if she needs to have another echo and if she needs to see the heart failure clinic?     Her last echo was 7/2022    PCP#: [phone]

## 2023-11-14 NOTE — TELEPHONE ENCOUNTER
11/14/23  15:10 EST    Her symptoms were stable when I saw her in September.  LVEF has been stable since 2018.  I do not think that she needs to be seen by heart failure clinic or have another echocardiogram at this time.      KALYANI Schmidt  [facility]

## 2023-11-20 ENCOUNTER — TRANSCRIBE ORDERS (OUTPATIENT)
Dept: ADMINISTRATIVE | Facility: HOSPITAL | Age: 58
End: 2023-11-20
Payer: MEDICARE

## 2023-11-20 DIAGNOSIS — R94.30 ABNORMAL CARDIOVASCULAR FUNCTION STUDY: Primary | ICD-10-CM

## 2023-11-20 DIAGNOSIS — I51.9 HEART DISEASE: ICD-10-CM

## 2023-11-20 DIAGNOSIS — I51.7 CARDIOMEGALY: ICD-10-CM

## 2024-03-06 ENCOUNTER — OFFICE VISIT (OUTPATIENT)
Dept: SLEEP MEDICINE | Facility: HOSPITAL | Age: 59
End: 2024-03-06
Payer: MEDICARE

## 2024-03-06 VITALS — WEIGHT: 231 LBS | HEIGHT: 64 IN | OXYGEN SATURATION: 97 % | BODY MASS INDEX: 39.44 KG/M2 | HEART RATE: 63 BPM

## 2024-03-06 DIAGNOSIS — G47.21 CIRCADIAN RHYTHM SLEEP DISORDER, DELAYED SLEEP PHASE TYPE: ICD-10-CM

## 2024-03-06 DIAGNOSIS — G47.36 HYPOXEMIA ASSOCIATED WITH SLEEP: ICD-10-CM

## 2024-03-06 DIAGNOSIS — G47.33 OSA (OBSTRUCTIVE SLEEP APNEA): Primary | ICD-10-CM

## 2024-03-06 DIAGNOSIS — G47.14 HYPERSOMNIA DUE TO MEDICAL CONDITION: ICD-10-CM

## 2024-03-06 DIAGNOSIS — E66.01 CLASS 2 SEVERE OBESITY DUE TO EXCESS CALORIES WITH SERIOUS COMORBIDITY AND BODY MASS INDEX (BMI) OF 39.0 TO 39.9 IN ADULT: ICD-10-CM

## 2024-03-06 PROCEDURE — G0463 HOSPITAL OUTPT CLINIC VISIT: HCPCS

## 2024-03-06 NOTE — PROGRESS NOTES
[facility]ultation       Reason for Consultation: CARLTON    Patient Care Team:  Charlotte Johnson MD as PCP - Family Medicine  Jim Schafer MD as Consulting Physician (Rheumatology)  Hari Merritt MD (Inactive) as Consulting Physician (Pain Medicine)  Cesar Remy MD as Consulting Physician (Psychiatry)  Jesus James MD as Consulting Physician (Cardiology)  Rebecca Lange MD as Consulting Physician (Endocrinology)  Jason Burden MD as Consulting Physician (Sleep Medicine)    Chief complaint: CARLTON    History of present illness:    Thank you for asking me to see your patient.  The patient is a 58 y.o. female who I was asked to evaluate concerning her obstructive sleep apnea.  Home sleep study 10/4/2018, weight 207 pounds, demonstrated mild CARLTON with AHI 5 events per hour, 6.4 events per hour per respiratory event index.  Sleep-related hypoxia present for 8 minutes.  Auto CPAP prescribed at that time between 4 and 20 cm water pressure, factory settings.  The patient has some difficulty tolerating CPAP due to pressures.  She uses a fullface mask.  Her DME is [employer].    I reviewed office notes from Dr. Charlotte Johnson.    The patient goes to bed at 10 PM and her wake time varies.  She states she will sleep from 0 to 10 hours.  It takes her hours to fall asleep and she is tired upon arising.  She will take 1 or 2 naps daily.  She has complaints of hypersomnolence and her [place] Sleepiness Scale is abnormal at 11.  Without CPAP witnessed apnea noted.  She has morning headaches.  She has leg jerking at night.  If she is tired, she has more trouble with her legs and she needs to stand and stump to make them feel better.  She has nocturnal pain.  She has rheumatoid arthritis.  She grinds her teeth.  She has problems falling asleep and difficulty staying asleep and her sleep is generally restless.  She does not use the restroom at nighttime.    Review of  Systems:    A complete review of systems was done and all were negative with the exception of some nasal congestion, neck pain, TMJ pain, excessive thirst, and anxiety and depression    History:  Past Medical History:   Diagnosis Date    Abnormal ECG     Anesthesia complication     WITH LAURO TOOK A  LOT TO GET HER TO SLEEP    Asthma     Bipolar I disorder, single manic episode     Bursitis     LEFT ELBOW    Cardiomyopathy     CHF (congestive heart failure)     Chronic kidney disease     Chronic pain syndrome     SEES DR GODWIN NOLASCO FOR PAIN    Coronary artery disease     4 vessel CABG 7/19/18 by Dr. Tobias (WEISS-LAD, sequential SVG-PDA and PLV, SVG-D2)    Diabetes mellitus     TYPE 2. HAS INSULIN PUMP WITH CONT BASAL RATE    Disc degeneration, lumbar     Esophageal reflux     Fibromyalgia     H/O seasonal allergies     History of repair of mitral valve     26 mm Physio II ring annuloplasty and posteromedial commisuroplasty by Dr. Tobias on 7/19/18    History of sleep apnea     2003 SLEEP STUDY. HAD MACHINE THEN. USED 3YRS. SLEEP STUDY REPEATED. WAS OKED NOT TO USE MACHING ANY MORE    Hyperlipidemia     Hypertension     Hypothyroidism     Hypoxemia associated with sleep     Mitral regurgitation     Severe rheumatic mitral regurgitation prior to mitral valve repair.    Mitral valve prolapse     Myocardial infarction     CARLTON (obstructive sleep apnea) 10/04/2018    Home sleep study.  [facility].  Weight 207.  Mild CARLTON with AHI 5 events per hour.  Sleep-related hypoxia present for 8 minutes.    PCOS (polycystic ovarian syndrome)     Rheumatoid arthritis     SOB (shortness of breath)     Statin intolerance     Severe myalgias     Vitamin D deficiency    ,   Past Surgical History:   Procedure Laterality Date    ABLATION OF DYSRHYTHMIC FOCUS      CARDIAC CATHETERIZATION N/A 06/29/2018    Procedure: Right and Left Heart Cath and cors;  Surgeon: Stalin Handy MD;  Location: [facility] INVASIVE LOCATION;  Service:  Cardiovascular    CARDIAC CATHETERIZATION N/A 2018    Procedure: Coronary angiography;  Surgeon: Stalin Handy MD;  Location:  [facility] CATH INVASIVE LOCATION;  Service: Cardiovascular    CARDIAC CATHETERIZATION N/A 2018    Procedure: Left ventriculography;  Surgeon: Stalin Handy MD;  Location:  [facility] CATH INVASIVE LOCATION;  Service: Cardiovascular    CARDIAC CATHETERIZATION      CARDIAC VALVE REPLACEMENT       SECTION      X2    CORONARY ARTERY BYPASS GRAFT      CORONARY ARTERY BYPASS GRAFT WITH MITRAL VALVE REPAIR/REPLACEMENT N/A 2018    Procedure: LAURO STERNOTOMY CORONARY ARTERY BYPASS GRAFT TIMES 4 USING LEFT INTERNAL MAMMARY ARTERY AND RIGHT GREATER SAPHENOUS VEIN GRAFT PER ENDOSCOPIC VEIN HARVESTING, MITRAL VALVE REPAIR AND PRP;  Surgeon: Kevin Tobias MD;  Location:  [facility] MAIN OR;  Service: Cardiothoracic    ENDOMETRIAL ABLATION      X2    MITRAL VALVE REPAIR/REPLACEMENT      TONSILLECTOMY      UTERINE FIBROID SURGERY      Ablasion    VEIN SURGERY     ,   Family History   Problem Relation Age of Onset    Anemia Other     Arthritis Other     Asthma Other     Depression Other         with anxiety    Diabetes Other     Hypertension Other     Allergies Other     Heart disease Other     Diabetes Father     Hypertension Father     Stroke Paternal Grandmother     Heart disease Paternal Grandmother     Hypertension Paternal Grandmother     Heart attack Paternal Grandfather     Diabetes Paternal Grandfather     Heart disease Paternal Grandfather     Hypertension Paternal Grandfather     Hypertension Mother     Heart disease Maternal Grandmother     Hypertension Maternal Grandmother     Heart disease Maternal Grandfather     Hypertension Maternal Grandfather     Malig Hyperthermia Neg Hx    , and   Social History     Socioeconomic History    Marital status:    Tobacco Use    Smoking status: Former     Current packs/day: 1.00     Average packs/day: 1 pack/day for 69.2  "years (69.2 ttl pk-yrs)     Types: Cigarettes     Start date: 1985    Smokeless tobacco: Never    Tobacco comments:     Quit 6/2018   Vaping Use    Vaping status: Never Used   Substance and Sexual Activity    Alcohol use: Yes     Alcohol/week: 1.0 standard drink of alcohol     Types: 1 Drinks containing 0.5 oz of alcohol per week    Drug use: Never    Sexual activity: Yes     Partners: Male     Birth control/protection: Surgical, Post-menopausal     Comment: HIV testing     E-cigarette/Vaping    E-cigarette/Vaping Use Never User     Passive Exposure No      E-cigarette/Vaping Substances     E-cigarette/Vaping Devices      Social History: She is disabled.  4 caffeinated beverages a day.    Allergies:  Statins and Sulfa antibiotics     Medication Review: Her medication list reviewed.  No clonazepam.  She takes Seroquel 300 mg at bedtime.    Vital Signs:    Vitals:    03/06/24 1031   Pulse: 63   SpO2: 97%   Weight: 105 kg (231 lb)   Height: 162.6 cm (64\")      Body mass index is 39.65 kg/m².  Neck Circumference: 18.5 inches      Physical Exam:    Constitutional:  Well developed 58 y.o. female that appears in no apparent distress.  Awake & oriented times 3.  Normal mood with normal recent and remote memory and normal judgement.  Eyes:  Conjunctivae normal.  Oropharynx: Moist mucous membranes without exudate and a large tongue and class III Mallampati airway.    Neck: Trachea midline  Respiratory: Effort is not labored  Cardiovascular: Radial pulse regular  Musculoskeletal: Gait appears normal, no digital clubbing evident, no pre-tibial edema    Results Review: I reviewed her home sleep study dated 10/4/2018.    Downloads between 1/1 and 3/5/2024 demonstrates compliance greater than 90%.  Average usage 5 hours and 41 minutes.  Average AHI is normal without a significant leak.  Average auto CPAP pressures 11.1 and her ResMed auto CPAP is on factory settings of 4-20 cm water pressure.    Impression:   Mild obstructive " sleep apnea with sleep-related hypoxia by [place] home sleep study 10/4/2018, weight 207 pounds, that is adequately treated with ResMed auto CPAP.  Prior to 1/1/2024, the patient was not using auto CPAP.  Since 1/1/2024, downloads demonstrate the patient to be at goal with good compliance and usage.  The patient does have persistent complaints of hypersomnolence.  Circadian rhythm sleep disorder, delayed sleep phase type    The patient does have significant cardiac comorbidities: Previous CABG and mitral valve repair.  Diabetes mellitus and rheumatoid arthritis.    Plan:  Good sleep hygiene measures should be maintained.  Weight loss would be beneficial in this patient who has class II severe obesity.  By Body mass index is 39.65 kg/m².    At the time of her home sleep study, she weighed 207 pounds and presently she weighs 231 pounds.    After evaluating the patient and assessing results available, the patient is benefiting from the treatment being provided.     Pathophysiology of CARLTON described to the patient.  Cardiovascular complications of untreated CARLTON also reviewed.      I also reviewed auto CPAP pressure settings.  I explained factory settings of her auto CPAP.  With weight gain, the patient most likely is starting to low on her auto CPAP pressures.  Therefore, I would recommend changing auto CPAP to 7-13 cm water pressure.    Additionally, she is having trouble with her mask fit and the [facility] staff did review her mask and fit.    I also reviewed circadian rhythm sleep disorder, delayed sleep phase type.  I have asked the patient to take melatonin 10 or 20 mg 3-4 hours prior to her desired sleep time of approximately 10 PM.  I also encouraged her getting up at the same time every day.    I answered all of the patient's questions.  I will see the patient back in 6 or 8 weeks to review downloads to assure compliance and efficacy.  A new prescription will be sent to her DME for all needed  supplies.    Thank you for requesting me to assist in this patient's care.    Jason Burden MD  Sleep Medicine  03/10/24  09:02 EDT

## 2024-03-10 PROBLEM — G47.21 CIRCADIAN RHYTHM SLEEP DISORDER, DELAYED SLEEP PHASE TYPE: Status: ACTIVE | Noted: 2024-03-10

## 2024-03-10 PROBLEM — E66.812 CLASS 2 SEVERE OBESITY DUE TO EXCESS CALORIES WITH SERIOUS COMORBIDITY AND BODY MASS INDEX (BMI) OF 39.0 TO 39.9 IN ADULT: Status: ACTIVE | Noted: 2024-03-10

## 2024-03-10 PROBLEM — E66.01 CLASS 2 SEVERE OBESITY DUE TO EXCESS CALORIES WITH SERIOUS COMORBIDITY AND BODY MASS INDEX (BMI) OF 39.0 TO 39.9 IN ADULT: Status: ACTIVE | Noted: 2024-03-10

## 2024-03-10 PROBLEM — G47.33 OSA (OBSTRUCTIVE SLEEP APNEA): Status: RESOLVED | Noted: 2019-02-22 | Resolved: 2024-03-10

## 2024-03-10 PROBLEM — G47.14 HYPERSOMNIA DUE TO MEDICAL CONDITION: Status: ACTIVE | Noted: 2024-03-10

## 2024-03-10 PROBLEM — G47.33 OSA (OBSTRUCTIVE SLEEP APNEA): Status: ACTIVE | Noted: 2018-10-04

## 2024-05-30 ENCOUNTER — OFFICE VISIT (OUTPATIENT)
Dept: CARDIOLOGY | Facility: CLINIC | Age: 59
End: 2024-05-30
Payer: MEDICARE

## 2024-05-30 VITALS
BODY MASS INDEX: 36.4 KG/M2 | WEIGHT: 213.2 LBS | DIASTOLIC BLOOD PRESSURE: 86 MMHG | HEART RATE: 82 BPM | HEIGHT: 64 IN | SYSTOLIC BLOOD PRESSURE: 122 MMHG | OXYGEN SATURATION: 97 %

## 2024-05-30 DIAGNOSIS — I25.10 CORONARY ARTERY DISEASE INVOLVING NATIVE HEART WITHOUT ANGINA PECTORIS, UNSPECIFIED VESSEL OR LESION TYPE: ICD-10-CM

## 2024-05-30 DIAGNOSIS — I34.0 SEVERE MITRAL REGURGITATION: Primary | ICD-10-CM

## 2024-05-30 PROCEDURE — 3079F DIAST BP 80-89 MM HG: CPT | Performed by: NURSE PRACTITIONER

## 2024-05-30 PROCEDURE — 93000 ELECTROCARDIOGRAM COMPLETE: CPT | Performed by: NURSE PRACTITIONER

## 2024-05-30 PROCEDURE — 3074F SYST BP LT 130 MM HG: CPT | Performed by: NURSE PRACTITIONER

## 2024-05-30 PROCEDURE — 1160F RVW MEDS BY RX/DR IN RCRD: CPT | Performed by: NURSE PRACTITIONER

## 2024-05-30 PROCEDURE — 1159F MED LIST DOCD IN RCRD: CPT | Performed by: NURSE PRACTITIONER

## 2024-05-30 PROCEDURE — 99214 OFFICE O/P EST MOD 30 MIN: CPT | Performed by: NURSE PRACTITIONER

## 2024-05-30 RX ORDER — FUROSEMIDE 40 MG/1
40 TABLET ORAL DAILY
COMMUNITY
End: 2024-05-30

## 2024-05-30 NOTE — PROGRESS NOTES
[facility] Follow Up Office Note     Encounter Date:24  Patient:Angeles Oquendo  :1965  MRN:[id number]      Chief Complaint:   Chief Complaint   Patient presents with    Coronary Artery Disease     Patient is in the office today for her 6 month follow up appointment.          History of Presenting Illness:        Angeles Oquendo is a 59 y.o. female who is here for follow-up.  She is a patient of Dr Jimenez.    Patient has past medical history significant for coronary artery disease, cardiomyopathy, mitral valve regurgitation, hyperlipidemia, bipolar disorder, RA, statin intolerance.    Patient was seen about a year ago at which time she was stable from a cardiac perspective.    Today patient reports she is not having chest pain, increased dyspnea on exertion, lower extreme edema, PND orthopnea.  Her activity is limited significantly by RA and orthopedic issues.  Provide stress labs show increasing creatinine and she will see nephrology next week.    Review of Systems:  Review of Systems   Constitutional: Positive for malaise/fatigue.   Cardiovascular:  Negative for chest pain, dyspnea on exertion, leg swelling, orthopnea and palpitations.   Respiratory:  Negative for shortness of breath.    Musculoskeletal:  Positive for joint pain.       Current Outpatient Medications on File Prior to Visit   Medication Sig Dispense Refill    ascorbic acid (VITAMIN C) 1000 MG tablet Take 1 tablet by mouth.      aspirin 81 MG EC tablet Take 1 tablet by mouth Daily. 100 tablet 99    buPROPion XL (WELLBUTRIN XL) 150 MG 24 hr tablet Take 3 tablets by mouth Daily.      Calcium-Magnesium-Zinc 333-133-8.3 MG tablet Take 1 tablet by mouth 3 (Three) Times a Day.      carvedilol (COREG) 12.5 MG tablet Take 1 tablet by mouth 2 (Two) Times a Day. 180 tablet 3    Dulera 100-5 MCG/ACT inhaler       empagliflozin (JARDIANCE) 10 MG tablet tablet Take 1 tablet by mouth Daily.      Ergocalciferol (VITAMIN D2 PO) Take 1 capsule  by mouth 1 (One) Time Per Week. Takes q sunday      Evolocumab (Repatha) solution prefilled syringe injection Inject  under the skin into the appropriate area as directed.      fexofenadine (ALLEGRA) 180 MG tablet Take 1 tablet by mouth Daily.      Glucagon (Gvoke PFS) 1 MG/0.2ML solution prefilled syringe INJECT 1 EACH UNDER THE SKIN IF NEEDED(FAMILY TO USE ON PATIENT FOR SEVERE HYPROGLYCEMAI).      hydrALAZINE (APRESOLINE) 25 MG tablet Take 1 tablet by mouth Every 8 (Eight) Hours. 270 tablet 0    insulin lispro (HumaLOG) 100 UNIT/ML injection Inject  under the skin. 2 UNITS ON 4 CARBS BASAL RATE OF 3 PER HOUR. VIA INSULIN PUMP      Kevzara 200 MG/1.14ML solution auto-injector       levalbuterol (XOPENEX HFA) 45 MCG/ACT inhaler Inhale 1-2 puffs As Needed. every 4 to 6 hours      levalbuterol (XOPENEX) 0.63 MG/3ML nebulizer solution USE 3 ML VIA NEBULIZER EVERY 8 HOURS AS NEEDED      levothyroxine (SYNTHROID, LEVOTHROID) 175 MCG tablet Take 1 tablet by mouth Daily.      losartan (COZAAR) 50 MG tablet Take 1 tablet by mouth Daily. 90 tablet 3    Omega-3 Fatty Acids (Fish Oil) 435 MG capsule Take  by mouth 4 (Four) Times a Day.      omeprazole (priLOSEC) 40 MG capsule Take 1 capsule by mouth Daily As Needed.      QUEtiapine (SEROquel) 300 MG tablet Take 200 mg by mouth Every Night.      topiramate (TOPAMAX) 100 MG tablet Take 3 tablets by mouth Every Night.      traMADol (ULTRAM) 50 MG tablet 1 tablet Every 6 (Six) Hours As Needed.      vitamin E 1000 UNIT capsule Take 1 capsule by mouth.      Vortioxetine HBr 5 MG tablet Take 2 tablets by mouth Daily With Breakfast.      [DISCONTINUED] furosemide (LASIX) 40 MG tablet Take 1 tablet by mouth Daily.      Torsemide 40 MG tablet Take 40 mg by mouth Daily for 30 days. (Patient taking differently: Take 40 mg by mouth Daily. Take 40mg in the morning and 20mg in the afternoon) 30 tablet 0     No current facility-administered medications on file prior to visit.       Allergies    Allergen Reactions    Statins Myalgia    Sulfa Antibiotics Rash       Past Medical History:   Diagnosis Date    Abnormal ECG     Anesthesia complication     WITH LAURO TOOK A  LOT TO GET HER TO SLEEP    Asthma     Bipolar I disorder, single manic episode     Bursitis     LEFT ELBOW    Cardiomyopathy     CHF (congestive heart failure)     Chronic kidney disease     Chronic pain syndrome     SEES DR GODWIN NOLASCO FOR PAIN    Coronary artery disease     4 vessel CABG 7/19/18 by Dr. Tobias (WEISS-LAD, sequential SVG-PDA and PLV, SVG-D2)    Diabetes mellitus     TYPE 2. HAS INSULIN PUMP WITH CONT BASAL RATE    Disc degeneration, lumbar     Esophageal reflux     Fibromyalgia     H/O seasonal allergies     History of repair of mitral valve     26 mm Physio II ring annuloplasty and posteromedial commisuroplasty by Dr. Tobias on 7/19/18    History of sleep apnea     2003 SLEEP STUDY. HAD MACHINE THEN. USED 3YRS. SLEEP STUDY REPEATED. WAS OKED NOT TO USE MACHING ANY MORE    Hyperlipidemia     Hypertension     Hypothyroidism     Hypoxemia associated with sleep     Mitral regurgitation     Severe rheumatic mitral regurgitation prior to mitral valve repair.    Mitral valve prolapse     Myocardial infarction     CARLTON (obstructive sleep apnea) 10/04/2018    Home sleep study.  [place]amilet.  Weight 207.  Mild CARLTON with AHI 5 events per hour.  Sleep-related hypoxia present for 8 minutes.    PCOS (polycystic ovarian syndrome)     Rheumatoid arthritis     SOB (shortness of breath)     Statin intolerance     Severe myalgias     Vitamin D deficiency        Past Surgical History:   Procedure Laterality Date    ABLATION OF DYSRHYTHMIC FOCUS      CARDIAC CATHETERIZATION N/A 06/29/2018    Procedure: Right and Left Heart Cath and cors;  Surgeon: Stalin Handy MD;  Location: [facility] INVASIVE LOCATION;  Service: Cardiovascular    CARDIAC CATHETERIZATION N/A 06/29/2018    Procedure: Coronary angiography;  Surgeon: Stalin Handy MD;   Location[facility] CATH INVASIVE LOC[facility];  Service: Cardiovascular    CARDIAC CATHETERIZATION N/A 2018    Procedure: Left ventriculography;  Surgeon: Stalin Handy MD;  Location: [facility] CATH INVASIVE LOCATION;  Service: Cardiovascular    CARDIAC CATHETERIZATION      CARDIAC VALVE REPLACEMENT       SECTION      X2    CORONARY ARTERY BYPASS GRAFT      CORONARY ARTERY BYPASS GRAFT WITH MITRAL VALVE REPAIR/REPLACEMENT N/A 2018    Procedure: LAURO STERNOTOMY CORONARY ARTERY BYPASS GRAFT TIMES 4 USING LEFT INTERNAL MAMMARY ARTERY AND RIGHT GREATER SAPHENOUS VEIN GRAFT PER ENDOSCOPIC VEIN HARVESTING, MITRAL VALVE REPAIR AND PRP;  Surgeon: Kevin Tobias MD;  Location: [facility] MAIN OR;  Service: Cardiothoracic    ENDOMETRIAL ABLATION      X2    MITRAL VALVE REPAIR/REPLACEMENT      TONSILLECTOMY      UTERINE FIBROID SURGERY      Ablasion    VEIN SURGERY         Social History     Socioeconomic History    Marital status:    Tobacco Use    Smoking status: Former     Current packs/day: 1.00     Average packs/day: 1 pack/day for 69.4 years (69.4 ttl pk-yrs)     Types: Cigarettes     Start date:      Passive exposure: Past    Smokeless tobacco: Never    Tobacco comments:     Quit 2018   Vaping Use    Vaping status: Never Used   Substance and Sexual Activity    Alcohol use: Yes     Alcohol/week: 1.0 standard drink of alcohol     Types: 1 Drinks containing 0.5 oz of alcohol per week    Drug use: Never    Sexual activity: Yes     Partners: Male     Birth control/protection: Surgical, Post-menopausal     Comment: HIV testing       Family History   Problem Relation Age of Onset    Anemia Other     Arthritis Other     Asthma Other     Depression Other         with anxiety    Diabetes Other     Hypertension Other     Allergies Other     Heart disease Other     Diabetes Father     Hypertension Father     Stroke Paternal Grandmother     Heart disease Paternal Grandmother     Hypertension Paternal  "Grandmother     Heart attack Paternal Grandfather     Diabetes Paternal Grandfather     Heart disease Paternal Grandfather     Hypertension Paternal Grandfather     Hypertension Mother     Heart disease Maternal Grandmother     Hypertension Maternal Grandmother     Heart disease Maternal Grandfather     Hypertension Maternal Grandfather     Malig Hyperthermia Neg Hx        The following portions of the patient's history were reviewed and updated as appropriate: allergies, current medications, past family history, past medical history, past social history, past surgical history and problem list.       Objective:       Vitals:    05/30/24 1233   BP: 122/86   BP Location: Left arm   Patient Position: Sitting   Pulse: 82   SpO2: 97%   Weight: 96.7 kg (213 lb 3.2 oz)   Height: 162.6 cm (64\")         Physical Exam:  Constitutional: Well appearing, well developed, no acute distress   HENT: Oropharynx clear and membrane moist  Eyes: Normal conjunctiva, no sclera icterus  Neck: Supple, no carotid bruit bilaterally  Cardiovascular: Regular rate and rhythm, No Murmur, No bilateral lower extremity edema  Pulmonary: Normal respiratory effort, normal lung sounds, no wheezing  Neurological: Alert and orient x 3  Skin: Warm, dry, no ecchymosis, no rash  Psych: Appropriate mood and affect. Normal judgment and insight         Lab Results   Component Value Date     07/29/2022     07/28/2022    K 3.7 07/29/2022    K 4.1 07/28/2022     07/29/2022     07/28/2022    CO2 30.0 (H) 07/29/2022    CO2 32.3 (H) 07/28/2022    BUN 28 (H) 07/29/2022    BUN 23 (H) 07/28/2022    CREATININE 1.36 (H) 07/29/2022    CREATININE 1.28 (H) 07/28/2022    EGFRIFNONA 72 07/24/2018    EGFRIFNONA 65 07/23/2018    EGFRIFAFRI 80 06/01/2015    EGFRIFAFRI >60 01/23/2015    GLUCOSE 335 (H) 07/29/2022    GLUCOSE 368 (H) 07/28/2022    CALCIUM 8.9 07/29/2022    CALCIUM 8.5 (L) 07/28/2022    PROTENTOTREF 6.9 06/01/2015    PROTENTOTREF 7.2 " 01/23/2015    ALBUMIN 3.80 07/29/2022    ALBUMIN 4.00 07/26/2022    BILITOT 0.6 07/26/2022    BILITOT 0.3 07/18/2018    AST 30 07/26/2022    AST 14 07/18/2018    ALT 26 07/26/2022    ALT 15 07/18/2018     Lab Results   Component Value Date    WBC 7.30 07/28/2022    WBC 10.50 07/27/2022    HGB 14.3 07/28/2022    HGB 15.0 07/27/2022    HCT 43.8 07/28/2022    HCT 44.9 07/27/2022    MCV 97.6 (H) 07/28/2022    MCV 96.6 07/27/2022     (L) 07/28/2022     (L) 07/27/2022     Lab Results   Component Value Date    CHOL 170 07/18/2018    TRIG 125 07/18/2018    TRIG 88 06/01/2015    HDL 53 03/06/2024    HDL 46 07/18/2018    LDL 42.0 03/06/2024    LDL 99 07/18/2018     Lab Results   Component Value Date    PROBNP 1,548.0 (H) 07/26/2022    PROBNP 524.3 07/18/2018     Lab Results   Component Value Date    TROPONINT <0.010 07/26/2022     Lab Results   Component Value Date    TSH 1.80 03/06/2024    TSH 0.566 07/28/2022          Cardiac Procedures:  Left heart catheterization on 6/29/2018: The left main was normal.  The LAD had 75-80% mid disease.  There was a large diagonal with 20% proximal disease and 30-40% distal disease.  The left circumflex had 50% mid disease.  The RCA was occluded with recanalization and collaterals from the left.  Status post four-vessel CABG (LIMA-LAD, SVG-D2, and sequential SVG-PDA and PLV) and complex mitral valve repair by Dr. Tobias on 7/19/2018.  The mitral valve repair consists of a 26 mm Physio II ring annuloplasty and posteromedial commissuroplasty.  Echocardiogram on 7/23/2018: There was moderate hypokinesis of the basal to mid inferior wall and severe hypokinesis of the basal to mid inferoseptum.  The ejection fraction was 45%.  The left atrium was moderately dilated.  There was mild aortic insufficiency.  The mitral valve repair appeared to be intact with elevated gradients (peak gradient 17 mmHg and mean gradient 8 mmHg).  There was trace mitral regurgitation.  There was mild  tricuspid regurgitation.  The capillary RVSP was 40 mmHg.  Echocardiogram 9/29/2020.  LVEF 43%.  LV cavity is mildly dilated.  Mild LVH.  Normal RV cavity size and systolic function.  LAE.  Mild aortic valve regurgitation.  Mitral valve ring present and gradients across the mitral valve repair are normal.  No evidence of pericardial effusion.  24-hour monitor 10/7/2020.  PVCs occurred frequently.  Echocardiogram 7/27/2022.  LVEF 41-45%.  Moderate hypertrophy.  RV cavity is mildly dilated.  Annuloplasty ring in mitral valve position gradients across the readings are severely elevated with peak 36 mmHg and mean 14 mmHg.  Mitral valve regurgitation is difficult to quantify from the study.  LAURO 7/28/2022.  LVEF 41-45%.  RV cavity is mildly dilated with normal RV systolic function.  Small to moderate PFO on agitated study.  Mitral valve is status postrepair with annuloplasty ring.  Peak gradient 18 mmHg, mean gradient 8.8 mmHg.  There is an eccentric anterior jet of mitral valve regurgitation.  When patient was sedated mitral valve was moderate when patient systolic blood pressure was in the 120s mitral valve regurgitation was severe.      ECG 12 Lead    Date/Time: 5/30/2024 1:18 PM  Performed by: Imani Carrington APRN    Authorized by: Imani Carrington APRN  Comparison: compared with previous ECG from 9/19/2023  Similar to previous ECG  Rhythm: sinus rhythm  Rate: normal  BPM: 78  Q waves: II, III and aVF               Assessment:          Diagnosis Plan   1. Severe mitral regurgitation        2. Coronary artery disease involving native heart without angina pectoris, unspecified vessel or lesion type  ECG 12 Lead             Plan:       Coronary disease -history of four-vessel CABG.  She is not having anginal symptoms.  Continue GDMT with aspirin, carvedilol, Repatha.  She is statin intolerant  Ischemic cardiomyopathy -appears compensated on exam.  Continue beta-blocker, losartan.  Limited by CKD  Primary  hypertension -controlled  History of mitral valve repair - moderate stenosis on last echo 2022. Repeat ordered, can be done at follow-up. Symptoms stable    Patient is seen today for follow-up.  She is stable from a cardiac perspective.  I am recommending repeat echocardiogram at next appointment to follow mitral valve    Will schedule follow-up sooner than normal so she can meet Dr. Jimenez who will be her new cardiologist    Orders Placed This Encounter   Procedures    ECG 12 Lead     This order was created via procedure documentation     Order Specific Question:   Release to patient     Answer:   Routine Release [[id number]]            KALYANI Caputo  [facility]  05/30/24  13:36 EDT

## 2024-06-18 ENCOUNTER — TELEPHONE (OUTPATIENT)
Dept: CARDIOLOGY | Facility: CLINIC | Age: 59
End: 2024-06-18
Payer: MEDICARE

## 2024-08-20 RX ORDER — CARVEDILOL 12.5 MG/1
12.5 TABLET ORAL 2 TIMES DAILY
Qty: 180 TABLET | Refills: 0 | Status: SHIPPED | OUTPATIENT
Start: 2024-08-20 | End: 2024-08-21 | Stop reason: SDUPTHER

## 2024-08-21 RX ORDER — CARVEDILOL 12.5 MG/1
12.5 TABLET ORAL 2 TIMES DAILY
Qty: 180 TABLET | Refills: 0 | Status: SHIPPED | OUTPATIENT
Start: 2024-08-21

## 2024-11-05 ENCOUNTER — TRANSCRIBE ORDERS (OUTPATIENT)
Dept: ADMINISTRATIVE | Facility: HOSPITAL | Age: 59
End: 2024-11-05
Payer: MEDICARE

## 2024-11-05 DIAGNOSIS — I65.23 BILATERAL CAROTID ARTERY OCCLUSION: Primary | ICD-10-CM

## 2024-11-13 RX ORDER — CARVEDILOL 12.5 MG/1
12.5 TABLET ORAL 2 TIMES DAILY
Qty: 180 TABLET | Refills: 0 | Status: SHIPPED | OUTPATIENT
Start: 2024-11-13

## 2025-02-11 RX ORDER — CARVEDILOL 12.5 MG/1
12.5 TABLET ORAL 2 TIMES DAILY
Qty: 180 TABLET | Refills: 0 | Status: SHIPPED | OUTPATIENT
Start: 2025-02-11

## 2025-05-12 RX ORDER — CARVEDILOL 12.5 MG/1
12.5 TABLET ORAL 2 TIMES DAILY
Qty: 180 TABLET | Refills: 0 | Status: SHIPPED | OUTPATIENT
Start: 2025-05-12

## 2025-06-17 ENCOUNTER — OFFICE VISIT (OUTPATIENT)
Dept: CARDIOLOGY | Age: 60
End: 2025-06-17
Payer: MEDICARE

## 2025-06-17 VITALS
HEART RATE: 71 BPM | BODY MASS INDEX: 29.6 KG/M2 | HEIGHT: 64 IN | OXYGEN SATURATION: 98 % | SYSTOLIC BLOOD PRESSURE: 110 MMHG | DIASTOLIC BLOOD PRESSURE: 68 MMHG | WEIGHT: 173.4 LBS

## 2025-06-17 DIAGNOSIS — G47.33 OSA (OBSTRUCTIVE SLEEP APNEA): ICD-10-CM

## 2025-06-17 DIAGNOSIS — I10 PRIMARY HYPERTENSION: ICD-10-CM

## 2025-06-17 DIAGNOSIS — Z95.1 S/P CABG (CORONARY ARTERY BYPASS GRAFT): ICD-10-CM

## 2025-06-17 DIAGNOSIS — E78.41 ELEVATED LIPOPROTEIN(A): ICD-10-CM

## 2025-06-17 DIAGNOSIS — I34.0 NONRHEUMATIC MITRAL VALVE REGURGITATION: ICD-10-CM

## 2025-06-17 DIAGNOSIS — Z98.890 H/O MITRAL VALVE REPAIR: Primary | ICD-10-CM

## 2025-06-17 PROCEDURE — 1159F MED LIST DOCD IN RCRD: CPT | Performed by: STUDENT IN AN ORGANIZED HEALTH CARE EDUCATION/TRAINING PROGRAM

## 2025-06-17 PROCEDURE — 1160F RVW MEDS BY RX/DR IN RCRD: CPT | Performed by: STUDENT IN AN ORGANIZED HEALTH CARE EDUCATION/TRAINING PROGRAM

## 2025-06-17 PROCEDURE — 93000 ELECTROCARDIOGRAM COMPLETE: CPT | Performed by: STUDENT IN AN ORGANIZED HEALTH CARE EDUCATION/TRAINING PROGRAM

## 2025-06-17 PROCEDURE — 3078F DIAST BP <80 MM HG: CPT | Performed by: STUDENT IN AN ORGANIZED HEALTH CARE EDUCATION/TRAINING PROGRAM

## 2025-06-17 PROCEDURE — 3074F SYST BP LT 130 MM HG: CPT | Performed by: STUDENT IN AN ORGANIZED HEALTH CARE EDUCATION/TRAINING PROGRAM

## 2025-06-17 PROCEDURE — 99215 OFFICE O/P EST HI 40 MIN: CPT | Performed by: STUDENT IN AN ORGANIZED HEALTH CARE EDUCATION/TRAINING PROGRAM

## 2025-06-17 RX ORDER — CARVEDILOL 12.5 MG/1
12.5 TABLET ORAL 2 TIMES DAILY
Qty: 180 TABLET | Refills: 3 | Status: SHIPPED | OUTPATIENT
Start: 2025-06-17 | End: 2026-06-17

## 2025-06-17 NOTE — PROGRESS NOTES
[facility]    Subjective:     Encounter Date:06/17/25      Patient ID: Angeles Oquendo is a 60 y.o. female.    Chief Complaint:   Chief Complaint   Patient presents with    Severe mitral regurgitation     Patient is in the office today for her 1 year follow up appointment.       History of Present Illness    Angeles Oquendo is a 60 y.o. lady with a complex prior cardiac history presents for follow-up.    She previously followed Dr. James in the past.    She has a complicated prior medical history including insulin-dependent diabetes, now with substantial weight loss, CKD, CAD status post CABG and prior mitral valve repair.    She was hospitalized in 2022 for decompensated CHF and dyspnea on exertion in the setting of significant hypertension.  She was noted to have moderate to severe mitral regurgitation on that echocardiogram..    I directly viewed interpret the patient's transesophageal echocardiogram that was performed in July 20, 2022.  There is moderate mitral regurgitation.  I do not see any features to suggest severe MR.  There is a mean mitral gradient of 7 mmHg.  Aortic valve structure appears normal.    She was evaluated by Dr. Tobias in the office, who felt that ongoing medical management was recommended.    She presents today for follow-up.  She has intentionally lost a lot of weight recently, and she has not had her hypertensive slowly decreased, and had some orthostatic issues recently and saw her nephrologist.  He was wanting to wean down her other medications including Jardiance, and her carvedilol but she wanted to be reevaluated with cardiology before that was made.    She otherwise feels well.  She has no significant chest pain, no shortness of breath, and otherwise feels okay.        Previous Cardiac Testing:  Left heart catheterization on 6/29/2018: The left main was normal.  The LAD had 75-80% mid disease.  There was a large diagonal with 20% proximal disease and  30-40% distal disease.  The left circumflex had 50% mid disease.  The RCA was occluded with recanalization and collaterals from the left.  Status post four-vessel CABG (LIMA-LAD, SVG-D2, and sequential SVG-PDA and PLV) and complex mitral valve repair by Dr. Tobias on 7/19/2018.  The mitral valve repair consists of a 26 mm Physio II ring annuloplasty and posteromedial commissuroplasty.  Echocardiogram on 7/23/2018: There was moderate hypokinesis of the basal to mid inferior wall and severe hypokinesis of the basal to mid inferoseptum.  The ejection fraction was 45%.  The left atrium was moderately dilated.  There was mild aortic insufficiency.  The mitral valve repair appeared to be intact with elevated gradients (peak gradient 17 mmHg and mean gradient 8 mmHg).  There was trace mitral regurgitation.  There was mild tricuspid regurgitation.  The capillary RVSP was 40 mmHg.  Echocardiogram 9/29/2020.  LVEF 43%.  LV cavity is mildly dilated.  Mild LVH.  Normal RV cavity size and systolic function.  LAE.  Mild aortic valve regurgitation.  Mitral valve ring present and gradients across the mitral valve repair are normal.  No evidence of pericardial effusion.  24-hour monitor 10/7/2020.  PVCs occurred frequently.  Echocardiogram 7/27/2022.  LVEF 41-45%.  Moderate hypertrophy.  RV cavity is mildly dilated.  Annuloplasty ring in mitral valve position gradients across the readings are severely elevated with peak 36 mmHg and mean 14 mmHg.  Mitral valve regurgitation is difficult to quantify from the study.  LAURO 7/28/2022.  LVEF 41-45%.  RV cavity is mildly dilated with normal RV systolic function.  Small to moderate PFO on agitated study.  Mitral valve is status postrepair with annuloplasty ring.  Peak gradient 18 mmHg, mean gradient 8.8 mmHg.  There is an eccentric anterior jet of mitral valve regurgitation.  When patient was sedated mitral valve was moderate when patient systolic blood pressure was in the 120s mitral valve  regurgitation was severe.       The following portions of the patient's history were reviewed and updated as appropriate: allergies, current medications, past family history, past medical history, past social history, past surgical history and problem list.    Past Medical History:   Diagnosis Date    Abnormal ECG     Anesthesia complication     WITH LAURO TOOK A  LOT TO GET HER TO SLEEP    Asthma     Bipolar I disorder, single manic episode     Bursitis     LEFT ELBOW    Cardiomyopathy     CHF (congestive heart failure)     Chronic kidney disease     Chronic pain syndrome     SEES DR GODWIN NOLASCO FOR PAIN    Coronary artery disease     4 vessel CABG 7/19/18 by Dr. Tobias (WEISS-LAD, sequential SVG-PDA and PLV, SVG-D2)    Diabetes mellitus     TYPE 2. HAS INSULIN PUMP WITH CONT BASAL RATE    Disc degeneration, lumbar     Esophageal reflux     Fibromyalgia     H/O seasonal allergies     History of repair of mitral valve     26 mm Physio II ring annuloplasty and posteromedial commisuroplasty by Dr. Tobias on 7/19/18    History of sleep apnea     2003 SLEEP STUDY. HAD MACHINE THEN. USED 3YRS. SLEEP STUDY REPEATED. WAS OKED NOT TO USE MACHING ANY MORE    Hyperlipidemia     Hypertension     Hypothyroidism     Hypoxemia associated with sleep     Mitral regurgitation     Severe rheumatic mitral regurgitation prior to mitral valve repair.    Mitral valve prolapse     Myocardial infarction     CARLTON (obstructive sleep apnea) 10/04/2018    Home sleep study.  [place].  Weight 207.  Mild CARLTON with AHI 5 events per hour.  Sleep-related hypoxia present for 8 minutes.    PCOS (polycystic ovarian syndrome)     Rheumatoid arthritis     SOB (shortness of breath)     Statin intolerance     Severe myalgias     Vitamin D deficiency        Past Surgical History:   Procedure Laterality Date    ABLATION OF DYSRHYTHMIC FOCUS      CARDIAC CATHETERIZATION N/A 06/29/2018    Procedure: Right and Left Heart Cath and cors;  Surgeon: Stalin Handy,  "MD;  Location: [facility] CATH INVASIVE LOCATION;  Service: Cardiovascular    CARDIAC CATHETERIZATION N/A 2018    Procedure: Coronary angiography;  Surgeon: Stalin Handy MD;  Location: [facility] CATH INVASIVE LOCATION;  Service: Cardiovascular    CARDIAC CATHETERIZATION N/A 2018    Procedure: Left ventriculography;  Surgeon: Stalin Handy MD;  Location: [facility] CATH INVASIVE LOCATION;  Service: Cardiovascular    CARDIAC CATHETERIZATION      CARDIAC VALVE REPLACEMENT       SECTION      X2    CORONARY ARTERY BYPASS GRAFT      CORONARY ARTERY BYPASS GRAFT WITH MITRAL VALVE REPAIR/REPLACEMENT N/A 2018    Procedure: LAURO STERNOTOMY CORONARY ARTERY BYPASS GRAFT TIMES 4 USING LEFT INTERNAL MAMMARY ARTERY AND RIGHT GREATER SAPHENOUS VEIN GRAFT PER ENDOSCOPIC VEIN HARVESTING, MITRAL VALVE REPAIR AND PRP;  Surgeon: Kevin Tobias MD;  Location: [facility] OR;  Service: Cardiothoracic    ENDOMETRIAL ABLATION      X2    MITRAL VALVE REPAIR/REPLACEMENT      TONSILLECTOMY      UTERINE FIBROID SURGERY      Ablasion    VEIN SURGERY             ECG 12 Lead    Date/Time: 2025 1:34 PM  Performed by: Silver Jimenez MD    Authorized by: Silver Jimenez MD  Comparison: compared with previous ECG from 2024  Similar to previous ECG  Rhythm: sinus rhythm  Rate: normal  Conduction: left posterior fascicular block  ST Segments: ST segments normal  T Waves: T waves normal  QRS axis: right  Other findings: non-specific ST-T wave changes    Clinical impression: non-specific ECG             Objective:     Vitals:    25 1321   BP: 110/68   BP Location: Left arm   Patient Position: Sitting   Cuff Size: Adult   Pulse: 71   SpO2: 98%   Weight: 78.7 kg (173 lb 6.4 oz)   Height: 162.6 cm (64\")         Constitutional:       Appearance: Healthy appearance. Not in distress.   Neck:      Vascular: JVD normal.   Pulmonary:      Effort: Pulmonary effort is normal.      Breath sounds: Normal breath sounds. "   Cardiovascular:      PMI at left midclavicular line. Normal rate. Regular rhythm. Normal S2.       Murmurs: There is a grade 1/6 high frequency blowing holosystolic murmur at the apex.   Pulses:     Intact distal pulses.   Edema:     Peripheral edema absent.   Skin:     General: Skin is warm and dry.   Neurological:      General: No focal deficit present.      Mental Status: Alert, oriented to person, place, and time and oriented to person, place and time.   Psychiatric:         Mood and Affect: Mood and affect normal.         Lab Review:     Lipid Panel          9/3/2024    09:53 4/2/2025    14:03   Lipid Panel   Total Cholesterol 95     86       Triglycerides  59       HDL Cholesterol 36     50       LDL Cholesterol  34.4     22          Details          This result is from an external source.             BUN   Date Value Ref Range Status   07/29/2022 28 (H) 6 - 20 mg/dL Final     Creatinine   Date Value Ref Range Status   07/29/2022 1.36 (H) 0.57 - 1.00 mg/dL Final     Potassium   Date Value Ref Range Status   07/29/2022 3.7 3.5 - 5.2 mmol/L Final     ALT (SGPT)   Date Value Ref Range Status   07/26/2022 26 1 - 33 U/L Final     AST (SGOT)   Date Value Ref Range Status   07/26/2022 30 1 - 32 U/L Final         Performed        Assessment:          Diagnosis Plan   1. H/O mitral valve repair  ECG 12 Lead    Adult Transthoracic Echo Complete w/ Color, Spectral and Contrast if Necessary Per Protocol      2. Primary hypertension        3. Elevated lipoprotein(a)        4. Nonrheumatic mitral valve regurgitation        5. CARLTON (obstructive sleep apnea) treated with auto CPAP        6. S/P CABG (coronary artery bypass graft)               Plan:         Hypertension  Much better controlled on intentional weight loss.  She is no longer taking losartan, hydralazine, and is on carvedilol monotherapy.  CAD, status post CABG 2018,(LIMA-LAD, SVG-D2, and sequential SVG-PDA and PLV   Mitral regurgitation, previously severe,  status post complex mitral repair with 26 mm physio 2 ring: Subsequent moderate to severe mitral regurgitation noted on transesophageal echocardiogram 2022 with an elevated mean mitral gradient of 8 mm on LAURO  Was reevaluated by Dr. Tobias in 2022, medical therapy was recommended  Currently, she feels great.  Continue medical management.  Reassess echocardiogram to reassess gradients and MR severity  Given elevated mitral gradients she is probably going to require some sort of beta-blockade to maintain heart rate control.    Cardiomyopathy: Ischemic.  EF historically 40 to 45%.  Reassess echocardiogram to determine medical therapy needs  ?  Need for ARB, will see what the EF is  Would recommend continued use of SGLT2 inhibitor given CKD, diabetes as well as propensity for CHF  She is no longer requiring diuretics  CKD 3A/B: She follows with nephrology.Dr. Efraín Hdz  Hyperlipidemia statin intolerant.  She is on Repatha her LDL is in the 20s.  Continue prescribed by PCP  Diabetes mellitus.  Follows with outside endocrine.       RTC 1 year for reevaluation.  There are questions about feeling back her medical therapy given her precipitous weight loss recently has improved her blood pressure.  Perhaps if she has orthostatic symptoms carvedilol can be swapped for metoprolol but I would not recommend any changes right now.  Will see what the echo shows and that will help us determine the true needs for her GDMT to maintain her cardiomyopathy.    I spent 42 minutes today with direct face-to-face evaluation of the patient, and comprehensive extensive chart review and interpretation of her previous transesophageal echocardiogram, and discussion about ongoing care plan given her known mitral valve disease.    Silver Jimenez MD  [facility]  06/17/25  13:33 EDT       Current Outpatient Medications:     ascorbic acid (VITAMIN C) 1000 MG tablet, Take 1 tablet by mouth., Disp: , Rfl:     aspirin 81 MG EC tablet, Take  1 tablet by mouth Daily., Disp: 100 tablet, Rfl: 99    Calcium-Magnesium-Zinc 333-133-8.3 MG tablet, Take 1 tablet by mouth 3 (Three) Times a Day., Disp: , Rfl:     carvedilol (COREG) 12.5 MG tablet, Take 1 tablet by mouth 2 (Two) Times a Day., Disp: 180 tablet, Rfl: 3    Dulera 100-5 MCG/ACT inhaler, , Disp: , Rfl:     Ergocalciferol (VITAMIN D2 PO), Take 1 capsule by mouth 1 (One) Time Per Week. Takes q sunday, Disp: , Rfl:     Evolocumab (Repatha) solution prefilled syringe injection, Inject  under the skin into the appropriate area as directed., Disp: , Rfl:     fexofenadine (ALLEGRA) 180 MG tablet, Take 1 tablet by mouth Daily., Disp: , Rfl:     Glucagon (Gvoke PFS) 1 MG/0.2ML solution prefilled syringe, INJECT 1 EACH UNDER THE SKIN IF NEEDED(FAMILY TO USE ON PATIENT FOR SEVERE HYPROGLYCEMAI)., Disp: , Rfl:     insulin lispro (HumaLOG) 100 UNIT/ML injection, Inject  under the skin. 2 UNITS ON 4 CARBS BASAL RATE OF 3 PER HOUR. VIA INSULIN PUMP, Disp: , Rfl:     levalbuterol (XOPENEX HFA) 45 MCG/ACT inhaler, Inhale 1-2 puffs As Needed. every 4 to 6 hours, Disp: , Rfl:     levalbuterol (XOPENEX) 0.63 MG/3ML nebulizer solution, USE 3 ML VIA NEBULIZER EVERY 8 HOURS AS NEEDED, Disp: , Rfl:     levothyroxine (SYNTHROID, LEVOTHROID) 175 MCG tablet, Take 1 tablet by mouth Daily., Disp: , Rfl:     Omega-3 Fatty Acids (Fish Oil) 435 MG capsule, Take  by mouth 4 (Four) Times a Day., Disp: , Rfl:     omeprazole (priLOSEC) 40 MG capsule, Take 1 capsule by mouth Daily As Needed., Disp: , Rfl:     QUEtiapine (SEROquel) 300 MG tablet, Take 200 mg by mouth Every Night., Disp: , Rfl:     topiramate (TOPAMAX) 100 MG tablet, Take 3 tablets by mouth Every Night., Disp: , Rfl:     traMADol (ULTRAM) 50 MG tablet, 1 tablet Every 6 (Six) Hours As Needed., Disp: , Rfl:     vitamin E 1000 UNIT capsule, Take 1 capsule by mouth., Disp: , Rfl:     Vortioxetine HBr 5 MG tablet, Take 2 tablets by mouth Daily With Breakfast., Disp: , Rfl:      Kevzara 200 MG/1.14ML solution auto-injector, , Disp: , Rfl:          Return in about 1 year (around 6/17/2026).      Part of this note may be an electronic transcription/translation of spoken language to printed text using the Dragon Dictation System.

## 2025-06-26 ENCOUNTER — TELEPHONE (OUTPATIENT)
Dept: CARDIOLOGY | Age: 60
End: 2025-06-26
Payer: MEDICARE

## 2025-08-01 ENCOUNTER — TELEPHONE (OUTPATIENT)
Dept: CARDIOLOGY | Age: 60
End: 2025-08-01
Payer: MEDICARE

## 2025-08-11 RX ORDER — CARVEDILOL 12.5 MG/1
12.5 TABLET ORAL 2 TIMES DAILY
Qty: 180 TABLET | Refills: 3 | Status: SHIPPED | OUTPATIENT
Start: 2025-08-11

## (undated) DEVICE — PK HEART OPN 40

## (undated) DEVICE — GLV SURG BIOGEL LTX PF 7 1/2

## (undated) DEVICE — 28 FR RIGHT ANGLE – SOFT PVC CATHETER: Brand: PVC THORACIC CATHETERS

## (undated) DEVICE — CORONARY ARTERY BYPASS GRAFT MARKERS, STAINLESS STEEL, DISTAL, WITHOUT HOLDER: Brand: ANASTOMARK CORONARY ARTERY BYPASS GRAFT MARKERS, STAINLESS STEEL, DISTAL

## (undated) DEVICE — SINGLE STAGE VENOUS RETURN CANNULA: Brand: THIN-FLEX SINGLE STAGE VENOUS DRAINAGE CANNULA

## (undated) DEVICE — CATH DIAG IMPULSE FL3.5 5F 100CM

## (undated) DEVICE — INTENDED FOR TISSUE SEPARATION, AND OTHER PROCEDURES THAT REQUIRE A SHARP SURGICAL BLADE TO PUNCTURE OR CUT.: Brand: BARD-PARKER ® CARBON RIB-BACK BLADES

## (undated) DEVICE — GW EMR FIX EXCHG J STD .035 3MM 260CM

## (undated) DEVICE — Device

## (undated) DEVICE — GLV SURG BIOGEL LTX PF 8 1/2

## (undated) DEVICE — CLAMP INSERT: Brand: STEALTH® CLAMP INSERT

## (undated) DEVICE — CANN RETRGR STYLET RSCP 15F

## (undated) DEVICE — ADAPT ANTEGRADE RETRGR

## (undated) DEVICE — PK ATS CUST W CARDIOTOMY RESEVOIR

## (undated) DEVICE — IRRIGATOR BULB ASEPTO 60CC STRL

## (undated) DEVICE — HEMOCONCENTRATOR PERFUS LPS06

## (undated) DEVICE — CANN VENI DLP SGL/STAGE RT/ANGL MTL/TP 28F

## (undated) DEVICE — DRP SLUSH MACH FOR STND ALONE OM-ORS-321

## (undated) DEVICE — SPNG GZ WOVN 4X4IN 12PLY 10/BX STRL

## (undated) DEVICE — SOL NACL 0.9PCT 1000ML

## (undated) DEVICE — CONN REDUCING CANN/PUMP 1/2X3/8X3/8

## (undated) DEVICE — ST. SORBAVIEW ULTIMATE IJ SYSTEM A,C: Brand: CENTURION

## (undated) DEVICE — BLOWER/MISTER AXIOUS OPCAB W/TBG

## (undated) DEVICE — GLIDESHEATH BASIC HYDROPHILIC COATED INTRODUCER SHEATH: Brand: GLIDESHEATH

## (undated) DEVICE — GOWN,NON-REINFORCED,SIRUS,SET IN SLV,XXL: Brand: MEDLINE

## (undated) DEVICE — OASIS DRAIN, SINGLE, INLINE & ATS COMPATIBLE: Brand: OASIS

## (undated) DEVICE — CATH DIAG IMPULSE PIG 5F 100CM

## (undated) DEVICE — DRSNG BRDR MEPILEX P/OP SIL 4X12IN

## (undated) DEVICE — 3M™ MEDIPORE™ H SOFT CLOTH SURGICAL TAPE 2862, 2 INCH X 10 YARD (5CM X 9,1M), 12 ROLLS/CASE: Brand: 3M™ MEDIPORE™

## (undated) DEVICE — CATH DIAG IMPULSE FR4 5F 100CM

## (undated) DEVICE — SOL ISO/ALC RUB 70PCT 4OZ

## (undated) DEVICE — GOWN,SLEEVE,STERILE,W/CSR WRAP,1/P: Brand: MEDLINE

## (undated) DEVICE — SOL IRR H2O BTL 1000ML STRL

## (undated) DEVICE — INSUFFLATION TUBING,LAPAROSCOPIC: Brand: DEROYAL

## (undated) DEVICE — TEMP PACING WIRE: Brand: MYO/WIRE

## (undated) DEVICE — CANN ART EOPA 3D NV W/CONN 20F

## (undated) DEVICE — SENSR CERBRL O2 PK/2

## (undated) DEVICE — 28 FR STRAIGHT – SOFT PVC CATHETER: Brand: PVC THORACIC CATHETERS

## (undated) DEVICE — KT MANIFLD CARDIAC

## (undated) DEVICE — ST TOURNI COMPL A/ 7IN

## (undated) DEVICE — BIOPATCH™ ANTIMICROBIAL DRESSING WITH CHLORHEXIDINE GLUCONATE IS A HYDROPHILLIC POLYURETHANE ABSORPTIVE FOAM WITH CHLORHEXIDINE GLUCONATE (CHG) WHICH INHIBITS BACTERIAL GROWTH UNDER THE DRESSING. THE DRESSING IS INTENDED TO BE USED TO ABSORB EXUDATE, COVER A WOUND CAUSED BY VASCULAR AND NONVASCULAR PERCUTANEOUS MEDICAL DEVICES DURING SURGERY, AS WELL AS REDUCE LOCAL INFECTION AND COLONIZATION OF MICROORGANISMS.: Brand: BIOPATCH

## (undated) DEVICE — BNDG ELAS ELITE V/CLOSE 4IN 5YD LF STRL

## (undated) DEVICE — TBG ART PRESS 60 IN

## (undated) DEVICE — BNDG ELAS ELITE V/CLOSE 6IN 5YD LF STRL

## (undated) DEVICE — ROTATING SURGICAL PUNCHES, 1 PER POUCH: Brand: A&E MEDICAL / ROTATING SURGICAL PUNCHES

## (undated) DEVICE — SOL IRR NACL 0.9PCT BT 1000ML

## (undated) DEVICE — 8 FOOT DISPOSABLE EXTENSION CABLE WITH SAFE CONNECT / ALLIGATOR CLIP

## (undated) DEVICE — BALN PRESS WEDGE 5F 110CM

## (undated) DEVICE — DRSNG WND BORDR/ADHS NONADHR/GZ LF 4X14IN STRL

## (undated) DEVICE — SYS PERFUS SEP PLATLT W TIPS CUST

## (undated) DEVICE — SPNG DISECTOR KTNER XRAY COTN 1/4X9/16IN PK/5

## (undated) DEVICE — PK PERFUS CUST W/CARDIOPLEGIA

## (undated) DEVICE — Y-TYPE BLOOD/SOLUTION SET WITH STANDARD BLOOD FILTER, 170 TO 260 MICRON FILTER, LUER ACTIVATED VALVE, MALE LUER LOCK ADAPTER WITH RETRACTABLE COLLAR: Brand: CLEARLINK

## (undated) DEVICE — VASOVIEW HEMOPRO: Brand: VASOVIEW HEMOPRO

## (undated) DEVICE — CANN AORT ROOT DLP VNT 14G 7F

## (undated) DEVICE — ORGANIZER SUT SHELIGH 3T 213013

## (undated) DEVICE — PK CATH CARD 40

## (undated) DEVICE — TOWEL,OR,DSP,ST,BLUE,STD,4/PK,20PK/CS: Brand: MEDLINE